# Patient Record
Sex: FEMALE | Race: WHITE | NOT HISPANIC OR LATINO | Employment: OTHER | URBAN - METROPOLITAN AREA
[De-identification: names, ages, dates, MRNs, and addresses within clinical notes are randomized per-mention and may not be internally consistent; named-entity substitution may affect disease eponyms.]

---

## 2017-01-12 ENCOUNTER — HOSPITAL ENCOUNTER (OUTPATIENT)
Dept: MRI IMAGING | Facility: HOSPITAL | Age: 67
Discharge: HOME | End: 2017-01-12
Attending: SURGERY
Payer: MEDICARE

## 2017-01-12 DIAGNOSIS — K86.3 CYST AND PSEUDOCYST OF PANCREAS: ICD-10-CM

## 2017-01-12 DIAGNOSIS — K86.2 CYST AND PSEUDOCYST OF PANCREAS: ICD-10-CM

## 2017-01-12 PROCEDURE — A9585 GADOBUTROL INJECTION: HCPCS | Performed by: SURGERY

## 2017-01-12 PROCEDURE — 74183 MRI ABD W/O CNTR FLWD CNTR: CPT

## 2017-01-12 PROCEDURE — 76377 3D RENDER W/INTRP POSTPROCES: CPT

## 2017-01-12 RX ADMIN — GADOBUTROL 10 ML: 604.72 INJECTION INTRAVENOUS at 11:34

## 2017-01-23 ENCOUNTER — ALLSCRIPTS OFFICE VISIT (OUTPATIENT)
Dept: OTHER | Facility: OTHER | Age: 67
End: 2017-01-23

## 2017-01-30 ENCOUNTER — GENERIC CONVERSION - ENCOUNTER (OUTPATIENT)
Dept: OTHER | Facility: OTHER | Age: 67
End: 2017-01-30

## 2017-02-13 ENCOUNTER — ALLSCRIPTS OFFICE VISIT (OUTPATIENT)
Dept: OTHER | Facility: OTHER | Age: 67
End: 2017-02-13

## 2017-02-13 ENCOUNTER — TRANSCRIBE ORDERS (OUTPATIENT)
Dept: ADMINISTRATIVE | Facility: HOSPITAL | Age: 67
End: 2017-02-13

## 2017-02-13 DIAGNOSIS — Z12.31 OTHER SCREENING MAMMOGRAM: Primary | ICD-10-CM

## 2017-02-17 ENCOUNTER — GENERIC CONVERSION - ENCOUNTER (OUTPATIENT)
Dept: OTHER | Facility: OTHER | Age: 67
End: 2017-02-17

## 2017-02-21 ENCOUNTER — GENERIC CONVERSION - ENCOUNTER (OUTPATIENT)
Dept: OTHER | Facility: OTHER | Age: 67
End: 2017-02-21

## 2017-03-01 ENCOUNTER — GENERIC CONVERSION - ENCOUNTER (OUTPATIENT)
Dept: OTHER | Facility: OTHER | Age: 67
End: 2017-03-01

## 2017-03-06 ENCOUNTER — HOSPITAL ENCOUNTER (OUTPATIENT)
Dept: MAMMOGRAPHY | Facility: HOSPITAL | Age: 67
Discharge: HOME | End: 2017-03-06
Attending: SURGERY
Payer: MEDICARE

## 2017-03-06 DIAGNOSIS — E78.5 HYPERLIPIDEMIA: ICD-10-CM

## 2017-03-06 DIAGNOSIS — Z12.31 OTHER SCREENING MAMMOGRAM: ICD-10-CM

## 2017-03-06 DIAGNOSIS — I10 ESSENTIAL (PRIMARY) HYPERTENSION: ICD-10-CM

## 2017-03-06 DIAGNOSIS — Z78.0 ASYMPTOMATIC MENOPAUSAL STATE: ICD-10-CM

## 2017-03-06 DIAGNOSIS — Z12.31 ENCOUNTER FOR SCREENING MAMMOGRAM FOR MALIGNANT NEOPLASM OF BREAST: ICD-10-CM

## 2017-03-06 PROCEDURE — G0202 SCR MAMMO BI INCL CAD: HCPCS

## 2017-03-08 ENCOUNTER — ALLSCRIPTS OFFICE VISIT (OUTPATIENT)
Dept: OTHER | Facility: OTHER | Age: 67
End: 2017-03-08

## 2017-03-10 ENCOUNTER — GENERIC CONVERSION - ENCOUNTER (OUTPATIENT)
Dept: OTHER | Facility: OTHER | Age: 67
End: 2017-03-10

## 2017-03-13 ENCOUNTER — ALLSCRIPTS OFFICE VISIT (OUTPATIENT)
Dept: OTHER | Facility: OTHER | Age: 67
End: 2017-03-13

## 2017-03-28 ENCOUNTER — GENERIC CONVERSION - ENCOUNTER (OUTPATIENT)
Dept: OTHER | Facility: OTHER | Age: 67
End: 2017-03-28

## 2017-06-19 ENCOUNTER — ALLSCRIPTS OFFICE VISIT (OUTPATIENT)
Dept: OTHER | Facility: OTHER | Age: 67
End: 2017-06-19

## 2017-08-10 ENCOUNTER — ALLSCRIPTS OFFICE VISIT (OUTPATIENT)
Dept: OTHER | Facility: OTHER | Age: 67
End: 2017-08-10

## 2017-08-22 ENCOUNTER — ALLSCRIPTS OFFICE VISIT (OUTPATIENT)
Dept: OTHER | Facility: OTHER | Age: 67
End: 2017-08-22

## 2017-10-09 ENCOUNTER — ALLSCRIPTS OFFICE VISIT (OUTPATIENT)
Dept: OTHER | Facility: OTHER | Age: 67
End: 2017-10-09

## 2017-10-09 DIAGNOSIS — I63.9 CEREBRAL INFARCTION (HCC): ICD-10-CM

## 2017-10-19 ENCOUNTER — HOSPITAL ENCOUNTER (OUTPATIENT)
Dept: NON INVASIVE DIAGNOSTICS | Facility: CLINIC | Age: 67
Discharge: HOME | End: 2017-10-19
Payer: MEDICARE

## 2017-10-19 ENCOUNTER — OFFICE VISIT (OUTPATIENT)
Dept: LAB | Facility: CLINIC | Age: 67
End: 2017-10-19
Payer: MEDICARE

## 2017-10-19 ENCOUNTER — TRANSCRIBE ORDERS (OUTPATIENT)
Dept: LAB | Facility: CLINIC | Age: 67
End: 2017-10-19

## 2017-10-19 DIAGNOSIS — I63.9 IMPENDING CEREBROVASCULAR ACCIDENT (HCC): ICD-10-CM

## 2017-10-19 DIAGNOSIS — I63.9 IMPENDING CEREBROVASCULAR ACCIDENT (HCC): Primary | ICD-10-CM

## 2017-10-19 DIAGNOSIS — I63.9 CEREBRAL INFARCTION (HCC): ICD-10-CM

## 2017-10-19 LAB
ATRIAL RATE: 79 BPM
P AXIS: 39 DEGREES
PR INTERVAL: 156 MS
QRS AXIS: 7 DEGREES
QRSD INTERVAL: 82 MS
QT INTERVAL: 430 MS
QTC INTERVAL: 493 MS
T WAVE AXIS: 30 DEGREES
VENTRICULAR RATE: 79 BPM

## 2017-10-19 PROCEDURE — 93306 TTE W/DOPPLER COMPLETE: CPT

## 2017-10-19 PROCEDURE — 93005 ELECTROCARDIOGRAM TRACING: CPT

## 2017-10-28 NOTE — PROGRESS NOTES
Assessment    1  Cerebral infarct (434 91) (I63 9)  2  Essential tremor (333 1) (G25 0)  3  Epileptic seizure (345 90) (G40 909)  4  Mild cognitive impairment (331 83) (G31 84)    Plan   Cerebral infarct    · ECG 12-LEAD; Status:Active; Requested JUF:[id number];   Perform:[facility]; GGZ:[id number]; Last Updated By:Angeli England; 10/9/2017 9:45:07 AM;Ordered; For:Cerebral infarct; Ordered By:Erika Bethea;   · ECHO COMPLETE WITH CONTRAST IF INDICATED; Status:Active; Requestedfor:09Oct2017;   Perform:[facility]; PII:10CHW1558; Last Updated By:Angeli England; 10/9/2017 9:45:07 AM;Ordered; For:Cerebral infarct; Ordered By:Erika Bethea;  Mild cognitive impairment    · Start: Donepezil HCl - 5 MG Oral Tablet; one in am for two weeks and then two afterthat  Rx By: Beti Hector; Dispense: 0 Days ; #:60 Tablet; Refill: 3;For: Mild cognitive impairment; HOMER = N; Verified Transmission to [facility]; Last Updated By: System, [employer]; 10/9/2017 9:37:33 AM  Follow-up visit in 6 months Evaluation and Treatment  Follow-up  Status: Hold For - Scheduling  Requested for: 74SLJ5661 Ordered; For: Mild cognitive impairment;  Ordered By: Beti Hector  Performed:   Due: 67HSR0439; Last Updated By: India Hagan; 10/9/2017 10:47:32 AM    Annotations             PT PUT ON EPIC WAIT LIST  Discussion/Summary  Discussion Summary:   CVA: will continue taking Plavix and aspirin 81mg  will get ECHO and EKG   disorder: continue Keppra 750mg twice a day  she was placed on Aricept 5mg in am for 2 weeks then two tabs after that  Chief Complaint  Chief Complaint Free Text Note Form: Pt is here for a neuro f/u; tremor, memory, seizures  History of Present Illness  HPI: We had the pleasure of evaluating Juwan Bell in neurological follow up today  As you know she is a 79year old right handed female  She originally lived in [place] and was a  there for 36 years   She is here today for follow up on her seizures and history of CVA in 2013  She currently lives in [place] [address] for the last one year and was in [place] [facility] before  She states she moved as the current place is cheaper  She has past medical history significant for seizure disorder  Apparently, her epilepsy was well controlled for about the last 30 years while she was not taking any anticonvulsants  Over a period of a few weeks in October and November 2013, she had begun experiencing seizures in [place] and began taking Keppra at that time  Over this time, she also noted increased confusion and difficulty navigating her surroundings claiming she was walking into things  She came to the [facility] to live with her brother and her confusion continued to worsen as well as symptoms of dizziness  Her brother and his family also noted behaviors of walking into walls and difficulty navigating the house and she was brought to [address] [place] [address] on November 16, 2013 for evaluation  Upon admission she had CT scan and MRI were done  The MRI showing multiple infarcts in the PCA territory, were affecting both the cerebellar and occipital lobes  She was diagnosed with Dewar Corcoran syndrome with cortical blindness, ataxia, encephalopathy, severe memory loss and chronic dizziness  She was continued on Keppra 500 mg b i d  She was found to have a high grade intracranial vertebral artery stenosis  Vascular did no believe she was a surgical intervention candidate and was put on Plavix and aspirin to take along with her Keppra and atorvastatin  last seizure was in 2013 and has not had one since then  She is on Keppra 750mg bid  No new concerns  Per family pt stableNo recurrent events since last seen  She is on Plavix 75mg and aspirin 81mg once a day  She has problem with depth perception and she is not able to see on the right  She is not able to contrast different colors  She is not able to process information that is not organized  tremor: No-No tremor in head, still present but unchanged  Has good and bad days  problems: Since last seen has remained the same  Started after the CVA  Per family her long term memory is stable but short term is a problem  reviewed      Review of Systems  Neurological ROS:  Constitutional: no fever, no chills, no recent weight gain, no recent weight loss, no complaints of feeling tired, no changes in appetite  HEENT:  no sinus problems, not feeling congested, no blurred vision, no dryness of the eyes, no eye pain, no hearing loss, no tinnitus, no mouth sores, no sore throat, no hoarseness, no dysphagia, no masses, no bleeding  Cardiovascular:  no chest pain or pressure, no palpitations present, the heart rate was not rapid or irregular, no swelling in the arms or legs, no poor circulation  Respiratory:  no unusual or persistant cough, no shortness of breath with or without exertion  Gastrointestinal:  no nausea, no vomiting, no diarrhea, no abdominal pain, no changes in bowel habits, no melena, no loss of bowel control  Genitourinary: increased frequency  Musculoskeletal:  no arthralgias, no myalgias, no immobility or loss of function, no head/neck/back pain, no pain while walking  Integumentary  no masses, no rash, no skin lesions, no livedo reticularis  Psychiatric:  no anxiety, no depression, no mood swings, no psychiatric hospitalizations, no sleep problems  Endocrine  no unusual weight loss or gain, no excessive urination, no excessive thirst, no hair loss or gain, no hot or cold intolerance, no menstrual period change or irregularity, no loss of sexual ability or drive, no erection difficulty, no nipple discharge  Hematologic/Lymphatic: a tendency for easy bruising  Neurological General: snoring  Neurological Mental Status: memory problems--   no confusion, no mood swings, no alteration or loss of consciousness, no difficulty expressing/understanding speech, no memory problems  Neurological Cranial Nerves: loss of vision  Neurological Motor findings include:  no tremor, no twitching, no cramping(pre/post exercise), no atrophy  Neurological Coordination:  no unsteadiness, no vertigo or dizziness, no clumsiness, no problems reaching for objects  Neurological Sensory:  no numbness, no pain, no tingling, does not fall when eyes closed or taking a shower  Neurological Gait:  no difficulty walking, not falling to one side, no sensation of being pushed, has not had falls  ROS Reviewed:   ROS reviewed  Active Problems    1  Abnormal colonoscopy (793 4) (R93 3)  2  Abnormal colonoscopy (793 4) (R93 3)  3  Abrasion, knee (916 0) (S80 219A)  4  Acute right-sided low back pain with left-sided sciatica (724 2,724 3) (M54 42)  5  Adenomatous colon polyp (211 3) (D12 6)  6  Basilar artery stenosis (433 00) (I65 1)  7  Blood pressure elevated (401 9) (I10)  8  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)  9  Cerebral infarct (434 91) (I63 9)  10  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)  11  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)  12  Dyslipidemia (272 4) (E78 5)  13  Encephalopathy (348 30) (G93 40)  14  Encounter for gynecological examination without abnormal finding (V72 31) (Z01 419)  15  Encounter for screening colonoscopy (V76 51) (Z12 11)  16  Encounter for screening mammogram for malignant neoplasm of breast (V76 12)  (Z12 31)  17  Epileptic seizure (345 90) (G40 909)  18  Essential tremor (333 1) (G25 0)  19  High risk for colon cancer (V49 89) (Z91 89)  20  High risk of ovarian cancer (V49 89) (Z91 89)  21  Impaired fasting glucose (790 21) (R73 01)  22  Left hip pain (719 45) (M25 552)  23  Migraine headache (346 90) (G43 909)  24  Need for prophylactic vaccination and inoculation against influenza (V04 81) (Z23)  25  Need for vaccination with 13-polyvalent pneumococcal conjugate vaccine (V03 82) (Z23)  26  Occipital Infarction (433 80)  27   Occlusion and stenosis of vertebral artery with cerebral infarction (433 21) (I63 219)  28  Osteoarthritis, hip, bilateral (715 95) (M16 0)  29  Pain of left sacroiliac joint (724 6) (M53 3)  30  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)  31  Piriformis syndrome, left (355 0) (G57 02)  32  Polyp of colon (211 3) (K63 5)  33  Post-menopausal (V49 81) (Z78 0)  34  Scanned copy of advance directives on file (V49 89) (Z78 9)  35  Short-term memory loss (780 93) (R41 3)    Past Medical History  1  History of Diverticulosis (562 10) (K57 90)  2  History of Hemorrhoids (455 6) (K64 9)    Surgical History    1  History of Complete Colonoscopy  2  History of Laparoscopic Appendectomy  3  Denied: History of Recent Surgery    Family History  Mother   1  Family history of Malignant neoplasm of left ovary  2  Family history of Malignant neoplasm of ovary, unspecified laterality  Father   3  Family history of cerebrovascular accident (V17 1) (Z82 3)  Paternal Grandfather   4  Family history of Lung Cancer (V16 1)  Other   5  Family history of MSH6-related Powers syndrome (HNPCC5)  Family History   6  Family history of Stroke Syndrome (V17 1)    Social History     · Denied: History of Alcohol Use (History)   · [facility]   · Never A Smoker    Current Meds  1  Aspirin 81 MG Oral Tablet Chewable; chew 1 tablet by mouth once daily; Therapy: 46RJK4575 to (Last Rx:79Lbo1705)  Requested for: 42Frb7632 Ordered  2  Atorvastatin Calcium 80 MG Oral Tablet; Take 1 tablet by mouth daily in the evening; Therapy: 18Ybp0672 to (Last Rx:19Rbg2557)  Requested for: 59Dig8962 Ordered  3  Clopidogrel Bisulfate 75 MG Oral Tablet; Take 1 tablet by mouth once daily; Therapy: 04JDI6478 to (Last Rx:04Vrm2859)  Requested for: 60Nge9587 Ordered  4  Cyclobenzaprine HCl - 5 MG Oral Tablet; Take 1 tablet by mouth at bedtime; Therapy: 30SSZ4048 to (Last Rx:53Wgf7687)  Requested for: 10Dyb5536 Ordered  5   LevETIRAcetam 750 MG Oral Tablet; TAKE 1 TABLET ORALLY TWICE DAILY (DO NOT CRUSH) (SEIZURES); Therapy: 86NAP2551 to (Evaluate:02Jun2017)  Requested for: 58JTU0971; Last Rx:04Nov2016; Status: ACTIVE - Renewal Denied Ordered    Allergies    1  No Known Drug Allergies    Vitals  Signs   Recorded: 66DQV3533 08:44AM   Heart Rate: 84  Systolic: 290  Diastolic: 86  Weight: 029 lb   BMI Calculated: 35 24  BSA Calculated: 2 13  Pain Scale: 0    Physical Exam   Constitutional  General appearance: Abnormal  -- Head tremor noted (no no)  Eyes  Ophthalmoscopic examination: Vision is grossly normal  Gross visual field testing by confrontation shows no abnormalities  EOMI in both eyes  Conjunctivae clear  Eyelids normal palpebral fissures equal  Orbits exhibit normal position  No discharge from the eyes  PERRL  Musculoskeletal  Gait and station: Abnormal   Gait evaluation demonstrated a wide-based and ataxic gait  Muscle strength: Normal strength throughout  Muscle tone: No atrophy, abnormal movements, flaccidity, cogwheeling or spasticity  Neurologic  Orientation to person, place, and time: Normal    Recent and remote memory: Abnormal  -- MoCA 7/6/2015 - 24/30 [place] 10/9/2017 - 20/30  Attention span and concentration: Normal thought process and attention span  Language: Names objects, able to repeat phrases and speaks spontaneously  Fund of knowledge: Normal vocabulary with appropriate knowledge of current events and past history  2nd cranial nerve: Abnormal  -- Right homonomous hemianopia    3rd, 4th, and 6th cranial nerves: Normal    5th cranial nerve: Normal    7th cranial nerve: Normal    8th cranial nerve: Normal    9th cranial nerve: Normal    11th cranial nerve: Normal    12th cranial nerve: Normal    Sensation: Normal    Reflexes: Normal    Coordination: Normal    Mood and affect: Normal        Future Appointments    Date/Time Provider Specialty Site   02/12/2018 10:00 AM Alba Hunter MD General Surgery [facility]       Signatures   Electronically signed by : Taylor Wang MD; Oct  9 2017 12:09PM EST                       (Author)    Electronically signed by : Teetee Guerrero MD; Oct  9 2017 12:10PM EST                       (Author)

## 2018-01-10 NOTE — MISCELLANEOUS
Message   Recorded as Task   Date: 01/18/2016 02:36 PM, Created By: Sridevi Barger   Task Name: Go to Result   Assigned To: [facility],Team   Regarding Patient: Rajendra Muñiz, Status: Active   CommentCeline Cruise - 18 Jan 2016 2:36 PM     TASK CREATED  Call with results  Normal followup of the pancreas  Soheila Meade - 19 Jan 2016 9:11 AM     TASK EDITED  Patient has an appointment on 1/21/16  Results will be addressed at that time  Active Problems    1  Abnormal colonoscopy (793 4) (R93 3)   2  Abnormal colonoscopy (793 4) (R93 3)   3  Abrasion, knee (916 0) (S80 219A)   4  Adenomatous colon polyp (211 3) (D12 6)   5  Basilar artery stenosis (433 00) (I65 1)   6  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)   7  CVA (cerebral vascular accident) (434 91) (I63 9)   8  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)   9  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)   10  Dyslipidemia (272 4) (E78 5)   11  Encephalopathy (348 30) (G93 40)   12  Encounter for screening colonoscopy (V76 51) (Z12 11)   13  Encounter for screening mammogram for malignant neoplasm of breast (V76 12)    (Z12 31)   14  Epileptic seizure (345 90) (G40 909)   15  Essential tremor (333 1) (G25 0)   16  Impaired fasting glucose (790 21) (R73 01)   17  Migraine headache (346 90) (G43 909)   18  Need for prophylactic vaccination and inoculation against influenza (V04 81) (Z23)   19  Need for vaccination with 13-polyvalent pneumococcal conjugate vaccine (V03 82) (Z23)   20  Occipital Infarction (433 80)   21  Occipital stroke (434 91) (I63 9)   22  Occlusion and stenosis of vertebral artery with cerebral infarction (433 21) (I63 9)   23  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)   24  Polyp of colon (211 3) (K63 5)   25  Seizure disorder (345 90) (G40 909)   26  Short-term memory loss (780 93) (R41 3)    Current Meds   1  Acetaminophen 325 MG Oral Tablet; 650mg P R N;   Therapy: (Recorded:30Jan2014) to Recorded   2   Aspirin 81 MG Oral Tablet Chewable; CHEW 1 TABLET AND SWALLOW ORALLY DAILY   (STROKE); Therapy: 41UBW8340 to (Prachiolm [place])  Requested for: 58BKP7268; Last   Rx:01Oct2015 Ordered   3  Aspirin 81 MG Oral Tablet; TAKE 1 TABLET DAILY; Therapy: 23Ijb7675 to ()  Requested for: 35VJR3357; Last   Rx:69Tjs6712 Ordered   4  Atorvastatin Calcium 80 MG Oral Tablet; TAKE 1 TABLET ORALLY DAILY WITH   EVENING MEAL (STROKE/DYSLIPIDEMIA); Therapy: 81Qji5944 to (Manjit Dicksonvalerie)  Requested for: 36UCK6562; Last   Rx:06Jan2016 Ordered   5  Clopidogrel Bisulfate 75 MG Oral Tablet; TAKE 1 TABLET DAILY; Therapy: 68OJN3310 to (Ameena Miller)  Requested for: 52Qjk4410; Last   Rx:91Arx9446; Status: ACTIVE - Renewal Denied Ordered   6  LevETIRAcetam 750 MG Oral Tablet (Keppra); TAKE 1 TABLET ORALLY TWICE DAILY   (DO NOT CRUSH) (SEIZURES); Therapy: 66SRN3088 to (Manjit Randolphvalerie)  Requested for: 86SOH0385; Last   Rx:06Jan2016 Ordered   7  Triple Antibiotic 5-400-5000 External Ointment; APPLY TOPICALLY TO ABRASIONS ON   KNEES TWICE DAILY AS NEEDED FOR INFECTION *RE-ORDER*;   Therapy: 52HJD3651 to (Evaluate:10Oct2014)  Requested for: 25UTX4768; Last   Rx:23Ygq7213 Ordered    Allergies    1   No Known Drug Allergies    Signatures   Electronically signed by : Marissa Haq, ; Jan 19 2016  3:32PM EST                       (Author)

## 2018-01-12 VITALS
BODY MASS INDEX: 35.24 KG/M2 | DIASTOLIC BLOOD PRESSURE: 86 MMHG | HEART RATE: 84 BPM | SYSTOLIC BLOOD PRESSURE: 189 MMHG | WEIGHT: 225 LBS

## 2018-01-13 VITALS
HEIGHT: 67 IN | DIASTOLIC BLOOD PRESSURE: 68 MMHG | HEART RATE: 60 BPM | TEMPERATURE: 98.3 F | SYSTOLIC BLOOD PRESSURE: 130 MMHG | BODY MASS INDEX: 35.34 KG/M2 | RESPIRATION RATE: 20 BRPM | WEIGHT: 225.13 LBS

## 2018-01-13 VITALS
HEIGHT: 67 IN | BODY MASS INDEX: 35.94 KG/M2 | HEART RATE: 86 BPM | DIASTOLIC BLOOD PRESSURE: 87 MMHG | WEIGHT: 229 LBS | SYSTOLIC BLOOD PRESSURE: 159 MMHG

## 2018-01-13 VITALS
HEIGHT: 67 IN | SYSTOLIC BLOOD PRESSURE: 140 MMHG | TEMPERATURE: 99.2 F | HEART RATE: 76 BPM | RESPIRATION RATE: 18 BRPM | WEIGHT: 227 LBS | DIASTOLIC BLOOD PRESSURE: 80 MMHG | BODY MASS INDEX: 35.63 KG/M2

## 2018-01-14 VITALS
WEIGHT: 226.5 LBS | BODY MASS INDEX: 35.55 KG/M2 | TEMPERATURE: 98.9 F | HEIGHT: 67 IN | HEART RATE: 80 BPM | SYSTOLIC BLOOD PRESSURE: 140 MMHG | RESPIRATION RATE: 18 BRPM | DIASTOLIC BLOOD PRESSURE: 90 MMHG

## 2018-01-14 NOTE — MISCELLANEOUS
Provider Comments  Provider Comments:   pt was a no show for appt today      Signatures   Electronically signed by : Jose Manuel Horne, ; Jun 19 2017  1:15PM EST                       (Author)

## 2018-01-16 NOTE — MISCELLANEOUS
Message   Date: 01 Sep 2016 11:15 AM EST, Recorded By: Dianah Blizzard For: [facility],Team   Isma De from [employer] and verified that they did speak with patient regarding the genetic testing and that the insurance would not cover the test  Ludivina Sr states "We spoke with her niece, Hank Campbell which is the POA, and explained that her insurance would not cover the testing and told her what the cost would be  At this time they do not want to go through with the genetic testing and agreed to inactivate it "    Genetic testing was canceled per request of the POA due to the cost and insurance not covering it  Active Problems    1  Abnormal colonoscopy (793 4) (R93 3)   2  Abnormal colonoscopy (793 4) (R93 3)   3  Abrasion, knee (916 0) (S80 219A)   4  Adenomatous colon polyp (211 3) (D12 6)   5  Basilar artery stenosis (433 00) (I65 1)   6  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)   7  Cerebral infarct (434 91) (I63 9)   8  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)   9  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)   10  Dyslipidemia (272 4) (E78 5)   11  Encephalopathy (348 30) (G93 40)   12  Encounter for screening colonoscopy (V76 51) (Z12 11)   13  Encounter for screening mammogram for malignant neoplasm of breast (V76 12)    (Z12 31)   14  Epileptic seizure (345 90) (G40 909)   15  Essential tremor (333 1) (G25 0)   16  High risk for colon cancer (V49 89) (Z91 89)   17  High risk of ovarian cancer (V49 89) (Z91 89)   18  Impaired fasting glucose (790 21) (R73 01)   19  Migraine headache (346 90) (G43 909)   20  Need for prophylactic vaccination and inoculation against influenza (V04 81) (Z23)   21  Need for vaccination with 13-polyvalent pneumococcal conjugate vaccine (V03 82) (Z23)   22  Occipital Infarction (433 80)   23  Occipital stroke (434 91) (I63 9)   24  Occlusion and stenosis of vertebral artery with cerebral infarction (433 21) (I63 219)   25  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)   26   Polyp of colon (211 3) (K63 5)   27  Seizure disorder (345 90) (G40 909)   28  Short-term memory loss (780 93) (R41 3)    Current Meds   1  Acetaminophen 325 MG Oral Tablet; TAKE 2 TABLET Every 4 hours PRN; Therapy: (Recorded:66Fiz6090) to Recorded   2  Aspirin 81 MG Oral Tablet Chewable; CHEW 1 TABLET AND SWALLOW ORALLY DAILY   (STROKE); Therapy: 65IIS3228 to 72 470 15 18)  Requested for: 28Mar2016; Last   Rx:28Mar2016 Ordered   3  Atorvastatin Calcium 80 MG Oral Tablet; TAKE 1 TABLET ORALLY DAILY WITH   EVENING MEAL (STROKE/DYSLIPIDEMIA); Therapy: 34Ysi1430 to (Evaluate:25Oct2016)  Requested for: 28Apr2016; Last   Rx:28Apr2016 Ordered   4  Clopidogrel Bisulfate 75 MG Oral Tablet; TAKE 1 TABLET ORALLY DAILY (STROKE); Therapy: 06Vge7148 to (Evaluate:22Jan2017)  Requested for: 69Pgo4559; Last   Rx:88Hjf7090 Ordered   5  LevETIRAcetam 750 MG Oral Tablet (Keppra); TAKE 1 TABLET ORALLY TWICE DAILY   (DO NOT CRUSH) (SEIZURES); Therapy: 33ABD7518 to (72 470 15 18)  Requested for: 28Apr2016; Last   Rx:28Apr2016 Ordered    Allergies    1   No Known Drug Allergies    Signatures   Electronically signed by : Kirsty Hager, ; Sep  1 2016 11:20AM EST                       (Author)

## 2018-01-16 NOTE — PROGRESS NOTES
Assessment    1  Encounter for gynecological examination without abnormal finding (V72 31) (Z01 419)    Discussion/Summary  health maintenance visit Currently, she eats an adequate diet and has an adequate exercise regimen  cervical cancer screening is current Breast cancer screening: mammogram is current  Colorectal cancer screening: colorectal cancer screening is current  Osteoporosis screening: bone mineral density testing has been ordered  Advice and education were given regarding nutrition, aerobic exercise, weight bearing exercise, weight loss, calcium supplements and vitamin D supplements  Pt is UTD with mammo and colonoscopy  Pt gets her colonscopy q3 years  Pt given referral for DEXA scan and may consider calcium/vit D supplementation  Encouraged continued aerobic exercise and healthy diet  Pt given salivary sample for genetic testing today  Will need to return to discuss results  Chief Complaint  Patient presents for yearly exam       History of Present Illness  HPI: Pt here for annual as a new patient  Pt accompanied by niece who was diagnosed with [facility] syndrome  Pt has never been sexualy active and had her first gyn exam last year  Pt had a pap smear and pelvic u/s and was told that results were WNL  Pt is currently in an assisted living facility  GYN HM, Adult [facility]: The patient is being seen for a gynecology evaluation  The last health maintenance visit was 1 year(s) ago  Social History: Household members include lives in a home  She is unmarried  General Health: The patient's health since the last visit is described as good  Lifestyle:  She consumes a diverse and healthy diet  She is on a diet and is generally adherent  Dietary details include 0 servings of dairy foods per day  She has weight concerns  Weight control issues: overweight and recent weight loss  She exercises regularly  She exercises PT through [facility]   She does not use tobacco  She denies alcohol use  She denies drug use  Reproductive health: the patient is postmenopausal   she reports no menstrual problems  she uses no contraception  she is not sexually active  she denies prior pregnancies  Screening: cancer screening reviewed and current  Cervical cancer screening includes a pap smear performed 2016  Breast cancer screening includes a mammogram performed 2017  Colorectal cancer screening includes a colonoscopy performed 2015  metabolic screening reviewed and updated  Metabolic screening includes no previous DEXA  Review of Systems    Constitutional: No fever, no chills, feels well, no tiredness, no recent weight gain or loss  ENT: no ear ache, no loss of hearing, no nosebleeds or nasal discharge, no sore throat or hoarseness  Cardiovascular: no complaints of slow or fast heart rate, no chest pain, no palpitations, no leg claudication or lower extremity edema  Respiratory: no complaints of shortness of breath, no wheezing, no dyspnea on exertion, no orthopnea or PND  Breasts: no complaints of breast pain, breast lump or nipple discharge  Gastrointestinal: no complaints of abdominal pain, no constipation, no nausea or diarrhea, no vomiting, no bloody stools  Genitourinary: no complaints of dysuria, no incontinence, no pelvic pain, no dysmenorrhea, no vaginal discharge or abnormal vaginal bleeding  Musculoskeletal: no complaints of arthralgia, no myalgia, no joint swelling or stiffness, no limb pain or swelling  Integumentary: no complaints of skin rash or lesion, no itching or dry skin, no skin wounds  Neurological: no complaints of headache, no confusion, no numbness or tingling, no dizziness or fainting  ROS reviewed  Active Problems    1  Abnormal colonoscopy (793 4) (R93 3)   2  Abnormal colonoscopy (793 4) (R93 3)   3  Abrasion, knee (916 0) (S80 219A)   4  Acute right-sided low back pain with left-sided sciatica (724 2,724 3) (M54 42)   5   Adenomatous colon polyp (211  3) (D12 6)   6  Basilar artery stenosis (433 00) (I65 1)   7  Blood pressure elevated (401 9) (I10)   8  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)   9  Cerebral infarct (434 91) (I63 9)   10  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)   11  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)   12  Dyslipidemia (272 4) (E78 5)   13  Encephalopathy (348 30) (G93 40)   14  Encounter for screening colonoscopy (V76 51) (Z12 11)   15  Encounter for screening mammogram for malignant neoplasm of breast (V76 12)    (Z12 31)   16  Epileptic seizure (345 90) (G40 909)   17  Essential tremor (333 1) (G25 0)   18  High risk for colon cancer (V49 89) (Z91 89)   19  High risk of ovarian cancer (V49 89) (Z91 89)   20  Impaired fasting glucose (790 21) (R73 01)   21  Left hip pain (719 45) (M25 552)   22  Migraine headache (346 90) (G43 909)   23  Need for prophylactic vaccination and inoculation against influenza (V04 81) (Z23)   24  Need for vaccination with 13-polyvalent pneumococcal conjugate vaccine (V03 82) (Z23)   25  Occipital Infarction (433 80)   26  Occipital stroke (434 91) (I63 9)   27  Occlusion and stenosis of vertebral artery with cerebral infarction (433 21) (I63 219)   28  Osteoarthritis, hip, bilateral (715 95) (M16 0)   29  Pain of left sacroiliac joint (724 6) (M53 3)   30  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)   31  Piriformis syndrome, left (355 0) (G57 02)   32  Polyp of colon (211 3) (K63 5)   33  Seizure disorder (345 90) (G40 909)   34  Short-term memory loss (780 93) (R41 3)    Past Medical History    · History of Diverticulosis (562 10) (K57 90)   · History of Hemorrhoids (455 6) (K64 9)    The active problems and past medical history were reviewed and updated today  Surgical History    · History of Complete Colonoscopy   · History of Laparoscopic Appendectomy   · Denied: History of Recent Surgery    The surgical history was reviewed and updated today         Family History  Mother    · Family history of Malignant neoplasm of left ovary   · Family history of Malignant neoplasm of ovary, unspecified laterality  Father    · Family history of cerebrovascular accident (V17 1) (Z80 1)  Paternal Grandfather    · Family history of Lung Cancer (V16 1)  Other    · Family history of MSH6-related Powers syndrome (HNPCC5)  Family History    · Family history of Stroke Syndrome (V17 1)    The family history was reviewed and updated today  Social History    · Denied: History of Alcohol Use (History)   · [facility]   · Never A Smoker  The social history was reviewed and updated today  The social history was reviewed and is unchanged  Current Meds   1  Acetaminophen 325 MG Oral Tablet; TAKE 2 TABLET Every 4 hours PRN; Therapy: (Recorded:79Jsc4767) to Recorded   2  Aspirin 81 MG Oral Tablet Chewable; CHEW 1 TABLET AND SWALLOW ORALLY DAILY   (STROKE); Therapy: 18MZC6455 to (Evaluate:27Apr2017)  Requested for: 11SDX4134; Last   Rx:28Nov2016 Ordered   3  Atorvastatin Calcium 80 MG Oral Tablet; TAKE 1 TABLET ORALLY DAILY WITH   EVENING MEAL (STROKE/DYSLIPIDEMIA); Therapy: 26Exb6358 to (Evaluate:02Jun2017)  Requested for: 42BRN3563; Last   Rx:04Nov2016 Ordered   4  Clopidogrel Bisulfate 75 MG Oral Tablet; TAKE 1 TABLET ORALLY DAILY (STROKE); Therapy: 94Fkt6421 to (Evaluate:22Jan2017)  Requested for: 48Ubj6480; Last   Rx:03Zvj3391 Ordered   5  Cyclobenzaprine HCl - 5 MG Oral Tablet; TAKE 1 TABLET AT BEDTIME; Therapy: 27NUL3513 to (Evaluate:38Wzi1224)  Requested for: 06Pqz9077; Last   Rx:02Dec2016 Ordered   6  LevETIRAcetam 750 MG Oral Tablet; TAKE 1 TABLET ORALLY TWICE DAILY (DO NOT   CRUSH) (SEIZURES); Therapy: 82FLZ2571 to (Evaluate:02Jun2017)  Requested for: 48ZLH4563; Last   Rx:04Nov2016 Ordered   7  Oxycodone-Acetaminophen 5-325 MG Oral Tablet Recorded   8  PredniSONE 20 MG Oral Tablet; TAKE 1 TABLET DAILY x5 days then 1/2 tab daily x2   days;    Therapy: 82YVH5489 to (Last Rx:04Roj6095)  Requested for: 71UAO1506 Ordered    Allergies    1  No Known Drug Allergies    Vitals   Recorded: 58SXP8219 61:81UF   Systolic 115   Diastolic 74   Height 5 ft 7 in   Weight 226 lb 3 04 oz   BMI Calculated 35 43   BSA Calculated 2 13     Physical Exam    Constitutional   General appearance: No acute distress, well appearing and well nourished  Neck   Neck: Normal, supple, trachea midline, no masses  Thyroid: Normal, no thyromegaly  Pulmonary   Respiratory effort: No increased work of breathing or signs of respiratory distress  Auscultation of lungs: Clear to auscultation  Cardiovascular   Auscultation of heart: Normal rate and rhythm, normal S1 and S2, no murmurs  Peripheral vascular exam: Normal pulses Throughout  Genitourinary   External genitalia: Normal and no lesions appreciated  Vagina: Normal, no lesions or dryness appreciated  Urethra: Normal     Urethral meatus: Normal     Bladder: Normal, soft, non-tender and no prolapse or masses appreciated  Cervix: Normal, no palpable masses  Uterus: Normal, non-tender, not enlarged, and no palpable masses  Adnexa/parametria: Normal, non-tender and no fullness or masses appreciated  Chest   Breasts: Normal and no dimpling or skin changes noted  Abdomen   Abdomen: Normal, non-tender, and no organomegaly noted  Liver and spleen: No hepatomegaly or splenomegaly  Examination for hernias: No hernias appreciated  Lymphatic   Palpation of lymph nodes in neck, axillae, groin and/or other locations: No lymphadenopathy or masses noted  Skin   Skin and subcutaneous tissue: Normal skin turgor and no rashes      Palpation of skin and subcutaneous tissue: Normal     Psychiatric   Orientation to person, place, and time: Normal     Mood and affect: Normal        Future Appointments    Date/Time Provider Specialty Site   03/13/2017 01:00 PM Phil Garcia [address] [place] [address]   07/20/2017 09:30 AM Constanza Mayorga MD Neurology [address] [place] [address]   02/12/2018 10:00 AM Miranda Fernando MD General Surgery [facility]     Signatures   Electronically signed by :  ZAC Stout ; Mar  8 2017  2:34PM EST                       (Author)

## 2018-01-16 NOTE — MISCELLANEOUS
Message   Recorded as Task   Date: 01/18/2016 02:36 PM, Created By: Ruddy Hernandez   Task Name: Go to Result   Assigned To: [facility],Team   Regarding Patient: Onalee Cockayne, Status: Active   Jose Alejandro Tang - 18 Jan 2016 2:36 PM     TASK CREATED  Yearly followup advised  Pre-please issue prescription  Soheila Meade - 19 Jan 2016 3:33 PM     TASK EDITED  Prescription for follow up study processed and will be scheduled and given to patient at her office visit on 1/21/16  Soheila Meade - 22 Jan 2016 3:57 PM     TASK EDITED  Addressed at office visit on 1/21/16  Active Problems    1  Abnormal colonoscopy (793 4) (R93 3)   2  Abnormal colonoscopy (793 4) (R93 3)   3  Abrasion, knee (916 0) (S80 219A)   4  Adenomatous colon polyp (211 3) (D12 6)   5  Basilar artery stenosis (433 00) (I65 1)   6  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)   7  CVA (cerebral vascular accident) (434 91) (I63 9)   8  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)   9  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)   10  Dyslipidemia (272 4) (E78 5)   11  Encephalopathy (348 30) (G93 40)   12  Encounter for screening colonoscopy (V76 51) (Z12 11)   13  Encounter for screening mammogram for malignant neoplasm of breast (V76 12)    (Z12 31)   14  Epileptic seizure (345 90) (G40 909)   15  Essential tremor (333 1) (G25 0)   16  Impaired fasting glucose (790 21) (R73 01)   17  Migraine headache (346 90) (G43 909)   18  Need for prophylactic vaccination and inoculation against influenza (V04 81) (Z23)   19  Need for vaccination with 13-polyvalent pneumococcal conjugate vaccine (V03 82) (Z23)   20  Occipital Infarction (433 80)   21  Occipital stroke (434 91) (I63 9)   22  Occlusion and stenosis of vertebral artery with cerebral infarction (433 21) (I63 9)   23  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)   24  Polyp of colon (211 3) (K63 5)   25  Seizure disorder (345 90) (G40 909)   26   Short-term memory loss (780 93) (R41 3)    Current Meds   1  Acetaminophen 325 MG Oral Tablet; 650mg P R N;   Therapy: (Recorded:30Jan2014) to Recorded   2  Aspirin 81 MG Oral Tablet Chewable; CHEW 1 TABLET AND SWALLOW ORALLY DAILY   (STROKE); Therapy: 21RCK1637 to (25 445173)  Requested for: 85KJA4621; Last   Rx:01Oct2015 Ordered   3  Atorvastatin Calcium 80 MG Oral Tablet; TAKE 1 TABLET ORALLY DAILY WITH   EVENING MEAL (STROKE/DYSLIPIDEMIA); Therapy: 03Eet5130 to (Claudetta Overlie)  Requested for: 94WOI9706; Last   Rx:06Jan2016 Ordered   4  Clopidogrel Bisulfate 75 MG Oral Tablet; TAKE 1 TABLET DAILY; Therapy: 92JQF3135 to (Sheyla Love)  Requested for: 15Xhn2190; Last   Rx:10Sep2015; Status: ACTIVE - Renewal Denied Ordered   5  LevETIRAcetam 750 MG Oral Tablet (Keppra); TAKE 1 TABLET ORALLY TWICE DAILY   (DO NOT CRUSH) (SEIZURES); Therapy: 63CBH3561 to (Claudetta Overlie)  Requested for: 28DGV8857; Last   Rx:06Jan2016 Ordered   6  Triple Antibiotic 5-400-5000 External Ointment; APPLY TOPICALLY TO ABRASIONS ON   KNEES TWICE DAILY AS NEEDED FOR INFECTION *RE-ORDER*;   Therapy: 26YHE6850 to (Evaluate:10Oct2014)  Requested for: 21KDZ9836; Last   Rx:66Wre2969 Ordered    Allergies    1   No Known Drug Allergies    Plan  Pancreatic cyst    · * MRI ABDOMEN WO CONTRAST AND MRCP; Status:Hold For - Exact Date,Scheduling;  Requested HQO:49MNM3303 10:30am [facility];     Signatures   Electronically signed by : Jack Navarro, ; Jan 22 2016  3:58PM EST                       (Author)

## 2018-01-17 NOTE — PROGRESS NOTES
Assessment    1  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)    Discussion/Summary  Discussion Summary:   77-year-old female who presents for followup on recent MRCP  Pancreatic cyst visualized and stable measuring 12 x 10 mm  Completely asymptomatic with no complaints  Stable pancreatic cyst  Followup with MRCP and MRI with pancreas protocol in one year  This will be due in January of 2017  Due for repeat screening colonoscopy in one to 2 years  All of this was explained to the patient and her family  Everyone is given the opportunity to questions  They are given a copy of the MRI radiology reading report  Chief Complaint  Chief Complaint Free Text Note Form: Patient is here today with her family to discuss the results of the MRCP done on 1/18/16 to follow up on an abnormal study done 2/27/15  fever/chills - denies   nausea/vomiting - denies  abd pain - denies   bowels - regular pattern, no blood or melena       History of Present Illness  HPI: 77-year-old female presents for followup regarding pancreatic cyst seen on prior imaging in February of 2015 at which point she had her appendix out  Recent MRI with MRCP on January 18, 2016 revealed a stable 12 x 10 mm pancreatic cyst and recommended yearly followup  Patient lives at an assisted living facility due to her mental capacity  She is accompanied to our office by her sister-in-law and brother  She denies any abdominal pain nausea vomiting diarrhea dark urine or light colored stools  Completely asymptomatic at this time  Review of Systems  Complete-Female:   Constitutional: no fever and no chills  Eyes: no eye pain  ENT: no earache  Cardiovascular: no chest pain and no palpitations  Respiratory: no shortness of breath  Gastrointestinal: no abdominal pain  Genitourinary: no dysuria  Musculoskeletal: no arthralgias  Integumentary: no itching and no skin wound  Neurological: no headache     Psychiatric: not suicidal    Endocrine: no proptosis  Hematologic/Lymphatic: no swollen glands  Active Problems    1  Abnormal colonoscopy (793 4) (R93 3)   2  Abnormal colonoscopy (793 4) (R93 3)   3  Abrasion, knee (916 0) (S80 219A)   4  Adenomatous colon polyp (211 3) (D12 6)   5  Basilar artery stenosis (433 00) (I65 1)   6  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)   7  CVA (cerebral vascular accident) (434 91) (I63 9)   8  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)   9  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)   10  Dyslipidemia (272 4) (E78 5)   11  Encephalopathy (348 30) (G93 40)   12  Encounter for screening colonoscopy (V76 51) (Z12 11)   13  Encounter for screening mammogram for malignant neoplasm of breast (V76 12)    (Z12 31)   14  Epileptic seizure (345 90) (G40 909)   15  Essential tremor (333 1) (G25 0)   16  Impaired fasting glucose (790 21) (R73 01)   17  Migraine headache (346 90) (G43 909)   18  Need for prophylactic vaccination and inoculation against influenza (V04 81) (Z23)   19  Need for vaccination with 13-polyvalent pneumococcal conjugate vaccine (V03 82) (Z23)   20  Occipital Infarction (433 80)   21  Occipital stroke (434 91) (I63 9)   22  Occlusion and stenosis of vertebral artery with cerebral infarction (433 21) (I63 9)   23  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)   24  Polyp of colon (211 3) (K63 5)   25  Seizure disorder (345 90) (G40 909)   26  Short-term memory loss (780 93) (R41 3)    Past Medical History    1  History of Diverticulosis (562 10) (K57 90)   2  History of Hemorrhoids (455 6) (K64 9)  Active Problems And Past Medical History Reviewed: The active problems and past medical history were reviewed and updated today  Surgical History    1  History of Complete Colonoscopy   2  History of Laparoscopic Appendectomy   3  Denied: History of Recent Surgery  Surgical History Reviewed: The surgical history was reviewed and updated today  Family History    1  Family history of cerebrovascular accident (V17 1) (Z82 3)    2  Family history of Lung Cancer (V16 1)    3  Family history of Stroke Syndrome (V17 1)  Family History Reviewed: The family history was reviewed and updated today  Social History    · Denied: History of Alcohol Use (History)   · [address]   · Never A Smoker  Social History Reviewed: The social history was reviewed and updated today  Current Meds   1  Acetaminophen 325 MG Oral Tablet; 650mg P R N;   Therapy: (Recorded:30Jan2014) to Recorded   2  Aspirin 81 MG Oral Tablet Chewable; CHEW 1 TABLET AND SWALLOW ORALLY DAILY   (STROKE); Therapy: 60RAE9589 to (67 488 45 07)  Requested for: 35SQY8102; Last   Rx:01Oct2015 Ordered   3  Atorvastatin Calcium 80 MG Oral Tablet; TAKE 1 TABLET ORALLY DAILY WITH EVENING   MEAL (STROKE/DYSLIPIDEMIA); Therapy: 61Pol9801 to (William Holman)  Requested for: 92BQM2648; Last   Rx:06Jan2016 Ordered   4  Clopidogrel Bisulfate 75 MG Oral Tablet; TAKE 1 TABLET DAILY; Therapy: 52KAC5427 to (Kenneth )  Requested for: 88Igo3098; Last   Rx:28Gui7542; Status: ACTIVE - Renewal Denied Ordered   5  LevETIRAcetam 750 MG Oral Tablet; TAKE 1 TABLET ORALLY TWICE DAILY (DO NOT   CRUSH) (SEIZURES); Therapy: 65AYH3268 to (William Holman)  Requested for: 41FRJ3047; Last   Rx:06Jan2016 Ordered   6  Triple Antibiotic 5-400-5000 External Ointment; APPLY TOPICALLY TO ABRASIONS ON   KNEES TWICE DAILY AS NEEDED FOR INFECTION *RE-ORDER*;   Therapy: 05MST5545 to (Evaluate:10Oct2014)  Requested for: 82KCE2978; Last   Rx:85Qri7469 Ordered  Medication List Reviewed: The medication list was reviewed and updated today  Allergies    1   No Known Drug Allergies    Vitals  Vital Signs [Data Includes: Current Encounter]    Recorded: 21Jan2016 01:50PM   Temperature 98 4 F, Oral   Heart Rate 92, L Radial   Pulse Quality Regular, L Radial   Respiration 18   Systolic 137, LUE, Sitting   Diastolic 90, LUE, Sitting   BP Cuff Size Large   Height 5 ft 7 in   Weight 236 lb 8 0 oz   BMI Calculated 37 04   BSA Calculated 2 17     Physical Exam    Constitutional   General appearance: No acute distress, well appearing and well nourished  Eyes   Conjunctiva and lids: No swelling, erythema or discharge  Pupils and irises: Equal, round and reactive to light  Sclera non-icteric  Ears, Nose, Mouth, and Throat   External inspection of ears and nose: Normal     Neck   Supple, symmetric, trachea midline, no masses   Pulmonary   Respiratory effort: No increased work of breathing or signs of respiratory distress  Auscultation of lungs: Clear to auscultation, equal breath sounds bilaterally, no wheezes, no rales, no rhonci  Cardiovascular   Auscultation of heart: Normal rate and rhythm, normal S1 and S2, without murmurs  Abdomen   Abdomen: Non-tender, no masses  Liver and spleen: No hepatomegaly or splenomegaly  No tenderness rebound guarding  Lymphatic   Palpation of lymph nodes in neck: No lymphadenopathy  Musculoskeletal   Gait and station: Normal     Skin   Skin and subcutaneous tissue: Normal without rashes or lesions  Neurologic   Cranial nerves: Cranial nerves 2-12 intact  Psychiatric   Orientation to person, place, and time: Normal     Mood and affect: Normal           Results/Data  Results   * MRI Abdomen W/O [id number] 09:13AM Rosario Levdelores     Test Name Result Flag Reference   MRI Abdomen W/O      see results that flowed into chart     Provider Comments  Provider Comments: This report has been generated by a voice recognition software system  Therefore, there may be syntax, spelling, and/or grammatical errors  Please call if you've any questions        Future Appointments    Date/Time Provider Specialty Site   [facility]2016 08[facility]   07/07/2016 09:45 AM Colin Arroyo [facility] Neurology [address]     Signatures   Electronically signed by : Ruthann العلي [facility]; Wenceslao Olivo 21 2016  2:19PM EST                       (Author)    Electronically signed by : Marina Ruano MD; Jan 22 2016 12:09PM EST

## 2018-01-17 NOTE — MISCELLANEOUS
Provider Comments  Provider Comments:   SPOKE TO NIECE (POWER OF ) AND HELPED R/S APPOINTMENT      Signatures   Electronically signed by : Lio Ruiz MD; Sep  1 2017  4:14PM EST                       (Co-participant)

## 2018-01-18 NOTE — MISCELLANEOUS
Message   Recorded as Task   Date: 01/13/2017 12:22 PM, Created By: Alba Hunter   Task Name: Go to Result   Assigned To: [facility],Team   Regarding Patient: Lola Rebolledo, Status: In Progress   Kristin Browne - 13 Jan 2017 12:22 PM     TASK CREATED  See patient in office for follow-up,   [facility],April - 13 Jan 2017 4:20 PM     TASK IN PROGRESS   Radha Andrew - 13 Jan 2017 4:22 PM     TASK EDITED  Patient contacted, spoke with Lizett Ulrich" who is now patients POA, paper work requested in order to release information, poa to email, pending reciept will f/u   Lala Moya - 16 Jan 2017 8:58 AM     TASK EDITED  POA paperwork received via e-mail  Okay to speak to Ashley brower  [facility],April - 23 Jan 2017 1:57 PM     TASK EDITED  call back attempted Friday 01/20/2017, no answer  message to contact office left on voicemail  [facility],April - 30 Jan 2017 8:59 AM     TASK EDITED  spoke with Lizett Ulrich on friday 01/27/2017 f/u apt scheduled per request    Follow up appointment with Dr Gisell Freeman is scheduled for 2/9/2017  1        1 Amended By: Rajan Shanks; Jan 30 2017 9:02 AM EST    Active Problems   1  Abnormal colonoscopy (793 4) (R93 3)  2  Abnormal colonoscopy (793 4) (R93 3)  3  Abrasion, knee (916 0) (S80 219A)  4  Acute right-sided low back pain with left-sided sciatica (724 2,724 3) (M54 42)  5  Adenomatous colon polyp (211 3) (D12 6)  6  Basilar artery stenosis (433 00) (I65 1)  7  Blood pressure elevated (401 9) (I10)  8  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)  9  Cerebral infarct (434 91) (I63 9)  10  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)  11  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)  12  Dyslipidemia (272 4) (E78 5)  13  Encephalopathy (348 30) (G93 40)  14  Encounter for screening colonoscopy (V76 51) (Z12 11)  15  Encounter for screening mammogram for malignant neoplasm of breast (V76 12)    (Z12 31)  16  Epileptic seizure (345 90) (G40 909)  17  Essential tremor (333 1) (G25 0)  18   High risk for colon cancer (V49 89) (Z91 89)  19  High risk of ovarian cancer (V49 89) (Z91 89)  20  Impaired fasting glucose (790 21) (R73 01)  21  Left hip pain (719 45) (M25 552)  22  Migraine headache (346 90) (G43 909)  23  Need for prophylactic vaccination and inoculation against influenza (V04 81) (Z23)  24  Need for vaccination with 13-polyvalent pneumococcal conjugate vaccine (V03 82) (Z23)  25  Occipital Infarction (433 80)  26  Occipital stroke (434 91) (I63 9)  27  Occlusion and stenosis of vertebral artery with cerebral infarction (433 21) (I63 219)  28  Osteoarthritis, hip, bilateral (715 95) (M16 0)  29  Pain of left sacroiliac joint (724 6) (M53 3)  30  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)  31  Piriformis syndrome, left (355 0) (G57 02)  32  Polyp of colon (211 3) (K63 5)  33  Seizure disorder (345 90) (G40 909)  34  Short-term memory loss (780 93) (R41 3)    Current Meds  1  Acetaminophen 325 MG Oral Tablet; TAKE 2 TABLET Every 4 hours PRN; Therapy: (Recorded:93Xzg5402) to Recorded  2  Aspirin 81 MG Oral Tablet Chewable; CHEW 1 TABLET AND SWALLOW ORALLY DAILY   (STROKE); Therapy: 94ONW4952 to (Evaluate:27Apr2017)  Requested for: 20MLN3593; Last   Rx:28Nov2016 Ordered  3  Atorvastatin Calcium 80 MG Oral Tablet; TAKE 1 TABLET ORALLY DAILY WITH   EVENING MEAL (STROKE/DYSLIPIDEMIA); Therapy: 20Hap4681 to (Evaluate:02Jun2017)  Requested for: 02PFS2163; Last   Rx:04Nov2016 Ordered  4  Clopidogrel Bisulfate 75 MG Oral Tablet; TAKE 1 TABLET ORALLY DAILY (STROKE); Therapy: 16Jlm5860 to (Evaluate:22Jan2017)  Requested for: 45Ops5423; Last   Rx:46Ycp7501 Ordered  5  Cyclobenzaprine HCl - 5 MG Oral Tablet; TAKE 1 TABLET AT BEDTIME; Therapy: 04UZO9732 to (Evaluate:37Tlb3619)  Requested for: 86Dlb3188; Last   Rx:70Tzc6156 Ordered  6  LevETIRAcetam 750 MG Oral Tablet (Keppra); TAKE 1 TABLET ORALLY TWICE DAILY   (DO NOT CRUSH) (SEIZURES);    Therapy: 83ACH4272 to (Evaluate:02Jun2017)  Requested for: 55XGN2842; Last   Rx:04Nov2016 Ordered  7  Oxycodone-Acetaminophen 5-325 MG Oral Tablet Recorded  8  PredniSONE 20 MG Oral Tablet; TAKE 1 TABLET DAILY x5 days then 1/2 tab daily x2   days; Therapy: 64Pkk1031 to (Last Rx:34Yyb9738)  Requested for: 78Uah9609 Ordered    Allergies   1   No Known Drug Allergies    Signatures   Electronically signed by : Karthik Hernandez LPN; Jan 30 7011  1:51YB EST                       (Author)    Electronically signed by : Abdi Sauer, ; Jan 30 2017  9:02AM EST                       (Author)

## 2018-01-22 VITALS
SYSTOLIC BLOOD PRESSURE: 138 MMHG | WEIGHT: 226.19 LBS | DIASTOLIC BLOOD PRESSURE: 74 MMHG | BODY MASS INDEX: 35.5 KG/M2 | HEIGHT: 67 IN

## 2018-02-08 RX ORDER — CLOPIDOGREL BISULFATE 75 MG/1
1 TABLET ORAL DAILY
Status: ON HOLD | COMMUNITY
Start: 2013-12-23 | End: 2018-03-06

## 2018-02-08 RX ORDER — ASPIRIN 81 MG/1
1 TABLET, CHEWABLE ORAL DAILY
COMMUNITY
Start: 2015-10-01 | End: 2019-09-12 | Stop reason: SDUPTHER

## 2018-02-08 RX ORDER — CYCLOBENZAPRINE HCL 5 MG
1 TABLET ORAL
COMMUNITY
Start: 2016-12-02 | End: 2020-11-16 | Stop reason: SDUPTHER

## 2018-02-08 RX ORDER — LEVETIRACETAM 750 MG/1
TABLET ORAL 2 TIMES DAILY
COMMUNITY
Start: 2014-01-30 | End: 2019-09-12 | Stop reason: SDUPTHER

## 2018-02-08 RX ORDER — ATORVASTATIN CALCIUM 80 MG/1
TABLET, FILM COATED ORAL
COMMUNITY
Start: 2015-09-17 | End: 2019-09-12 | Stop reason: SDUPTHER

## 2018-02-08 RX ORDER — DONEPEZIL HYDROCHLORIDE 5 MG/1
TABLET, FILM COATED ORAL
COMMUNITY
Start: 2017-10-09 | End: 2018-04-17 | Stop reason: ALTCHOICE

## 2018-02-12 ENCOUNTER — OFFICE VISIT (OUTPATIENT)
Dept: SURGERY | Facility: CLINIC | Age: 68
End: 2018-02-12
Payer: MEDICARE

## 2018-02-12 VITALS
WEIGHT: 225 LBS | TEMPERATURE: 98 F | DIASTOLIC BLOOD PRESSURE: 110 MMHG | SYSTOLIC BLOOD PRESSURE: 180 MMHG | HEART RATE: 96 BPM | HEIGHT: 68 IN | BODY MASS INDEX: 34.1 KG/M2 | RESPIRATION RATE: 16 BRPM

## 2018-02-12 DIAGNOSIS — Z12.39 SCREENING BREAST EXAMINATION: Primary | ICD-10-CM

## 2018-02-12 DIAGNOSIS — Z12.31 ENCOUNTER FOR SCREENING MAMMOGRAM FOR BREAST CANCER: ICD-10-CM

## 2018-02-12 DIAGNOSIS — K86.2 PANCREATIC CYST: ICD-10-CM

## 2018-02-12 DIAGNOSIS — Z12.11 ENCOUNTER FOR SCREENING COLONOSCOPY: ICD-10-CM

## 2018-02-12 PROCEDURE — 99215 OFFICE O/P EST HI 40 MIN: CPT | Performed by: SURGERY

## 2018-02-12 RX ORDER — DONEPEZIL HYDROCHLORIDE 10 MG/1
TABLET, FILM COATED ORAL
COMMUNITY
Start: 2018-01-22 | End: 2018-02-12 | Stop reason: SDUPTHER

## 2018-02-12 NOTE — LETTER
February 12, 2018     [facility], [address] [address]    Patient: Ashleigh Muñoz   YOB: 1950   Date of Visit: 2/12/2018       Dear Dr Bryant Viveros:    Thank you for referring Ashleigh Muñoz to me for evaluation  Below are my notes for this consultation  If you have questions, please do not hesitate to call me  I look forward to following your patient along with you  Sincerely,        Erma Oakes MD        CC: No Recipients  Madeline Orr  2/12/2018 10:55 AM  Sign at close encounter  Assessment/Plan:   Ashleigh Muñoz is a 79 y  o female who is here for The primary encounter diagnosis was Screening breast examination  A diagnosis of Encounter for screening mammogram for breast cancer was also pertinent to this visit  Patient presents to office for cancer screening  Strong family history of ovarian and colon cancer  Family history   Niece: early onset ovarian, endometrial cancer, DCIS  Father has colon cancer  Mother with early onset ovarian cancer    Screening for high risk breast cancer, exam benign today  On exam and upon review of recent breast imaging: birad 2    History abnormal appendix on colonoscopy in 2015  with subsequent  Appendectomy showing a large sessile serrated adenoma    Abnormal MRI with pancreatic cyst  No jaundice or abdominal complaints  Plan: Mammogram due now  Screening Colonoscopy to be scheduled  Genetic testing recommended , has been denied in past   MRI 2019 for pancreatic tail cyst          No results found  _______________________________________________________  HPI:  Ashleigh Muñoz is a 79 y  o female who was referred for evaluation of Cyst (Pancreatic cyst f/u)    Patient with strong cancer family history  Estee's niece LVH testing in 2015  Shows genetic predisposition of unknown variation and significance  Genetic counseling with no follow up   Niece younger than 48 with history ovarian cancer, endometrial cancer, positive for Powers Syndrome  She is BRCA negative  Estee's fathe with colon cancer  Marilou's mother with ovarian cancer  Cordelia Mars has history of pancreatic cyst with no obstructive or abdominal symptoms  The cyst is to be followed biannually with MRI  She will be scheduled in 2019  Last mammogram was January 2017  Last colonoscopy was February 2015 with serated adenoma of appendix  Appendectomy was subsequently removed  Currently: without complaints  Symptoms:   negative for breast lumps  Neg for change in bowel habits or rectal bleeding  Most recent mammogram: January 2017    Previous breast biopsy: none reported  Family history: niece and mother with ovarian cancer and DCIS, Father with colon cancer    ROS:  General ROS: negative  negative for - chills, fatigue, fever or night sweats, weight loss  Respiratory ROS: no cough, shortness of breath, or wheezing  Cardiovascular ROS: no chest pain or dyspnea on exertion  Genito-Urinary ROS: no dysuria, trouble voiding, or hematuria  Musculoskeletal ROS: negative for - gait disturbance, joint pain or muscle pain  Neurological ROS: no TIA or stroke symptoms  Breast ROS: see HPI  GI ROS: see HPI  Skin ROS: no new rashes or lesions   Lymphatic ROS: no new adenopathy noted by pt  GYN ROS: see HPI, no new GYN history or bleeding noted  Psy ROS: no new mental or behavioral disturbances       There is no problem list on file for this patient  Allergies: Patient has no known allergies      Meds:   Current Outpatient Prescriptions:     aspirin 81 mg chewable tablet, Chew 1 tablet daily, Disp: , Rfl:     atorvastatin (LIPITOR) 80 mg tablet, Take by mouth, Disp: , Rfl:     clopidogrel (PLAVIX) 75 mg tablet, Take 1 tablet by mouth daily, Disp: , Rfl:     cyclobenzaprine (FLEXERIL) 5 mg tablet, Take 1 tablet by mouth, Disp: , Rfl:     donepezil (ARICEPT) 5 mg tablet, Take by mouth, Disp: , Rfl:     levETIRAcetam (KEPPRA) 750 mg tablet, Take by mouth, Disp: , Rfl:     oxyCODONE-acetaminophen (PERCOCET) 5-325 mg per tablet, Take 1 tablet by mouth every 6 (six) hours as needed for moderate pain Max Daily Amount: 4 tablets, Disp: 12 tablet, Rfl: 0    PMH:   Past Medical History:   Diagnosis Date    Seizures ([address] )     Stroke [facility])        PSHx:   Past Surgical History:   Procedure Laterality Date    APPENDECTOMY         Family History:   Family History   Problem Relation Age of Onset    Stroke Father     Lung cancer Maternal Grandfather        Social History:  reports that she has never smoked  She has never used smokeless tobacco  She reports that she does not drink alcohol or use drugs            Imaging: no new      Lab Results   Component Value Date    WBC 8 26 11/17/2016    HGB 14 8 11/17/2016    HCT 45 4 11/17/2016    MCV 88 11/17/2016     11/17/2016     Lab Results   Component Value Date     11/17/2016    K 4 0 11/17/2016     11/17/2016    CO2 29 11/17/2016    ANIONGAP 8 11/17/2016    BUN 11 11/17/2016    CREATININE 0 81 11/17/2016    GLUCOSE 137 11/17/2016    CALCIUM 8 8 11/17/2016    AST 16 11/17/2016    ALT 28 11/17/2016    ALKPHOS 131 (H) 11/17/2016    PROT 7 5 11/17/2016    BILITOT 0 60 11/17/2016    EGFR >60 0 11/17/2016             PHYSICAL EXAM  General Appearance:    Alert, cooperative, no distress, NAD   Head:    Normocephalic without obvious abnormality   Eyes:    PERRL, conjunctiva/corneas clear, EOM's intact        Neck:   Supple, no adenopathy, no JVD   Back:     Symmetric, no spinal or CVA tenderness   Lungs:     Clear to auscultation bilaterally, no wheezing or rhonchi   Heart:  Breast:    Regular rate and rhythm, S1 and S2 normal, no murmur    normal appearance, no masses or tenderness, No nipple retraction or dimpling, No nipple discharge or bleeding, No axillary or supraclavicular adenopathy, Normal to palpation without dominant masses, breasts appear normal, no suspicious masses, no skin or nipple changes or axillary nodes    Abdomen:     Soft, nontender   Extremities:   Extremities normal  No clubbing, cyanosis or edema   Psych:   Normal Affect, AOx3  Neurologic:  Skin:   CNII-XII intact  Strength symmetric, speech intact    Warm, dry, intact, no visible rashes or lesions                       Signature:     Wilian Dennison MD    Date: 2/12/2018 Time: 10:15 AM                 Some portions of this record may have been generated with voice recognition software  There may be translation, syntax,  or grammatical errors  Occasional wrong word or "sound-a-like" substitutions may have occurred due to the inherent limitations of the voice recognition software  Read the chart carefully and recognize, using context, where substitutions may have occurred  If you have any questions, please contact the dictating provider for clarification or correction, as needed  This encounter has been coded by a non-certified coder

## 2018-02-12 NOTE — PROGRESS NOTES
Assessment/Plan:   Meghan Steven is a 79 y  o female who is here for The primary encounter diagnosis was Screening breast examination  A diagnosis of Encounter for screening mammogram for breast cancer was also pertinent to this visit  Patient presents to office for cancer screening  Strong family history of ovarian and colon cancer  Family history   Niece: early onset ovarian, endometrial cancer, DCIS  Father has colon cancer  Mother with early onset ovarian cancer    Screening for high risk breast cancer, exam benign today  On exam and upon review of recent breast imaging: birad 2    History abnormal appendix on colonoscopy in 2015  with subsequent  Appendectomy showing a large sessile serrated adenoma    Abnormal MRI with pancreatic cyst  No jaundice or abdominal complaints  Plan: Mammogram due now  Screening Colonoscopy to be scheduled  Genetic testing recommended , has been denied in past   MRI 2019 for pancreatic tail cyst          No results found  _______________________________________________________  HPI:  Meghan Steven is a 79 y  o female who was referred for evaluation of Cyst (Pancreatic cyst f/u)    Patient with strong cancer family history  Estee's niece LVH testing in 2015  Shows genetic predisposition of unknown variation and significance  Genetic counseling with no follow up  Niece younger than 48 with history ovarian cancer, endometrial cancer, positive for Powers Syndrome  She is BRCA negative  Estee's fathe with colon cancer  Marilou's mother with ovarian cancer  Adam Craig has history of pancreatic cyst with no obstructive or abdominal symptoms  The cyst is to be followed biannually with MRI  She will be scheduled in 2019  Last mammogram was January 2017  Last colonoscopy was February 2015 with serated adenoma of appendix  Appendectomy was subsequently removed  Currently: without complaints        Symptoms:   negative for breast lumps  Neg for change in bowel habits or rectal bleeding  Most recent mammogram: January 2017    Previous breast biopsy: none reported  Family history: niece and mother with ovarian cancer and DCIS, Father with colon cancer    ROS:  General ROS: negative  negative for - chills, fatigue, fever or night sweats, weight loss  Respiratory ROS: no cough, shortness of breath, or wheezing  Cardiovascular ROS: no chest pain or dyspnea on exertion  Genito-Urinary ROS: no dysuria, trouble voiding, or hematuria  Musculoskeletal ROS: negative for - gait disturbance, joint pain or muscle pain  Neurological ROS: no TIA or stroke symptoms  Breast ROS: see HPI  GI ROS: see HPI  Skin ROS: no new rashes or lesions   Lymphatic ROS: no new adenopathy noted by pt  GYN ROS: see HPI, no new GYN history or bleeding noted  Psy ROS: no new mental or behavioral disturbances       There is no problem list on file for this patient  Allergies: Patient has no known allergies      Meds:   Current Outpatient Prescriptions:     aspirin 81 mg chewable tablet, Chew 1 tablet daily, Disp: , Rfl:     atorvastatin (LIPITOR) 80 mg tablet, Take by mouth, Disp: , Rfl:     clopidogrel (PLAVIX) 75 mg tablet, Take 1 tablet by mouth daily, Disp: , Rfl:     cyclobenzaprine (FLEXERIL) 5 mg tablet, Take 1 tablet by mouth, Disp: , Rfl:     donepezil (ARICEPT) 5 mg tablet, Take by mouth, Disp: , Rfl:     levETIRAcetam (KEPPRA) 750 mg tablet, Take by mouth, Disp: , Rfl:     oxyCODONE-acetaminophen (PERCOCET) 5-325 mg per tablet, Take 1 tablet by mouth every 6 (six) hours as needed for moderate pain Max Daily Amount: 4 tablets, Disp: 12 tablet, Rfl: 0    PMH:   Past Medical History:   Diagnosis Date    [facility] [address]     Stroke [address] [address] )        PSHx:   Past Surgical History:   Procedure Laterality Date    APPENDECTOMY         Family History:   Family History   Problem Relation Age of Onset    Stroke Father     Lung cancer Maternal Grandfather        Social History:  reports that she has never smoked  She has never used smokeless tobacco  She reports that she does not drink alcohol or use drugs  Imaging: no new      Lab Results   Component Value Date    WBC 8 26 11/17/2016    HGB 14 8 11/17/2016    HCT 45 4 11/17/2016    MCV 88 11/17/2016     11/17/2016     Lab Results   Component Value Date     11/17/2016    K 4 0 11/17/2016     11/17/2016    CO2 29 11/17/2016    ANIONGAP 8 11/17/2016    BUN 11 11/17/2016    CREATININE 0 81 11/17/2016    GLUCOSE 137 11/17/2016    CALCIUM 8 8 11/17/2016    AST 16 11/17/2016    ALT 28 11/17/2016    ALKPHOS 131 (H) 11/17/2016    PROT 7 5 11/17/2016    BILITOT 0 60 11/17/2016    EGFR >60 0 11/17/2016             PHYSICAL EXAM  General Appearance:    Alert, cooperative, no distress, NAD   Head:    Normocephalic without obvious abnormality   Eyes:    PERRL, conjunctiva/corneas clear, EOM's intact        Neck:   Supple, no adenopathy, no JVD   Back:     Symmetric, no spinal or CVA tenderness   Lungs:     Clear to auscultation bilaterally, no wheezing or rhonchi   Heart:  Breast:    Regular rate and rhythm, S1 and S2 normal, no murmur    normal appearance, no masses or tenderness, No nipple retraction or dimpling, No nipple discharge or bleeding, No axillary or supraclavicular adenopathy, Normal to palpation without dominant masses, breasts appear normal, no suspicious masses, no skin or nipple changes or axillary nodes  Abdomen:     Soft, nontender   Extremities:   Extremities normal  No clubbing, cyanosis or edema   Psych:   Normal Affect, AOx3  Neurologic:  Skin:   CNII-XII intact  Strength symmetric, speech intact    Warm, dry, intact, no visible rashes or lesions                       Signature:     Guera Mcnally MD    Date: 2/12/2018 Time: 10:15 AM                 Some portions of this record may have been generated with voice recognition software  There may be translation, syntax,  or grammatical errors   Occasional wrong word or "sound-a-like" substitutions may have occurred due to the inherent limitations of the voice recognition software  Read the chart carefully and recognize, using context, where substitutions may have occurred  If you have any questions, please contact the dictating provider for clarification or correction, as needed  This encounter has been coded by a non-certified coder

## 2018-02-14 PROBLEM — Z80.0 FH: COLON CANCER: Status: ACTIVE | Noted: 2018-02-14

## 2018-02-15 ENCOUNTER — OFFICE VISIT (OUTPATIENT)
Dept: FAMILY MEDICINE CLINIC | Facility: CLINIC | Age: 68
End: 2018-02-15
Payer: MEDICARE

## 2018-02-15 VITALS
WEIGHT: 225.2 LBS | SYSTOLIC BLOOD PRESSURE: 152 MMHG | DIASTOLIC BLOOD PRESSURE: 90 MMHG | RESPIRATION RATE: 16 BRPM | HEART RATE: 78 BPM | BODY MASS INDEX: 35.35 KG/M2 | HEIGHT: 67 IN | TEMPERATURE: 98 F

## 2018-02-15 DIAGNOSIS — I10 ESSENTIAL HYPERTENSION: Primary | ICD-10-CM

## 2018-02-15 DIAGNOSIS — E78.5 HYPERLIPIDEMIA, UNSPECIFIED HYPERLIPIDEMIA TYPE: ICD-10-CM

## 2018-02-15 DIAGNOSIS — R56.9 SEIZURE (HCC): ICD-10-CM

## 2018-02-15 DIAGNOSIS — I63.9 CEREBROVASCULAR ACCIDENT (CVA), UNSPECIFIED MECHANISM (HCC): ICD-10-CM

## 2018-02-15 PROCEDURE — 99213 OFFICE O/P EST LOW 20 MIN: CPT | Performed by: FAMILY MEDICINE

## 2018-02-15 RX ORDER — HYDROCHLOROTHIAZIDE 25 MG/1
12.5 TABLET ORAL DAILY
Qty: 30 TABLET | Refills: 2 | Status: SHIPPED | OUTPATIENT
Start: 2018-02-15 | End: 2019-03-25 | Stop reason: ALTCHOICE

## 2018-02-15 NOTE — ASSESSMENT & PLAN NOTE
Hx CVA 2013  Stable, follows with neuro  Continue statin, asa, plavix  Assistance at [facility] for help with ADLs

## 2018-02-15 NOTE — PROGRESS NOTES
Nathaniel Ruiz 1950 female MRN: [id number]    Family Medicine Follow-up Visit    ASSESSMENT/PLAN  Problem List Items Addressed This Visit        Cardiovascular and Mediastinum    CVA (cerebral vascular accident) [facility])     Hx CVA 2013  Stable, follows with neuro  Continue statin, asa, plavix  Assistance at [facility] for help with ADLs         Essential hypertension - Primary     Newly diagnosed after multiple elevated bp readings here in our office and surgery office  Thankfully, no symptoms of HA, SOB, CP, or vision changes  Will order baseline lab work  Start HCTZ 12 5 mg daily  Recheck bp in office for follow up visit in 1-2 weeks          Relevant Medications    hydrochlorothiazide (HYDRODIURIL) 25 mg tablet    Other Relevant Orders    Comprehensive metabolic panel    Lipid panel    TSH, 3rd generation with T4 reflex    Protein / creatinine ratio, urine       Other    Seizure ([address] )     Stable an seizure free  Follows with neurology, Dr Letha Duverney on [facility] (hyperlipidemia)     Stable on atorvastatin  Check lipid panel with lab work                    Future Appointments  Date Time Provider Joslyn Poole   2/22/2018 4:00 PM Guillaume Bourne [facility]   4/11/2018 1:00 PM AN MAMMO 1  W Myrna Dan   4/17/2018 1:30 PM Romi Huynh MD NEURO [facility]          SUBJECTIVE  CC: Hypertension      HPI:  Nathaniel Ruiz is a 79 y o  female who presents for blood pressure evaluation  Was seen at Dr Gloria Juarez office this week and her blood pressure 180/110 at that time  Remote history of elevated blood pressure in the past but not on medication to treat blood pressure in the past      HPI    Review of Systems   Constitutional: Negative for chills and fatigue  Eyes: Negative for pain and visual disturbance  Respiratory: Negative for cough, chest tightness and shortness of breath  Cardiovascular: Negative for chest pain, palpitations and leg swelling  Gastrointestinal: Negative for abdominal distention, constipation, diarrhea and nausea  Musculoskeletal: Negative for arthralgias and back pain  Neurological: Negative for dizziness, seizures, weakness, light-headedness, numbness and headaches  Psychiatric/Behavioral: Negative for confusion and sleep disturbance  Historical Information   The patient history was reviewed as follows:    Past Medical History:   Diagnosis Date    Seizures ([address] )     Stroke [facility])      Past Surgical History:   Procedure Laterality Date    APPENDECTOMY       Family History   Problem Relation Age of Onset    Stroke Father     Lung cancer Maternal Grandfather       Social History   History   Alcohol Use No     History   Drug Use No     History   Smoking Status    Never Smoker   Smokeless Tobacco    Never Used       Medications:     Current Outpatient Prescriptions:     aspirin 81 mg chewable tablet, Chew 1 tablet daily, Disp: , Rfl:     atorvastatin (LIPITOR) 80 mg tablet, Take by mouth, Disp: , Rfl:     clopidogrel (PLAVIX) 75 mg tablet, Take 1 tablet by mouth daily, Disp: , Rfl:     cyclobenzaprine (FLEXERIL) 5 mg tablet, Take 1 tablet by mouth, Disp: , Rfl:     donepezil (ARICEPT) 5 mg tablet, Take by mouth, Disp: , Rfl:     levETIRAcetam (KEPPRA) 750 mg tablet, Take by mouth, Disp: , Rfl:     hydrochlorothiazide (HYDRODIURIL) 25 mg tablet, Take 0 5 tablets (12 5 mg total) by mouth daily, Disp: 30 tablet, Rfl: 2    oxyCODONE-acetaminophen (PERCOCET) 5-325 mg per tablet, Take 1 tablet by mouth every 6 (six) hours as needed for moderate pain Max Daily Amount: 4 tablets, Disp: 12 tablet, Rfl: 0  No Known Allergies    OBJECTIVE    Vitals:   Vitals:    02/15/18 1635   BP: 152/90   Pulse: 78   Resp: 16   Temp: 98 °F (36 7 °C)   Weight: 102 kg (225 lb 3 2 oz)   Height: 5' 6 7" (1 694 m)           Physical Exam   Constitutional: She is oriented to person, place, and time  She appears well-developed and well-nourished  HENT:   Head: Normocephalic and atraumatic  Mouth/Throat: Oropharynx is clear and moist    Eyes: Pupils are equal, round, and reactive to light  Neck: Normal range of motion  Cardiovascular: Normal rate, regular rhythm and normal heart sounds  No murmur heard  Pulmonary/Chest: Effort normal and breath sounds normal  No respiratory distress  She has no wheezes  Abdominal: Soft  Bowel sounds are normal  She exhibits no distension  There is no tenderness  Musculoskeletal: Normal range of motion  She exhibits no edema  Neurological: She is alert and oriented to person, place, and time  Skin: Skin is warm and dry  Psychiatric: She has a normal mood and affect   Her behavior is normal             Carmelita Hinds DO, PGY-3  [facility]   2/15/2018

## 2018-02-22 ENCOUNTER — OFFICE VISIT (OUTPATIENT)
Dept: FAMILY MEDICINE CLINIC | Facility: CLINIC | Age: 68
End: 2018-02-22
Payer: MEDICARE

## 2018-02-22 VITALS
HEART RATE: 74 BPM | SYSTOLIC BLOOD PRESSURE: 140 MMHG | TEMPERATURE: 98.9 F | HEIGHT: 66 IN | DIASTOLIC BLOOD PRESSURE: 80 MMHG | RESPIRATION RATE: 18 BRPM | BODY MASS INDEX: 36 KG/M2 | WEIGHT: 224 LBS

## 2018-02-22 DIAGNOSIS — I10 ESSENTIAL HYPERTENSION: Primary | ICD-10-CM

## 2018-02-22 PROCEDURE — 99213 OFFICE O/P EST LOW 20 MIN: CPT | Performed by: FAMILY MEDICINE

## 2018-02-22 NOTE — ASSESSMENT & PLAN NOTE
BP today 140/80  Well controlled after starting HCTZ 12 5 mg at our last visit  Patient tolerating medication well without any side effects  Lab work reviewed: TSH, lipid panel and CMP were all normal except slight elevation in alk phos, which we will monitor  Patient can proceed with planned colonoscopy

## 2018-02-22 NOTE — PROGRESS NOTES
Anthony Rashid 1950 female MRN: [id number]    Family Medicine Follow-up Visit    ASSESSMENT/PLAN  Problem List Items Addressed This Visit        Cardiovascular and Mediastinum    Essential hypertension - Primary     BP today 140/80  Well controlled after starting HCTZ 12 5 mg at our last visit  Patient tolerating medication well without any side effects  Lab work reviewed: TSH, lipid panel and CMP were all normal except slight elevation in alk phos, which we will monitor  Patient can proceed with planned colonoscopy  Follow up in 3 months or sooner if needed     Future Appointments  Date Time Provider Joslyn Poole   4/11/2018 1:00 PM AN MAMMO 1  W Myrna Dan   4/17/2018 1:30 PM Megan Clark MD NEURO [facility]   5/24/2018 4:00 PM DO TERRY Norton BE  Practice[facility]Com          SUBJECTIVE  CC: Hypertension      HPI:  Anthony Rashid is a 79 y o  female who presents for blood pressure follow up  She was seen here last week for concern of elevated  Blood pressure which was found at her colonoscopy pre op visit  She was started on HCTZ 12 5 mg at that time and advised to get blood work which she completed  She states she feels great and denies any CP, SOB, H/a, or dizziness  Tolerating medication well  No other complaints today     HPI    Review of Systems   Constitutional: Negative for chills, fatigue and fever  Eyes: Negative for photophobia and visual disturbance  Respiratory: Negative for cough and shortness of breath  Cardiovascular: Negative for chest pain and palpitations  Gastrointestinal: Negative for abdominal pain, constipation, diarrhea, nausea and vomiting  Genitourinary: Negative for dysuria, frequency and hematuria  Neurological: Negative for dizziness, syncope, light-headedness, numbness and headaches  Psychiatric/Behavioral: Negative for confusion  The patient is not nervous/anxious          Historical Information   The patient history was reviewed as follows:    Past Medical History:   Diagnosis Date    Diverticulosis     Seizures ([address] )     Stroke [facility])      Past Surgical History:   Procedure Laterality Date    APPENDECTOMY      COLONOSCOPY      complete     Family History   Problem Relation Age of Onset    Stroke Father     Lung cancer Maternal Grandfather     Ovarian cancer Mother      Left ovary     Lung cancer Paternal Grandfather     Other Other      MSH6-related Powers syndrome     Stroke Family       Social History   History   Alcohol Use No     History   Drug Use No     History   Smoking Status    Never Smoker   Smokeless Tobacco    Never Used       Medications:     Current Outpatient Prescriptions:     aspirin 81 mg chewable tablet, Chew 1 tablet daily, Disp: , Rfl:     atorvastatin (LIPITOR) 80 mg tablet, Take by mouth, Disp: , Rfl:     clopidogrel (PLAVIX) 75 mg tablet, Take 1 tablet by mouth daily, Disp: , Rfl:     cyclobenzaprine (FLEXERIL) 5 mg tablet, Take 1 tablet by mouth, Disp: , Rfl:     donepezil (ARICEPT) 5 mg tablet, Take by mouth, Disp: , Rfl:     hydrochlorothiazide (HYDRODIURIL) 25 mg tablet, Take 0 5 tablets (12 5 mg total) by mouth daily, Disp: 30 tablet, Rfl: 2    levETIRAcetam (KEPPRA) 750 mg tablet, Take by mouth, Disp: , Rfl:     oxyCODONE-acetaminophen (PERCOCET) 5-325 mg per tablet, Take 1 tablet by mouth every 6 (six) hours as needed for moderate pain Max Daily Amount: 4 tablets, Disp: 12 tablet, Rfl: 0  No Known Allergies    OBJECTIVE    Vitals:   Vitals:    02/22/18 1618   BP: 140/80   Pulse: 74   Resp: 18   Temp: 98 9 °F (37 2 °C)   Weight: 102 kg (224 lb)   Height: 5' 6" (1 676 m)           Physical Exam   Constitutional: She is oriented to person, place, and time  She appears well-developed and well-nourished  HENT:   Mouth/Throat: Oropharynx is clear and moist    Eyes: Pupils are equal, round, and reactive to light  Neck: Normal range of motion     Cardiovascular: Normal rate, regular rhythm and normal heart sounds  No murmur heard  Pulmonary/Chest: Effort normal and breath sounds normal  No respiratory distress  Abdominal: Soft  Bowel sounds are normal  She exhibits no distension  There is no tenderness  Musculoskeletal: Normal range of motion  She exhibits no edema  Neurological: She is alert and oriented to person, place, and time  Skin: Skin is warm and dry  Psychiatric: She has a normal mood and affect   Her behavior is normal             Darian Hein DO, PGY-3  [facility]   2/22/2018

## 2018-03-05 ENCOUNTER — ANESTHESIA EVENT (OUTPATIENT)
Dept: GASTROENTEROLOGY | Facility: HOSPITAL | Age: 68
End: 2018-03-05
Payer: MEDICARE

## 2018-03-06 ENCOUNTER — HOSPITAL ENCOUNTER (OUTPATIENT)
Facility: HOSPITAL | Age: 68
Setting detail: OUTPATIENT SURGERY
Discharge: NURSING, REHAB OR LONG-TERM CARE | End: 2018-03-06
Attending: SURGERY | Admitting: SURGERY
Payer: MEDICARE

## 2018-03-06 ENCOUNTER — ANESTHESIA (OUTPATIENT)
Dept: GASTROENTEROLOGY | Facility: HOSPITAL | Age: 68
End: 2018-03-06
Payer: MEDICARE

## 2018-03-06 VITALS
RESPIRATION RATE: 12 BRPM | DIASTOLIC BLOOD PRESSURE: 65 MMHG | SYSTOLIC BLOOD PRESSURE: 127 MMHG | OXYGEN SATURATION: 99 % | TEMPERATURE: 97.6 F | HEART RATE: 71 BPM

## 2018-03-06 DIAGNOSIS — Z80.0 FH: COLON CANCER: ICD-10-CM

## 2018-03-06 DIAGNOSIS — I63.9 CEREBROVASCULAR ACCIDENT (CVA), UNSPECIFIED MECHANISM (HCC): Primary | ICD-10-CM

## 2018-03-06 PROCEDURE — 88305 TISSUE EXAM BY PATHOLOGIST: CPT | Performed by: PATHOLOGY

## 2018-03-06 PROCEDURE — 88305 TISSUE EXAM BY PATHOLOGIST: CPT | Performed by: SURGERY

## 2018-03-06 PROCEDURE — 45384 COLONOSCOPY W/LESION REMOVAL: CPT | Performed by: SURGERY

## 2018-03-06 RX ORDER — CLOPIDOGREL BISULFATE 75 MG/1
75 TABLET ORAL DAILY
Qty: 1 TABLET | Refills: 0 | Status: SHIPPED | OUTPATIENT
Start: 2018-03-06 | End: 2019-09-12 | Stop reason: SDUPTHER

## 2018-03-06 RX ORDER — PROPOFOL 10 MG/ML
INJECTION, EMULSION INTRAVENOUS AS NEEDED
Status: DISCONTINUED | OUTPATIENT
Start: 2018-03-06 | End: 2018-03-06 | Stop reason: SURG

## 2018-03-06 RX ORDER — PROPOFOL 10 MG/ML
INJECTION, EMULSION INTRAVENOUS CONTINUOUS PRN
Status: DISCONTINUED | OUTPATIENT
Start: 2018-03-06 | End: 2018-03-06 | Stop reason: SURG

## 2018-03-06 RX ORDER — SODIUM CHLORIDE 9 MG/ML
125 INJECTION, SOLUTION INTRAVENOUS CONTINUOUS
Status: DISCONTINUED | OUTPATIENT
Start: 2018-03-06 | End: 2018-03-06 | Stop reason: HOSPADM

## 2018-03-06 RX ADMIN — SODIUM CHLORIDE 125 ML/HR: 0.9 INJECTION, SOLUTION INTRAVENOUS at 08:23

## 2018-03-06 RX ADMIN — PROPOFOL 100 MG: 10 INJECTION, EMULSION INTRAVENOUS at 09:24

## 2018-03-06 RX ADMIN — SODIUM CHLORIDE: 0.9 INJECTION, SOLUTION INTRAVENOUS at 09:25

## 2018-03-06 RX ADMIN — PROPOFOL 150 MCG/KG/MIN: 10 INJECTION, EMULSION INTRAVENOUS at 09:24

## 2018-03-06 NOTE — PROGRESS NOTES
D/C instructions given and pt verbalizes understanding  OOB to Br gait steady denies dizziness   Reports voided and passed air x 3

## 2018-03-06 NOTE — DISCHARGE INSTRUCTIONS
[facility]  Endoscopy Post-Operative Instructions  Dr Jag Hunt MD, FACS    Procedure: Colonoscopy    Findings:  Diverticulosis, Hemorrhoids and Polyp(s)     Follow-Up: You will need a repeat Endoscopy in (generally)5 years  Will await final pathology report for final determination of number of years until your follow up endoscopy, if you had polyps on this exam   Different types of polyps require different lengths of follow up surveillance  Please call our office or your primary doctor's office if you have any questions, once the report is returned  You should have an endoscopy sooner than recommended if you have any symptoms of bleeding or change in stools or other concerns  You will receive a call from our office with your results, in addition to the the preliminary results you received today  You will usually receive a follow-up letter from our office in 1-2 weeks  Call the office if you do not hear from us  You are welcome to also schedule an office visit if desired to discuss the results further  It is your responsibility to contact our office for results in 1- 2 weeks if you do not hear from us  If a follow up endoscopy is needed, you are responsible for arranging that follow up appointment at the appropriate time  The office may or may not issue a reminder at that future time  Please take responsibility for your own follow up healthcare  Diet: Eat a light snack first, and then resume your previous diet  Discharge Medications:  See after visit summary for reconciled discharge medications provided to patient and family        Current Facility-Administered Medications:     sodium chloride 0 9 % infusion, 125 mL/hr, Intravenous, Continuous, Sultana Garcia DO, Last Rate: 125 mL/hr at 03/06/18 9628    Facility-Administered Medications Ordered in Other Encounters:     propofol (DIPRIVAN) 1000 mg in 100 mL infusion (premix), , Intravenous, Continuous PRN, Con Miu, CRNA, Stopped at 03/06/18 1163    propofol (DIPRIVAN) 200 MG/20ML bolus injection, , Intravenous, PRN, Con Miu, CRNA, 100 mg at 03/06/18 6008  Do not take aspirin or blood thinning medications for 2 days following procedure  Tylenol is okay  You may have been given a new medication  Please take this (usually an anti-ulcer -type medication) and see your primary care doctor for refills and follow up medications if needed       After the test: Notify physician for arm swelling or pain at the intravenous site  You may have some abdominal discomfort following the procedure  Belching or passing flatus will help relieve it  Following upper endoscopy, you may experience a temporary sore throat  Saline gargles or lozenges can be taken to relieve sore throat Following lower endoscopy, minor rectal bleeding is not uncommon      Activity: Do not drive a car, operate machinery, or sign legal documents for 24 hours after your procedure  Normal activity may be resumed on the day following the procedure      Call the office at [phone] for any of the following: Severe abdominal pain, significant rectal bleeding, chills, or fever above 100°, new onset of persistent cough or persistent vomiting      [address], [address]   Phone: [phone]                    Colorectal Polyps   WHAT YOU NEED TO KNOW:   Colorectal polyps are small growths of tissue in the lining of the colon and rectum  Most polyps are hyperplastic polyps and are usually benign (noncancerous)  Certain types of polyps, called adenomatous polyps, may turn into cancer  DISCHARGE INSTRUCTIONS:   Follow up with your healthcare provider or gastroenterologist as directed: You may need to return for more tests, such as another colonoscopy  Write down your questions so you remember to ask them during your visits    Reduce your risk for colorectal polyps: · Eat a variety of healthy foods:  Healthy foods include fruit, vegetables, whole-grain breads, low-fat dairy products, beans, lean meat, and fish  Ask if you need to be on a special diet  · Maintain a healthy weight:  Ask your healthcare provider if you need to lose weight and how much you need to lose  Ask for help with a weight loss program     · Exercise:  Begin to exercise slowly and do more as you get stronger  Talk with your healthcare provider before you start an exercise program      · Limit alcohol:  Your risk for polyps increases the more you drink  · Do not smoke: If you smoke, it is never too late to quit  Ask for information about how to stop  For support and more information:   · Bob Luna ([facility])  [address], [place] [address]  Phone: [phone]  Web Address: www digestive  [facility]  Contact your healthcare provider or gastroenterologist if:   · You have a fever  · You have chills, a cough, or feel weak and achy  · You have abdominal pain that does not go away or gets worse after you take medicine  · Your abdomen is swollen  · You are losing weight without trying  · You have questions or concerns about your condition or care  Seek care immediately or call 911 if:   · You have sudden shortness of breath  · You have a fast heart rate, fast breathing, or are too dizzy to stand up  · You have severe abdominal pain  · You see blood in your bowel movement  © 2017 [address]  Information is for End User's use only and may not be sold, redistributed or otherwise used for commercial purposes  All illustrations and images included in CareNotes® are the copyrighted property of [employer]  or [address]  The above information is an  only  It is not intended as medical advice for individual conditions or treatments   Talk to your doctor, nurse or pharmacist before following any medical regimen to see if it is safe and effective for you  Diverticulosis   WHAT YOU NEED TO KNOW:   Diverticulosis is a condition that causes small pockets called diverticula to form in your intestine  These pockets make it difficult for bowel movements to pass through your digestive system  DISCHARGE INSTRUCTIONS:   Seek care immediately if:   · You have severe pain on the left side of your lower abdomen  · Your bowel movements are bright or dark red  Contact your healthcare provider if:   · You have a fever and chills  · You feel dizzy or lightheaded  · You have nausea, or you are vomiting  · You have a change in your bowel movements  · You have questions or concerns about your condition or care  Medicines:   · Medicines  to soften your bowel movements may be given  You may also need medicines to treat symptoms such as bloating and pain  · Take your medicine as directed  Contact your healthcare provider if you think your medicine is not helping or if you have side effects  Tell him or her if you are allergic to any medicine  Keep a list of the medicines, vitamins, and herbs you take  Include the amounts, and when and why you take them  Bring the list or the pill bottles to follow-up visits  Carry your medicine list with you in case of an emergency  Self-care: The goal of treatment is to manage any symptoms you have and prevent other problems such as diverticulitis  Diverticulitis is swelling or infection of the diverticula  Your healthcare provider may recommend any of the following:  · Eat a variety of high-fiber foods  High-fiber foods help you have regular bowel movements  High-fiber foods include cooked beans, fruits, vegetables, and some cereals  Most adults need 25 to 35 grams of fiber each day  Your healthcare provider may recommend that you have more  Ask your healthcare provider how much fiber you need  Increase fiber slowly   You may have abdominal discomfort, bloating, and gas if you add fiber to your diet too quickly  You may need to take a fiber supplement if you are not getting enough fiber from food  · Drink liquids as directed  You may need to drink 2 to 3 liters (8 to 12 cups) of liquids every day  Ask your healthcare provider how much liquid to drink each day and which liquids are best for you  · Apply heat  on your abdomen for 20 to 30 minutes every 2 hours for as many days as directed  Heat helps decrease pain and muscle spasms  Help prevent diverticulitis or other symptoms: The following may help decrease your risk for diverticulitis or symptoms, such as bleeding  Talk to your provider about these or other things you can do to prevent problems that may occur with diverticulosis  · Exercise regularly  Ask your healthcare provider about the best exercise plan for you  Exercise can help you have regular bowel movements  Get 30 minutes of exercise on most days of the week  · Maintain a healthy weight  Ask your healthcare provider how much you should weigh  Ask him or her to help you create a weight loss plan if you are overweight  · Do not smoke  Nicotine and other chemicals in cigarettes increase your risk for diverticulitis  Ask your healthcare provider for information if you currently smoke and need help to quit  E-cigarettes or smokeless tobacco still contain nicotine  Talk to your healthcare provider before you use these products  · Ask your healthcare provider if it is safe to take NSAIDs  NSAIDs may increase your risk of diverticulitis  Follow up with your healthcare provider as directed:  Write down your questions so you remember to ask them during your visits  © 2017 [address] Information is for End User's use only and may not be sold, redistributed or otherwise used for commercial purposes   All illustrations and images included in CareNotes® are the copyrighted property of [employer]  or [employer]  The above information is an  only  It is not intended as medical advice for individual conditions or treatments  Talk to your doctor, nurse or pharmacist before following any medical regimen to see if it is safe and effective for you  Hemorrhoids   WHAT YOU NEED TO KNOW:   Hemorrhoids are swollen blood vessels inside your rectum (internal hemorrhoids) or on your anus (external hemorrhoids)  Sometimes a hemorrhoid may prolapse  This means it extends out of your anus  DISCHARGE INSTRUCTIONS:   Seek care immediately if:   · You have severe pain in your rectum or around your anus  · You have severe pain in your abdomen and you are vomiting  · You have bleeding from your anus that soaks through your underwear  Contact your healthcare provider if:   · You have frequent and painful bowel movements  · Your hemorrhoid looks or feels more swollen than usual      · You do not have a bowel movement for 2 days or more  · You see or feel tissue coming through your anus  · You have questions or concerns about your condition or care  Medicines: You may  need any of the following:  · Medicine  may be given to decrease pain, swelling, and itching  The medicine may come as a pad, cream, or ointment  · Stool softeners  help treat or prevent constipation  · NSAIDs , such as ibuprofen, help decrease swelling, pain, and fever  NSAIDs can cause stomach bleeding or kidney problems in certain people  If you take blood thinner medicine, always ask your healthcare provider if NSAIDs are safe for you  Always read the medicine label and follow directions  · Take your medicine as directed  Contact your healthcare provider if you think your medicine is not helping or if you have side effects  Tell him or her if you are allergic to any medicine  Keep a list of the medicines, vitamins, and herbs you take   Include the amounts, and when and why you take them  Bring the list or the pill bottles to follow-up visits  Carry your medicine list with you in case of an emergency  Manage your symptoms:   · Apply ice on your anus for 15 to 20 minutes every hour or as directed  Use an ice pack, or put crushed ice in a plastic bag  Cover it with a towel before you apply it to your anus  Ice helps prevent tissue damage and decreases swelling and pain  · Take a sitz bath  Fill a bathtub with 4 to 6 inches of warm water  You may also use a sitz bath pan that fits inside a toilet bowl  Sit in the sitz bath for 15 minutes  Do this 3 times a day, and after each bowel movement  The warm water can help decrease pain and swelling  · Keep your anal area clean  Gently wash the area with warm water daily  Soap may irritate the area  After a bowel movement, wipe with moist towelettes or wet toilet paper  Dry toilet paper can irritate the area  Prevent hemorrhoids:   · Do not strain to have a bowel movement  Do not sit on the toilet too long  These actions can increase pressure on the tissues in your rectum and anus  · Drink plenty of liquids  Liquids can help prevent constipation  Ask how much liquid to drink each day and which liquids are best for you  · Eat a variety of high-fiber foods  Examples include fruits, vegetables, and whole grains  Ask your healthcare provider how much fiber you need each day  You may need to take a fiber supplement  · Exercise as directed  Exercise, such as walking, may make it easier to have a bowel movement  Ask your healthcare provider to help you create an exercise plan  · Do not have anal sex  Anal sex can weaken the skin around your rectum and anus  · Avoid heavy lifting  This can cause straining and increase your risk for another hemorrhoid  Follow up with your healthcare provider as directed:  Write down your questions so you remember to ask them during your visits     © 2017 [address] [facility] Information is for End User's use only and may not be sold, redistributed or otherwise used for commercial purposes  All illustrations and images included in CareNotes® are the copyrighted property of A D A M , Inc  or [employer]  The above information is an  only  It is not intended as medical advice for individual conditions or treatments  Talk to your doctor, nurse or pharmacist before following any medical regimen to see if it is safe and effective for you  Colonoscopy   WHAT YOU NEED TO KNOW:   A colonoscopy is a procedure to examine the inside of your colon (intestine) with a scope  Polyps or tissue growths may have been removed during your colonoscopy  It is normal to feel bloated and to have some abdominal discomfort  You should be passing gas  If you have hemorrhoids or you had polyps removed, you may have a small amount of bleeding  DISCHARGE INSTRUCTIONS:   Seek care immediately if:   · You have a large amount of bright red blood in your bowel movements  · Your abdomen is hard and firm and you have severe pain  · You have sudden trouble breathing  Contact your healthcare provider if:   · You develop a rash or hives  · You have a fever within 24 hours of your procedure       · You have not had a bowel movement for 3 days after your procedure  · You have questions or concerns about your condition or care  Activity:   · Do not lift, strain, or run  for 3 days after your procedure  · Rest after your procedure  You have been given medicine to relax you  Do not  drive or make important decisions until the day after your procedure  Return to your normal activity as directed  · Relieve gas and discomfort from bloating  by lying on your right side with a heating pad on your abdomen  You may need to take short walks to help the gas move out  Eat small meals until bloating is relieved  If you had polyps removed:   For 7 days after your procedure:  · Do not take aspirin  · Do not  go on long car rides  Follow up with your healthcare provider as directed:  Write down your questions so you remember to ask them during your visits  © 2017 [address] is for End User's use only and may not be sold, redistributed or otherwise used for commercial purposes  All illustrations and images included in CareNotes® are the copyrighted property of A D A M , Inc  or [employer]  The above information is an  only  It is not intended as medical advice for individual conditions or treatments  Talk to your doctor, nurse or pharmacist before following any medical regimen to see if it is safe and effective for you

## 2018-03-06 NOTE — ANESTHESIA PREPROCEDURE EVALUATION
Review of Systems/Medical History          Cardiovascular  Hyperlipidemia, Hypertension ,    Pulmonary  Negative pulmonary ROS        GI/Hepatic  Negative GI/hepatic ROS          Negative  ROS        Endo/Other  Negative endo/other ROS      GYN  Negative gynecology ROS          Hematology  Negative hematology ROS      Musculoskeletal  Negative musculoskeletal ROS        Neurology  Seizures well controlled,  CVA , residual symptoms,   Comment: Last seizure and CVA 2013  Residual visual deficit right side  Psychology   Negative psychology ROS              Physical Exam    Airway    Mallampati score: II  TM Distance: >3 FB  Neck ROM: full     Dental   No notable dental hx     Cardiovascular  Rhythm: regular, Rate: normal, Cardiovascular exam normal    Pulmonary  Pulmonary exam normal Breath sounds clear to auscultation,     Other Findings        Anesthesia Plan  ASA Score- 3     Anesthesia Type- IV sedation with anesthesia with ASA Monitors  Additional Monitors:   Airway Plan:         Plan Factors-    Induction-     Postoperative Plan-     Informed Consent- Anesthetic plan and risks discussed with patient

## 2018-03-06 NOTE — DISCHARGE SUMMARY
Discharge Summary - Meghan Steven 79 y o  female MRN: [id number]    Unit/Bed#: [facility]ILDA [facility]TA[facility]O Encounter: [id number]      Pre-Operative Diagnosis: Pre-Op Diagnosis Codes:     * FH: colon cancer [Z80 0]    Post-Operative Diagnosis: Post-Op Diagnosis Codes:     * FH: colon cancer [Z80 0]     * Adenomatous polyps [D36 9]    Procedures Performed:  Procedure(s):  COLONOSCOPY    Surgeon: Ivana Baird MD    See H & P for full details of admission and Operative Note for full details of operations performed  Patient was seen and examined prior to discharge  Provisions for Follow-Up Care:  See After Visit Summary for information related to follow-up care and home orders  Disposition: Home, in stable condition  Planned Readmission: No    Discharge Medications:  See after visit summary for reconciled discharge medications provided to patient and family  Post Operative instructions: Reviewed with patient and/or family  Some portions of this record may have been generated with voice recognition software  There may be translation, syntax,  or grammatical errors  Occasional wrong word or "sound-a-like" substitutions may have occurred due to the inherent limitations of the voice recognition software  Read the chart carefully and recognize, using context, where substitutions may have occurred  If you have any questions, please contact the dictating provider for clarification or correction, as needed  This encounter has been coded by non certified Coder      Signature:   Ivana Baird MD  Date: 3/6/2018 Time: 9:40 AM

## 2018-03-06 NOTE — OP NOTE
COLONOSCOPY  Postoperative Note  PATIENT NAME: Ishmael Dancer  : 1950  MRN: [id number]  [facility] 01    Surgery Date: 3/6/2018      Pre operative diagnosis:   FH: colon cancer [Z80 0]    Operative Indications:  Due for Colonoscopy      Consent:  The risks, benefits, and alternatives to the surgery were discussed with the patient and with the family prior to surgery if available, personally by Dr Jeana William  If the consent was obtained by the physician assistant or other representative, the consent was reviewed once again personally by the operating physician  Common complications particular for this procedure as well as unusual complications were discussed, including but not limited to:  bleeding, wound infection, prolonged wound healing, open wounds, reoperation, leak from the bowel or viscus, leak from the bile duct or injury to adjacent or other organs or blood vessels in the abdomen, and possible surgery or reoperation  A  was used if necessary  The patient expressed understanding of the issues discussed and wished and consented to the procedure to proceed  All questions were answered  Dr Jeana William personally discussed the informed consent with this patient  Post-Operative Diagnosis and Findings:     Diverticulosis and Hemorrhoids     Multiple polyp(s) seen in Right Colon and Sigmoid Colon      Procedure:    Procedure(s):  COLONOSCOPY with Hot forcep polypectomy      Surgeon(s) and Role:     * Jaylin Hernandez MD - Primary    I was present for the entire procedure  Specimens:  ID Type Source Tests Collected by Time Destination   1 : 3mm polyp ascending colon polyp Tissue Colon TISSUE EXAM Jaylin Hernandez MD 3/6/2018 9649    2 : 3mm polyp at 15cm Tissue Colon TISSUE EXAM Jaylin Hernandez MD 3/6/2018 0436        Estimated Blood Loss:   Minimal    Anesthesia Type:   Choice     Procedure: The patient was seen in the Holding Room   The risks, benefits, complications, treatment options, and expected outcomes were discussed with the patient  The possibilities of reaction to medication, pulmonary aspiration, perforation of viscus, bleeding, recurrent infection, the need for additional procedures, failure to diagnose a condition, missed polyps, a complication requiring transfusion or operation were discussed with the patient  The patient concurred with the proposed plan, giving informed consent  The patient was taken to Endoscopy Suite, identified as [facility]  Staff confirmed patient name, , and procedure  A Time Out was held and the above information confirmed  A visual and digital exam was carried out of the anus and anal canal   Findings were normal unless specified below  The colonoscope was passed per anus and traversed to the cecum  The cecum was clearly visualized in the right lower quadrant by light reflex and palpation  The scope was withdrawn  There were mucosal lesion(s) and/or polyp(s) seen in the colon  These polyps were located at: ascending colon and sigmoid colon  The polyp(s) were addressed using polypectomy technique described above  There were diverticula scattered throughout the sigmoid colon and grade 1 and 2 hemorrhoids  A photograph was taken  The scope was retroflexed  There were no anorectal lesions other than the hemorrhoids  The scope was removed  The patient tolerated the procedure well  Withdrawal time was greater than 8 minutes  Prep was good  at a 4/5  Some portions of this record may have been generated with voice recognition software  There may be translation, syntax,  or grammatical errors  Occasional wrong word or "sound-a-like" substitutions may have occurred due to the inherent limitations of the voice recognition software  Read the chart carefully and recognize, using context, where substations may have occurred   If you have any questions, please contact the dictating provider for clarification or correction, as needed         Complications: None    Condition: Stable to PACU    SIGNATURE: Desiree Wiseman MD   DATE: March 6, 2018   TIME: 9:39 AM

## 2018-03-06 NOTE — H&P (VIEW-ONLY)
Assessment/Plan:   Ashleigh Muñoz is a 79 y  o female who is here for The primary encounter diagnosis was Screening breast examination  A diagnosis of Encounter for screening mammogram for breast cancer was also pertinent to this visit  Patient presents to office for cancer screening  Strong family history of ovarian and colon cancer  Family history   Niece: early onset ovarian, endometrial cancer, DCIS  Father has colon cancer  Mother with early onset ovarian cancer    Screening for high risk breast cancer, exam benign today  On exam and upon review of recent breast imaging: birad 2    History abnormal appendix on colonoscopy in 2015  with subsequent  Appendectomy showing a large sessile serrated adenoma    Abnormal MRI with pancreatic cyst  No jaundice or abdominal complaints  Plan: Mammogram due now  Screening Colonoscopy to be scheduled  Genetic testing recommended , has been denied in past   MRI 2019 for pancreatic tail cyst          No results found  _______________________________________________________  HPI:  Ashleigh Muñoz is a 79 y  o female who was referred for evaluation of Cyst (Pancreatic cyst f/u)    Patient with strong cancer family history  Estee's niece LVH testing in 2015  Shows genetic predisposition of unknown variation and significance  Genetic counseling with no follow up  Niece younger than 48 with history ovarian cancer, endometrial cancer, positive for Powers Syndrome  She is BRCA negative  Estee's fathe with colon cancer  Marilou's mother with ovarian cancer  [facility] has history of pancreatic cyst with no obstructive or abdominal symptoms  The cyst is to be followed biannually with MRI  She will be scheduled in 2019  Last mammogram was January 2017  Last colonoscopy was February 2015 with serated adenoma of appendix  Appendectomy was subsequently removed  Currently: without complaints        Symptoms:   negative for breast lumps  Neg for change in bowel habits or rectal bleeding  Most recent mammogram: January 2017    Previous breast biopsy: none reported  Family history: niece and mother with ovarian cancer and DCIS, Father with colon cancer    ROS:  General ROS: negative  negative for - chills, fatigue, fever or night sweats, weight loss  Respiratory ROS: no cough, shortness of breath, or wheezing  Cardiovascular ROS: no chest pain or dyspnea on exertion  Genito-Urinary ROS: no dysuria, trouble voiding, or hematuria  Musculoskeletal ROS: negative for - gait disturbance, joint pain or muscle pain  Neurological ROS: no TIA or stroke symptoms  Breast ROS: see HPI  GI ROS: see HPI  Skin ROS: no new rashes or lesions   Lymphatic ROS: no new adenopathy noted by pt  GYN ROS: see HPI, no new GYN history or bleeding noted  Psy ROS: no new mental or behavioral disturbances       There is no problem list on file for this patient  Allergies: Patient has no known allergies      Meds:   Current Outpatient Prescriptions:     aspirin 81 mg chewable tablet, Chew 1 tablet daily, Disp: , Rfl:     atorvastatin (LIPITOR) 80 mg tablet, Take by mouth, Disp: , Rfl:     clopidogrel (PLAVIX) 75 mg tablet, Take 1 tablet by mouth daily, Disp: , Rfl:     cyclobenzaprine (FLEXERIL) 5 mg tablet, Take 1 tablet by mouth, Disp: , Rfl:     donepezil (ARICEPT) 5 mg tablet, Take by mouth, Disp: , Rfl:     levETIRAcetam (KEPPRA) 750 mg tablet, Take by mouth, Disp: , Rfl:     oxyCODONE-acetaminophen (PERCOCET) 5-325 mg per tablet, Take 1 tablet by mouth every 6 (six) hours as needed for moderate pain Max Daily Amount: 4 tablets, Disp: 12 tablet, Rfl: 0    PMH:   Past Medical History:   Diagnosis Date    Seizures ([facility] [address] )     Stroke ([facility] [address] )        PSHx:   Past Surgical History:   Procedure Laterality Date    APPENDECTOMY         Family History:   Family History   Problem Relation Age of Onset    Stroke Father     Lung cancer Maternal Grandfather        Social History:  reports that she has never smoked  She has never used smokeless tobacco  She reports that she does not drink alcohol or use drugs  Imaging: no new      Lab Results   Component Value Date    WBC 8 26 11/17/2016    HGB 14 8 11/17/2016    HCT 45 4 11/17/2016    MCV 88 11/17/2016     11/17/2016     Lab Results   Component Value Date     11/17/2016    K 4 0 11/17/2016     11/17/2016    CO2 29 11/17/2016    ANIONGAP 8 11/17/2016    BUN 11 11/17/2016    CREATININE 0 81 11/17/2016    GLUCOSE 137 11/17/2016    CALCIUM 8 8 11/17/2016    AST 16 11/17/2016    ALT 28 11/17/2016    ALKPHOS 131 (H) 11/17/2016    PROT 7 5 11/17/2016    BILITOT 0 60 11/17/2016    EGFR >60 0 11/17/2016             PHYSICAL EXAM  General Appearance:    Alert, cooperative, no distress, NAD   Head:    Normocephalic without obvious abnormality   Eyes:    PERRL, conjunctiva/corneas clear, EOM's intact        Neck:   Supple, no adenopathy, no JVD   Back:     Symmetric, no spinal or CVA tenderness   Lungs:     Clear to auscultation bilaterally, no wheezing or rhonchi   Heart:  Breast:    Regular rate and rhythm, S1 and S2 normal, no murmur    normal appearance, no masses or tenderness, No nipple retraction or dimpling, No nipple discharge or bleeding, No axillary or supraclavicular adenopathy, Normal to palpation without dominant masses, breasts appear normal, no suspicious masses, no skin or nipple changes or axillary nodes  Abdomen:     Soft, nontender   Extremities:   Extremities normal  No clubbing, cyanosis or edema   Psych:   Normal Affect, AOx3  Neurologic:  Skin:   CNII-XII intact  Strength symmetric, speech intact    Warm, dry, intact, no visible rashes or lesions                       Signature:     Cammie Leone MD    Date: 2/12/2018 Time: 10:15 AM                 Some portions of this record may have been generated with voice recognition software  There may be translation, syntax,  or grammatical errors   Occasional wrong word or "sound-a-like" substitutions may have occurred due to the inherent limitations of the voice recognition software  Read the chart carefully and recognize, using context, where substitutions may have occurred  If you have any questions, please contact the dictating provider for clarification or correction, as needed  This encounter has been coded by a non-certified coder

## 2018-03-07 ENCOUNTER — TELEPHONE (OUTPATIENT)
Dept: SURGERY | Facility: CLINIC | Age: 68
End: 2018-03-07

## 2018-04-11 ENCOUNTER — HOSPITAL ENCOUNTER (OUTPATIENT)
Dept: MAMMOGRAPHY | Facility: HOSPITAL | Age: 68
Discharge: HOME | End: 2018-04-11
Attending: SURGERY
Payer: MEDICARE

## 2018-04-11 DIAGNOSIS — Z12.31 ENCOUNTER FOR SCREENING MAMMOGRAM FOR BREAST CANCER: ICD-10-CM

## 2018-04-11 PROCEDURE — 77067 SCR MAMMO BI INCL CAD: CPT

## 2018-04-17 ENCOUNTER — OFFICE VISIT (OUTPATIENT)
Dept: NEUROLOGY | Facility: CLINIC | Age: 68
End: 2018-04-17
Payer: MEDICARE

## 2018-04-17 VITALS
SYSTOLIC BLOOD PRESSURE: 175 MMHG | BODY MASS INDEX: 36.64 KG/M2 | WEIGHT: 227 LBS | HEART RATE: 86 BPM | DIASTOLIC BLOOD PRESSURE: 83 MMHG

## 2018-04-17 DIAGNOSIS — G25.0 TREMOR, ESSENTIAL: ICD-10-CM

## 2018-04-17 DIAGNOSIS — R56.9 SEIZURE (HCC): ICD-10-CM

## 2018-04-17 DIAGNOSIS — G31.84 MILD COGNITIVE IMPAIRMENT: ICD-10-CM

## 2018-04-17 DIAGNOSIS — R79.89 LOW VITAMIN D LEVEL: ICD-10-CM

## 2018-04-17 DIAGNOSIS — I63.89 CEREBROVASCULAR ACCIDENT (CVA) DUE TO OTHER MECHANISM (HCC): Primary | ICD-10-CM

## 2018-04-17 PROCEDURE — 99214 OFFICE O/P EST MOD 30 MIN: CPT | Performed by: PSYCHIATRY & NEUROLOGY

## 2018-04-17 RX ORDER — DONEPEZIL HYDROCHLORIDE 10 MG/1
TABLET, FILM COATED ORAL
Qty: 30 TABLET | Refills: 0
Start: 2018-04-17 | End: 2019-09-12 | Stop reason: SDUPTHER

## 2018-04-17 NOTE — PROGRESS NOTES
Patient ID: Kam Lawernce is a 79 y o  female  Assessment/Plan:   Problem List Items Addressed This Visit        Cardiovascular and Mediastinum    CVA (cerebral vascular accident) ([facility] [address] ) - Primary    Relevant Orders    MRI brain NeuroQuant wo and w contrast    BUN    Creatinine, serum       Nervous and Auditory    Mild cognitive impairment    Relevant Medications    donepezil (ARICEPT) 10 mg tablet    Other Relevant Orders    MRI brain NeuroQuant wo and w contrast    Vitamin B12    TSH, 3rd generation    Tremor, essential       Other    Seizure ([facility] [address] )    Relevant Orders    MRI brain NeuroQuant wo and w contrast      Other Visit Diagnoses     Low vitamin D level        Relevant Orders    Vitamin D 25 hydroxy          Mild cognitive impairment  -  She is to continue taking Aricept 10 mg once a day  -   We will check B12, vitamin-D,  TSH  -  Will get MRI of the head with neuro quant     CVA:  -  She is to continue taking Plavix 75 mg with today  -  She had a cerebellar infarct as well as occipital lobe infarct  This patient is having some balance issues  States that she does need help with bathing otherwise she is independent   -   We will get MRI of the head     Essential tremor:  -  She has no-no tremor noted  I will not start her on any medication at this time given she is having balance issues  If needed we could try primidone down the road  Subjective:  HPI   We had the pleasure of evaluating Kam Lawrence in neurological follow up today  As you know she is a 79year old right handed female  She originally lived in [place] and was a  there for 36 years  She is here today for follow up on her seizures and history of CVA in 2013  She currently lives in [place] [address] for the last one year and was in country [facility] before  She states she moved as the current place is cheaper  Seizures:   - She has past medical history significant for seizure disorder   Apparently, her epilepsy was well controlled for about the last 30 years while she was not taking any anticonvulsants  Over a period of a few weeks in October and November 2013, she had begun experiencing seizures in [place] and began taking Keppra at that time  Over this time, she also noted increased confusion and difficulty navigating her surroundings claiming she was walking into things  She came to the [facility] to live with her brother and her confusion continued to worsen as well as symptoms of dizziness  Her brother and his family also noted behaviors of walking into walls and difficulty navigating the house and she was brought to [address] [place] [address] on November 16, 2013 for evaluation  Upon admission she had CT scan and MRI were done  The MRI showing multiple infarcts in the PCA territory, were affecting both the cerebellar and occipital lobes  She was diagnosed with Dorothye Beans syndrome with cortical blindness, ataxia, encephalopathy, severe memory loss and chronic dizziness  She was continued on Keppra 500 mg b i d  She was found to have a high grade intracranial vertebral artery stenosis  Vascular did no believe she was a surgical intervention candidate and was put on Plavix and aspirin to take along with her Keppra and atorvastatin  - last seizure was in 2013 and has not had one since then  She is on Keppra 750mg bid  CVA:   No new concerns  She is here today with her brother who states that overall she is stable no new concerns  - She is on Plavix 75mg and aspirin 81mg once a day  -       Neurological deficit after the stroke: She has problem with depth perception and she is not able to see on the right  She is not able to contrast different colors  She is not able to process information that is not organized  She is also having balance issues  Essentially tremor:   - No-No tremor in head, still present but unchanged  Memory problems:   -   Her memory problems started after she had her stroke  Per patient she has not noticed any decline neither has her brother  She is currently taking Aricept 10 mg once a day  ROS reviewed      The following portions of the patient's history were reviewed and updated as appropriate: allergies, current medications, past family history, past medical history, past social history, past surgical history and problem list          Objective:    Blood pressure (!) 175/83, pulse 86, weight 103 kg (227 lb)  Physical Exam   Constitutional: She appears well-developed  Eyes: EOM are normal  Pupils are equal, round, and reactive to light  Neck: Normal range of motion  Neck supple  Cardiovascular: Normal rate  Pulmonary/Chest: Effort normal    Abdominal: Soft  Musculoskeletal: Normal range of motion  Neurological: She has normal strength and normal reflexes  Skin: Skin is warm  Psychiatric: She has a normal mood and affect  Her speech is normal        Neurological Exam    Mental Status  The patient is alert  Her speech is normal  Her language is fluent with no aphasia  She has normal attention span and concentration  Perkins today was 21/30     Cranial Nerves    CN III, IV, VI: The patient's pupils are equally round and reactive to light and ocular movements are normal   CN V: The patient has normal facial sensation  CN VII:  The patient has symmetric facial movement  CN VIII:  The patient's hearing is normal   CN IX, X: The patient has symmetric palate movement and normal gag reflex  CN XI: The patient's shoulder shrug strength is normal   CN XII: The patient's tongue is midline without atrophy or fasciculations  Right visual field defect     Motor  The patient has normal muscle bulk throughout  Her overall muscle tone is normal throughout  Her strength is 5/5 throughout all four extremities  Sensory  The patient's sensation is normal in all four extremities      Reflexes  Deep tendon reflexes are 2+ and symmetric in all four extremities with downgoing toes bilaterally  Gait and Coordination     Wide-based gait   patient has difficulty doing tandem gait   past-pointing noted bilaterally     ROS:    Review of Systems   Constitutional: Negative for activity change and appetite change  HENT: Negative  Eyes: Negative  Respiratory: Negative  Cardiovascular: Negative  Gastrointestinal: Negative  Endocrine: Negative  Genitourinary: Negative  Musculoskeletal: Negative  Skin: Negative  Allergic/Immunologic: Negative  Neurological: Negative  Head tremor,  Balance problem   Hematological: Negative  Psychiatric/Behavioral: Negative

## 2018-04-17 NOTE — PATIENT INSTRUCTIONS
Mild cognitive impairment  -  She is to continue taking Aricept 10 mg once a day  -   We will check B12, vitamin-D,  TSH  -  Will get MRI of the head with neuro quant     CVA:  -  She is to continue taking Plavix 75 mg with today  -  She had a cerebellar infarct as well as ox occipital lobe infarct  This patient is having some balance issues  States that she does need help with bathing otherwise she is independent   -   We will get MRI of the head     Essential tremor:  -  She has no-no tremor noted  I will not start her on any medication at this time given she is having balance issues  If needed we could try primidone down the road

## 2018-05-03 ENCOUNTER — APPOINTMENT (OUTPATIENT)
Dept: LAB | Facility: CLINIC | Age: 68
End: 2018-05-03
Payer: MEDICARE

## 2018-05-03 DIAGNOSIS — G31.84 MILD COGNITIVE IMPAIRMENT: ICD-10-CM

## 2018-05-03 DIAGNOSIS — R79.89 LOW VITAMIN D LEVEL: Primary | ICD-10-CM

## 2018-05-03 DIAGNOSIS — R79.89 LOW VITAMIN D LEVEL: ICD-10-CM

## 2018-05-03 DIAGNOSIS — I63.89 CEREBROVASCULAR ACCIDENT (CVA) DUE TO OTHER MECHANISM (HCC): ICD-10-CM

## 2018-05-03 LAB
25(OH)D3 SERPL-MCNC: 7.8 NG/ML (ref 30–100)
BUN SERPL-MCNC: 14 MG/DL (ref 5–25)
CREAT SERPL-MCNC: 1.03 MG/DL (ref 0.6–1.3)
GFR SERPL CREATININE-BSD FRML MDRD: 56 ML/MIN/1.73SQ M
TSH SERPL DL<=0.05 MIU/L-ACNC: 1.55 UIU/ML (ref 0.36–3.74)
VIT B12 SERPL-MCNC: 162 PG/ML (ref 100–900)

## 2018-05-03 PROCEDURE — 82565 ASSAY OF CREATININE: CPT

## 2018-05-03 PROCEDURE — 84443 ASSAY THYROID STIM HORMONE: CPT

## 2018-05-03 PROCEDURE — 82306 VITAMIN D 25 HYDROXY: CPT

## 2018-05-03 PROCEDURE — 82607 VITAMIN B-12: CPT

## 2018-05-03 PROCEDURE — 84520 ASSAY OF UREA NITROGEN: CPT

## 2018-05-03 PROCEDURE — 36415 COLL VENOUS BLD VENIPUNCTURE: CPT

## 2018-05-03 RX ORDER — CHOLECALCIFEROL (VITAMIN D3) 1250 MCG
CAPSULE ORAL
Qty: 12 CAPSULE | Refills: 0 | Status: SHIPPED | OUTPATIENT
Start: 2018-05-03 | End: 2018-09-26 | Stop reason: SDUPTHER

## 2018-05-07 ENCOUNTER — TELEPHONE (OUTPATIENT)
Dept: NEUROLOGY | Facility: CLINIC | Age: 68
End: 2018-05-07

## 2018-05-07 ENCOUNTER — TELEPHONE (OUTPATIENT)
Dept: FAMILY MEDICINE CLINIC | Facility: CLINIC | Age: 68
End: 2018-05-07

## 2018-05-07 DIAGNOSIS — E53.8 LOW SERUM VITAMIN B12: Primary | ICD-10-CM

## 2018-05-07 RX ORDER — CYANOCOBALAMIN (VITAMIN B-12) 500 MCG
TABLET ORAL
Qty: 60 TABLET | Refills: 11 | Status: SHIPPED | OUTPATIENT
Start: 2018-05-07 | End: 2019-03-25 | Stop reason: ALTCHOICE

## 2018-05-07 NOTE — TELEPHONE ENCOUNTER
Patient's niece called back to ask if she should cancel the appointment for the injection tomorrow, states the nursing home can do the injections

## 2018-05-07 NOTE — TELEPHONE ENCOUNTER
from [facility] dropped off paper for dr Willis Parada to sign and fax back, will put in the doctor's bin   Quality care management plan form

## 2018-05-07 NOTE — PROGRESS NOTES
Spoke to the nurse at the nursing home in regards to the b12 results  They will take care of getting the pt OTC b12 1000 mcg per day  I need to speak with family to set up weekly b12 injections  lmom both numbers to call me back to get those set up

## 2018-05-08 NOTE — TELEPHONE ENCOUNTER
Completed and personally faxed over this morning  Placed in bin to scan into medical record  Thank you!

## 2018-05-10 ENCOUNTER — HOSPITAL ENCOUNTER (OUTPATIENT)
Dept: MRI IMAGING | Facility: HOSPITAL | Age: 68
Discharge: HOME | End: 2018-05-10
Attending: PSYCHIATRY & NEUROLOGY
Payer: MEDICARE

## 2018-05-10 DIAGNOSIS — R56.9 SEIZURE (HCC): ICD-10-CM

## 2018-05-10 DIAGNOSIS — I63.89 CEREBROVASCULAR ACCIDENT (CVA) DUE TO OTHER MECHANISM (HCC): ICD-10-CM

## 2018-05-10 DIAGNOSIS — G31.84 MILD COGNITIVE IMPAIRMENT: ICD-10-CM

## 2018-05-10 DIAGNOSIS — E53.8 LOW VITAMIN B12 LEVEL: Primary | ICD-10-CM

## 2018-05-10 PROCEDURE — A9585 GADOBUTROL INJECTION: HCPCS | Performed by: PSYCHIATRY & NEUROLOGY

## 2018-05-10 PROCEDURE — 70553 MRI BRAIN STEM W/O & W/DYE: CPT

## 2018-05-10 RX ADMIN — GADOBUTROL 10 ML: 604.72 INJECTION INTRAVENOUS at 12:03

## 2018-05-24 ENCOUNTER — OFFICE VISIT (OUTPATIENT)
Dept: FAMILY MEDICINE CLINIC | Facility: CLINIC | Age: 68
End: 2018-05-24
Payer: MEDICARE

## 2018-05-24 VITALS
RESPIRATION RATE: 18 BRPM | SYSTOLIC BLOOD PRESSURE: 126 MMHG | HEART RATE: 78 BPM | TEMPERATURE: 99.4 F | DIASTOLIC BLOOD PRESSURE: 80 MMHG | WEIGHT: 224 LBS | HEIGHT: 66 IN | BODY MASS INDEX: 36 KG/M2

## 2018-05-24 DIAGNOSIS — I10 ESSENTIAL HYPERTENSION: ICD-10-CM

## 2018-05-24 DIAGNOSIS — I63.89 CEREBROVASCULAR ACCIDENT (CVA) DUE TO OTHER MECHANISM (HCC): ICD-10-CM

## 2018-05-24 DIAGNOSIS — G31.84 MILD COGNITIVE IMPAIRMENT: Primary | ICD-10-CM

## 2018-05-24 DIAGNOSIS — E78.5 HYPERLIPIDEMIA, UNSPECIFIED HYPERLIPIDEMIA TYPE: ICD-10-CM

## 2018-05-24 DIAGNOSIS — R56.9 SEIZURE (HCC): ICD-10-CM

## 2018-05-24 PROCEDURE — 99213 OFFICE O/P EST LOW 20 MIN: CPT | Performed by: FAMILY MEDICINE

## 2018-05-24 NOTE — PROGRESS NOTES
Laura Goldberg 1950 female MRN: [id number]    Family Medicine Follow-up Visit    ASSESSMENT/PLAN  Problem List Items Addressed This Visit        Cardiovascular and Mediastinum    CVA (cerebral vascular accident) [facility])     Follows with neuro, on ASA and plavix         Essential hypertension     Blood pressure controlled  Continue HCTZ 25 mg daily            Nervous and Auditory    Mild cognitive impairment - Primary     Cared for at [address] completed today  Stable on donepazil            Other    Seizure [facility])     Follows with neurology, stable on Keppra         HLD (hyperlipidemia)     Stable tolerating atorvastatin               HM: Colonoscopy 3/8/18- tubular adenoma- Follows with Dr Josue Barrera- rec 5 year follow up     Future Appointments  Date Time Provider Joslyn Poole   10/23/2018 2:00 PM Tiana Farmer MD NEURO [facility]          SUBJECTIVE  CC: Hypertension      HPI:  Laura Goldberg is a 79 y o  female who presents for follow up  Patient states she is doing well, offers no complaints today  Had follow up with neurology, had MRI done, receiving b12 injections  Doing well  HPI    Review of Systems   Constitutional: Negative for chills and fever  Eyes: Negative for photophobia and visual disturbance  Respiratory: Negative for cough, shortness of breath and wheezing  Cardiovascular: Negative for chest pain, palpitations and leg swelling  Gastrointestinal: Negative for abdominal pain, constipation, diarrhea, nausea and vomiting  Genitourinary: Negative for difficulty urinating and dysuria  Musculoskeletal: Negative for arthralgias and back pain  Neurological: Negative for dizziness, syncope, weakness and light-headedness         Historical Information   The patient history was reviewed as follows:    Past Medical History:   Diagnosis Date    Diverticulosis     Hypertension     [facility] [address] [facility]     Pancreatic cyst     Seizures ([facility] [address] )     last seizure 2013    Stroke [facility])     Wears glasses      Past Surgical History:   Procedure Laterality Date    APPENDECTOMY      COLONOSCOPY      complete    OR COLONOSCOPY FLX DX W/COLLJ SPEC WHEN PFRMD N/A 3/6/2018    Procedure: COLONOSCOPY with polypectomy;  Surgeon: Cesar Cardenas MD;  Location: [facility]; Service: General     Family History   Problem Relation Age of Onset    Stroke Father     Lung cancer Maternal Grandfather     Ovarian cancer Mother      Left ovary     Lung cancer Paternal Grandfather     Other Other      MSH6-related Powers syndrome     Stroke Family       Social History   History   Alcohol Use No     History   Drug Use No     History   Smoking Status    Never Smoker   Smokeless Tobacco    Never Used       Medications:     Current Outpatient Prescriptions:     aspirin 81 mg chewable tablet, Chew 1 tablet daily, Disp: , Rfl:     atorvastatin (LIPITOR) 80 mg tablet, Take by mouth, Disp: , Rfl:     Cholecalciferol (VITAMIN D3) 26855 units CAPS, 1 tab once a day, Disp: 12 capsule, Rfl: 0    clopidogrel (PLAVIX) 75 mg tablet, Take 1 tablet (75 mg total) by mouth daily Wait 48 hours before starting next dose , Disp: 1 tablet, Rfl: 0    Cyanocobalamin 1000 MCG/ML KIT, INJECT 1ML ONCE A WEEK FOR 8 WEEKS   IM INTO DELTOID , Disp: 1 mL, Rfl: 0    cyclobenzaprine (FLEXERIL) 5 mg tablet, Take 1 tablet by mouth, Disp: , Rfl:     donepezil (ARICEPT) 10 mg tablet, 1 tab daily, Disp: 30 tablet, Rfl: 0    hydrochlorothiazide (MICROZIDE) 12 5 mg capsule, , Disp: , Rfl:     levETIRAcetam (KEPPRA) 750 mg tablet, Take by mouth, Disp: , Rfl:     oxyCODONE-acetaminophen (PERCOCET) 5-325 mg per tablet, Take 1 tablet by mouth every 6 (six) hours as needed for moderate pain Max Daily Amount: 4 tablets, Disp: 12 tablet, Rfl: 0    vitamin B-12 (CYANOCOBALAMIN) 500 MCG TABS, Two tabs daily, Disp: 60 tablet, Rfl: 11    hydrochlorothiazide (HYDRODIURIL) 25 mg tablet, Take 0 5 tablets (12 5 mg total) by mouth daily, Disp: 30 tablet, Rfl: 2  No Known Allergies    OBJECTIVE    Vitals:   Vitals:    05/24/18 1621   BP: 126/80   Pulse: 78   Resp: 18   Temp: 99 4 °F (37 4 °C)   Weight: 102 kg (224 lb)   Height: 5' 6" (1 676 m)           Physical Exam   Constitutional: She is oriented to person, place, and time  She appears well-developed and well-nourished  HENT:   Head: Normocephalic and atraumatic  Mouth/Throat: Oropharynx is clear and moist    Eyes: Pupils are equal, round, and reactive to light  Neck: Normal range of motion  Neck supple  Cardiovascular: Normal rate, regular rhythm and normal heart sounds  Pulmonary/Chest: Effort normal and breath sounds normal  No respiratory distress  She has no wheezes  Abdominal: Soft  Bowel sounds are normal  She exhibits no distension  There is no tenderness  Musculoskeletal: Normal range of motion  She exhibits no edema or tenderness  Neurological: She is alert and oriented to person, place, and time  Resting tremor    Skin: Skin is warm and dry  Psychiatric: She has a normal mood and affect   Her behavior is normal           Labs:        Charity Banks DO, PGY-3  [facility]   5/24/2018

## 2018-06-05 ENCOUNTER — TELEPHONE (OUTPATIENT)
Dept: FAMILY MEDICINE CLINIC | Facility: CLINIC | Age: 68
End: 2018-06-05

## 2018-06-05 NOTE — TELEPHONE ENCOUNTER
Can you please clarify where this request is? I just checked my bin in triage and I dont see any paperwork to complete or review   Thank you

## 2018-06-05 NOTE — TELEPHONE ENCOUNTER
Request for signature Incident Report for [facility]  Thank you

## 2018-09-24 ENCOUNTER — OFFICE VISIT (OUTPATIENT)
Dept: FAMILY MEDICINE CLINIC | Facility: CLINIC | Age: 68
End: 2018-09-24
Payer: MEDICARE

## 2018-09-24 VITALS
BODY MASS INDEX: 35.36 KG/M2 | SYSTOLIC BLOOD PRESSURE: 140 MMHG | WEIGHT: 220 LBS | HEIGHT: 66 IN | RESPIRATION RATE: 18 BRPM | HEART RATE: 84 BPM | TEMPERATURE: 98.6 F | DIASTOLIC BLOOD PRESSURE: 76 MMHG

## 2018-09-24 DIAGNOSIS — R56.9 SEIZURE (HCC): ICD-10-CM

## 2018-09-24 DIAGNOSIS — E78.49 OTHER HYPERLIPIDEMIA: Primary | ICD-10-CM

## 2018-09-24 DIAGNOSIS — I10 ESSENTIAL HYPERTENSION: ICD-10-CM

## 2018-09-24 DIAGNOSIS — G31.84 MILD COGNITIVE IMPAIRMENT: ICD-10-CM

## 2018-09-24 DIAGNOSIS — I63.019 CEREBROVASCULAR ACCIDENT (CVA) DUE TO THROMBOSIS OF VERTEBRAL ARTERY, UNSPECIFIED BLOOD VESSEL LATERALITY (HCC): ICD-10-CM

## 2018-09-24 DIAGNOSIS — E55.9 VITAMIN D DEFICIENCY: ICD-10-CM

## 2018-09-24 PROCEDURE — 99213 OFFICE O/P EST LOW 20 MIN: CPT | Performed by: FAMILY MEDICINE

## 2018-09-24 NOTE — PATIENT INSTRUCTIONS
How to eat healthy:  My Plate is a model for planning healthy meals  It shows the types and amounts of foods that should go on your plate  Fruits and vegetables make up about half of your plate, and grains and protein make up the other half  A serving of dairy is included on the side of your plate  The amount of calories and serving sizes you need depends on your age, gender, weight, and height  Examples of healthy foods are listed below:  · Eat a variety of vegetables  such as dark green, red, and orange vegetables  You can also include canned vegetables low in sodium (salt) and frozen vegetables without added butter or sauces  · Eat a variety of fresh fruits , canned fruit in 100% juice, frozen fruit, and dried fruit  · Include whole grains  At least half of the grains you eat should be whole grains  Examples include whole-wheat bread, wheat pasta, brown rice, and whole-grain cereals such as oatmeal     · Eat a variety of protein foods such as seafood (fish and shellfish), lean meat, and poultry without skin (turkey and chicken)  Examples of lean meats include pork leg, shoulder, or tenderloin, and beef round, sirloin, tenderloin, and extra lean ground beef  Other protein foods include eggs and egg substitutes, beans, peas, soy products, nuts, and seeds  · Choose low-fat dairy products such as skim or 1% milk or low-fat yogurt, cheese, and cottage cheese  · Limit unhealthy fats  such as butter, hard margarine, and shortening  Exercise:  Exercise at least 30 minutes per day on most days of the week  Some examples of exercise include walking, biking, dancing, and swimming  You can also fit in more physical activity by taking the stairs instead of the elevator or parking farther away from stores  Include muscle strengthening activities 2 days each week  Regular exercise provides many health benefits   It helps you manage your weight, and decreases your risk for type 2 diabetes, heart disease, stroke, and high blood pressure  Exercise can also help improve your mood  Ask your healthcare provider about the best exercise plan for you

## 2018-09-24 NOTE — ASSESSMENT & PLAN NOTE
Prescribed Vit D 50k units weekly for 12 weeks, but pt was unsure if she has had it or not  Currently, no Vit D supplementation    Recheck Vit D today

## 2018-09-24 NOTE — ASSESSMENT & PLAN NOTE
Stable, no further seizures since 11/2013, f/u w/ Dr Mcghee Monday, last seen 4/2018  Continue Keppra 750 BID

## 2018-09-24 NOTE — PROGRESS NOTES
Laura Goldberg 1950 female MRN: [id number]    Family Medicine Acute Visit    ASSESSMENT/PLAN   Problem List Items Addressed This Visit        Cardiovascular and Mediastinum    CVA (cerebral vascular accident) ([address] )     Stable, taking ASA 81 mg daily and Plavix 75 daily, f/u w/ Dr Vital Prudent hypertension     Today 140/72, stable, at goal, continue HCTZ 12 5 daily  Nervous and Auditory    Mild cognitive impairment     Stable, continue Donepezil 10 daily  F/u w/ Neurologist             Other    Seizure [facility])     Stable, no further seizures since 11/2013, f/u w/   [facility], last seen 4/2018  Continue Keppra 750 BID  HLD (hyperlipidemia) - Primary     Stable, continue Lipitor 80 mg daily         Vitamin D deficiency     Prescribed Vit D 50k units weekly for 12 weeks, but pt was unsure if she has had it or not  Currently, no Vit D supplementation  Recheck Vit D today         Relevant Orders    Vitamin D 25 hydroxy      Filled out the progress note at [facility] for the patient today  Future Appointments  Date Time Provider Joslyn Poole   10/23/2018 2:00 PM Tiana Farmer MD NEURO [facility]   3/25/2019 1:00 PM Aliya Fonseca,  [facility]          SUBJECTIVE  CC: Hypertension      HPI:  Laura Goldberg is a 76 y o  female who presents for chronic condition f/u  Living in [facility] assistance living  Doing well  F/u w/ Neurology 10/23 for h/o CVA, sezirues 2/2 CVA, Mild cognitive impairment  Eating, drinking good, but lose 4 lbs within 4 months  Denies CP, palpitation, SOB, wheezes, cough, abd pain, urinary symptoms, n/v/constipation,  Sometimes loose stool if she eats ice cream   No seizures, headache  Sleeping well  Birthday yesterday, brought home to celebrate with her family  Happy and pleasant        Review of Systems  As above    Historical Information   The patient history was reviewed as follows:  Past Medical History: Diagnosis Date    Diverticulosis     Hypertension     Morbid [facility] )     Pancreatic cyst     Seizures ([facility] )     last seizure 2013    Stroke [facility])     Wears glasses          Past Surgical History:   Procedure Laterality Date    APPENDECTOMY      COLONOSCOPY      complete    [place] COLONOSCOPY FLX DX W/COLLJ SPEC WHEN PFRMD N/A 3/6/2018    Procedure: COLONOSCOPY with polypectomy;  Surgeon: Rexann Klinefelter, MD;  Location: [facility]; Service: General     Family History   Problem Relation Age of Onset    Stroke Father     Lung cancer Maternal Grandfather     Ovarian cancer Mother         Left ovary     Lung cancer Paternal Grandfather     Other Other         MSH6-related Powers syndrome     Stroke Family       Social History   History   Alcohol Use No     History   Drug Use No     History   Smoking Status    Never Smoker   Smokeless Tobacco    Never Used       Medications:     Current Outpatient Prescriptions:     aspirin 81 mg chewable tablet, Chew 1 tablet daily, Disp: , Rfl:     atorvastatin (LIPITOR) 80 mg tablet, Take by mouth, Disp: , Rfl:     Cholecalciferol (VITAMIN D3) 84704 units CAPS, 1 tab once a day, Disp: 12 capsule, Rfl: 0    clopidogrel (PLAVIX) 75 mg tablet, Take 1 tablet (75 mg total) by mouth daily Wait 48 hours before starting next dose , Disp: 1 tablet, Rfl: 0    Cyanocobalamin 1000 MCG/ML KIT, INJECT 1ML ONCE A WEEK FOR 8 WEEKS   IM INTO DELTOID , Disp: 1 mL, Rfl: 0    cyclobenzaprine (FLEXERIL) 5 mg tablet, Take 1 tablet by mouth, Disp: , Rfl:     donepezil (ARICEPT) 10 mg tablet, 1 tab daily, Disp: 30 tablet, Rfl: 0    hydrochlorothiazide (MICROZIDE) 12 5 mg capsule, , Disp: , Rfl:     levETIRAcetam (KEPPRA) 750 mg tablet, Take by mouth, Disp: , Rfl:     vitamin B-12 (CYANOCOBALAMIN) 500 MCG TABS, Two tabs daily, Disp: 60 tablet, Rfl: 11    hydrochlorothiazide (HYDRODIURIL) 25 mg tablet, Take 0 5 tablets (12 5 mg total) by mouth daily (Patient not taking: Reported on 9/24/2018 ), Disp: 30 tablet, Rfl: 2    oxyCODONE-acetaminophen (PERCOCET) 5-325 mg per tablet, Take 1 tablet by mouth every 6 (six) hours as needed for moderate pain Max Daily Amount: 4 tablets (Patient not taking: Reported on 9/24/2018 ), Disp: 12 tablet, Rfl: 0    No Known Allergies    OBJECTIVE  Vitals:   Vitals:    09/24/18 1430   BP: 140/76   Pulse: 84   Resp: 18   Temp: 98 6 °F (37 °C)   Weight: 99 8 kg (220 lb)   Height: 5' 6" (1 676 m)         Physical Exam   Constitutional: She is oriented to person, place, and time  She appears well-developed and well-nourished  No distress  HENT:   Head: Normocephalic  Mouth/Throat: Oropharynx is clear and moist    Eyes: Pupils are equal, round, and reactive to light  Right eye exhibits no discharge  Left eye exhibits no discharge  No scleral icterus  Neck: Normal range of motion  Neck supple  Cardiovascular: Normal rate, regular rhythm and normal heart sounds  Exam reveals no friction rub  No murmur heard  Pulmonary/Chest: Effort normal and breath sounds normal  No respiratory distress  She has no wheezes  She has no rales  Abdominal: Soft  Bowel sounds are normal  She exhibits no distension  There is no tenderness  There is no rebound and no guarding  Musculoskeletal: Normal range of motion  She exhibits no edema, tenderness or deformity  Lymphadenopathy:     She has no cervical adenopathy  Neurological: She is alert and oriented to person, place, and time  Tremor (+)    Skin: Skin is warm  No rash noted  She is not diaphoretic  No erythema  Psychiatric: She has a normal mood and affect  Nursing note and vitals reviewed         Janett Wilkinson MD, PGY-2  [facility]   9/24/2018

## 2018-09-26 DIAGNOSIS — E55.9 VITAMIN D DEFICIENCY: Primary | ICD-10-CM

## 2018-09-26 RX ORDER — ERGOCALCIFEROL 1.25 MG/1
50000 CAPSULE ORAL WEEKLY
Qty: 8 CAPSULE | Refills: 0 | Status: SHIPPED | OUTPATIENT
Start: 2018-09-26 | End: 2019-03-25 | Stop reason: ALTCHOICE

## 2018-09-27 ENCOUNTER — TELEPHONE (OUTPATIENT)
Dept: FAMILY MEDICINE CLINIC | Facility: CLINIC | Age: 68
End: 2018-09-27

## 2018-09-27 NOTE — PROGRESS NOTES
Patient was recently seen by Dr Divina Helton on 9/24/18 for follow-up of chronic conditions and Vitamin D level was ordered due to history of Vitamin D deficiency, patient unsure if she took the 50,000 unit weekly therapy previously  Vitamin D level resulted 8 on 9/25/18  Plan- Prescribed Vitamin D2 50,000 units to take weekly for 8 weeks  Will recheck Vitamin D level after completing therapy (after Nov 20th )     Clinical team- Could you please contact patient and let her know of plan (I am on night float ) If she has any questions I can try and reach out, or Dr Divina Helton could contact her as well  Thank you!      Charanjit Tang, DO PGY-2  [place] [address]

## 2018-10-04 ENCOUNTER — IMMUNIZATION (OUTPATIENT)
Dept: GERIATRICS | Age: 68
End: 2018-10-04
Payer: MEDICARE

## 2018-10-04 DIAGNOSIS — Z23 ENCOUNTER FOR IMMUNIZATION: ICD-10-CM

## 2018-10-04 PROCEDURE — 90662 IIV NO PRSV INCREASED AG IM: CPT | Performed by: NURSE PRACTITIONER

## 2018-10-04 PROCEDURE — G0008 ADMIN INFLUENZA VIRUS VAC: HCPCS | Performed by: NURSE PRACTITIONER

## 2018-10-22 NOTE — PROGRESS NOTES
Patient ID: Soni Thomason is a 76 y o  female  Assessment/Plan:   Problem List Items Addressed This Visit        Cardiovascular and Mediastinum    CVA (cerebral vascular accident) ([address] ) - Primary    Relevant Orders    CTA head and neck w wo contrast    ANSELMO    Ambulatory referral to Neurology    BUN    Creatinine, serum       Nervous and Auditory    Mild cognitive impairment    Tremor, essential       Other    Seizure (HCC)         Mild cognitive impairment  -  She is to continue taking Aricept 10 mg once a day       CVA:  -  She is to continue taking Plavix 75 mg  And aspirin 81 milligrams  Will refer her to our stroke specialist for further evaluation as patient is found to have 2 new lesions on her MRI head  -   Discussed with our stroke specialist will get CTA of the head and neck and ANSELMO   - She will see patient on November 1st and discuss further medication management         Essential tremor:  -  She has no-no tremor noted  She is not too concerned about her tremor, thus  will hold off on trying any medication at this time  Subjective:  HPI  We had the pleasure of evaluating Soni Thomason in neurological follow up today  As you know she is a 79year old right handed female  She originally lived in [place] and was a  there for 36 years  She is here today for follow up on her seizures and history of CVA in 2013  She currently lives in [facility]  Early 2017  and [place]  She states she moved as the current place is cheaper       Seizures:   -       She has past medical history significant for seizure disorder  Apparently, her epilepsy was well controlled for about the last 30 years while she was not taking any anticonvulsants  Over a period of a few weeks in October and November 2013, she had begun experiencing seizures in [place] and began taking Keppra at that time   Over this time, she also noted increased confusion and difficulty navigating her surroundings claiming she was walking into things  She came to the [facility] to live with her brother and her confusion continued to worsen as well as symptoms of dizziness  Her brother and his family also noted behaviors of walking into walls and difficulty navigating the house and she was brought to [address] [place] [address] on November 16, 2013 for evaluation  Upon admission she had CT scan and MRI were done  The MRI showing multiple infarcts in the PCA territory, were affecting both the cerebellar and occipital lobes  She was 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, ataxia, encephalopathy, severe memory loss and chronic dizziness  She was continued on Keppra 500 mg b i d  She was found to have a high grade intracranial vertebral artery stenosis  Vascular did no believe she was a surgical intervention candidate and was put on Plavix and aspirin to take along with her Keppra and atorvastatin  -       last seizure was in 2013 and has not had one since then  She is on Keppra 750mg bid          CVA:   No new concerns  She is here today with her brother who states that overall she is stable no new concerns  -       She is on Plavix 75mg and aspirin 81mg once a day  -       Neurological deficit after the stroke: She has problem with depth perception and she is not able to see on the right  She is not able to contrast different colors  She is not able to process information that is not organized  She is also having balance issues         Essentially tremor:   -       No-No tremor in head, still present but unchanged          Memory problems:   -         Her memory problems started after she had her stroke  Per patient she has not noticed any decline neither has her brother  She is currently taking Aricept 10 mg once a day      MRI head with Neuro-quant:   IMPRESSION:   1   There may be 2 tiny foci of recent ischemia, one each in the right frontal and left parieto-occipital region    Diffuse generalized volume loss, advanced chronic large and small vessel ischemia  2   NeuroQuant analysis was performed: There does appear to be selective volume loss of the hippocampus and prominence of the temporal horn 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  This occurs in the face of diffuse chronic   large and small vessel ischemia  The following portions of the patient's history were reviewed and updated as appropriate: allergies, current medications, past family history, past medical history, past social history, past surgical history and problem list          Objective:    Blood pressure 140/86, pulse 68, height 5' 7" (1 702 m), weight 99 3 kg (219 lb)  Physical Exam   Constitutional: She appears well-developed  Eyes: Pupils are equal, round, and reactive to light  Neck: Normal range of motion  Neck supple  Cardiovascular: Normal rate  Pulmonary/Chest: Effort normal    Abdominal: Soft  Musculoskeletal: Normal range of motion  Skin: Skin is warm  Psychiatric: She has a normal mood and affect  Neurological Exam    Cranial Nerves  CN III, IV, VI: Pupils equal round and reactive to light bilaterally        Mental Status  The patient is alert  Her speech is normal  Her language is fluent with no aphasia  She has normal attention span and concentration  [place] 4/17/2018 - 21/30   [place] 10/23/2018 - 26/30     Cranial Nerves     CN III, IV, VI: The patient's pupils are equally round and reactive to light and ocular movements are normal   CN V: The patient has normal facial sensation  CN VII:  The patient has symmetric facial movement  CN VIII:  The patient's hearing is normal   CN IX, X: The patient has symmetric palate movement and normal gag reflex  CN XI: The patient's shoulder shrug strength is normal   CN XII: The patient's tongue is midline without atrophy or fasciculations     Right visual field defect      Motor  The patient has normal muscle bulk throughout  Her overall muscle tone is normal throughout  Her strength is 5/5 throughout all four extremities      Sensory  The patient's sensation is normal in all four extremities      Reflexes  Deep tendon reflexes are 2+ and symmetric in all four extremities with downgoing toes bilaterally      Gait and Coordination:    Wide-based gait   patient has difficulty doing tandem gait   past-pointing noted bilaterally     ROS:  Review of Systems  Review of Systems   Constitutional: Positive for fatigue  Gastrointestinal: Positive for diarrhea  Neurological: Positive for tremors (head)  Psychiatric/Behavioral: Positive for sleep disturbance

## 2018-10-23 ENCOUNTER — OFFICE VISIT (OUTPATIENT)
Dept: NEUROLOGY | Facility: CLINIC | Age: 68
End: 2018-10-23
Payer: MEDICARE

## 2018-10-23 VITALS
SYSTOLIC BLOOD PRESSURE: 140 MMHG | DIASTOLIC BLOOD PRESSURE: 86 MMHG | HEART RATE: 68 BPM | WEIGHT: 219 LBS | BODY MASS INDEX: 34.37 KG/M2 | HEIGHT: 67 IN

## 2018-10-23 DIAGNOSIS — I63.019 CEREBROVASCULAR ACCIDENT (CVA) DUE TO THROMBOSIS OF VERTEBRAL ARTERY, UNSPECIFIED BLOOD VESSEL LATERALITY (HCC): Primary | ICD-10-CM

## 2018-10-23 DIAGNOSIS — R56.9 SEIZURE (HCC): ICD-10-CM

## 2018-10-23 DIAGNOSIS — G31.84 MILD COGNITIVE IMPAIRMENT: ICD-10-CM

## 2018-10-23 DIAGNOSIS — G25.0 TREMOR, ESSENTIAL: ICD-10-CM

## 2018-10-23 PROCEDURE — 99214 OFFICE O/P EST MOD 30 MIN: CPT | Performed by: PSYCHIATRY & NEUROLOGY

## 2018-10-23 NOTE — PROGRESS NOTES
Patient ID: Johan Garcia is a 76 y o  female  Assessment/Plan:    No problem-specific Assessment & Plan notes found for this encounter  {Assess/PlanSmartLinks:30814}       Subjective:    HPI    {[facility] HPI texts:88371}    {Common ambulatory SmartLinks:30203}         Objective:    Blood pressure 140/86, pulse 68, height 5' 7" (1 702 m), weight 99 3 kg (219 lb)  Physical Exam    Neurological Exam      ROS:    Review of Systems   Constitutional: Positive for fatigue  Gastrointestinal: Positive for diarrhea  Neurological: Positive for tremors (head)  Psychiatric/Behavioral: Positive for sleep disturbance

## 2018-10-23 NOTE — PATIENT INSTRUCTIONS
Mild cognitive impairment  -  She is to continue taking Aricept 10 mg once a day       CVA:  -  She is to continue taking Plavix 75 mg  And aspirin 81 milligrams  Will refer her to our stroke specialist for further evaluation as patient is found to have 2 new lesions on her MRI head  -   Discussed with our stroke specialist will get CTA of the head and neck and ANSELMO   - She will see patient on November 1st and discuss further medication management         Essential tremor:  -  She has no-no tremor noted  She is not too concerned about her tremor, thus  will hold off on trying any medication at this time

## 2018-11-01 ENCOUNTER — ANESTHESIA (OUTPATIENT)
Dept: NON INVASIVE DIAGNOSTICS | Facility: HOSPITAL | Age: 68
End: 2018-11-01

## 2018-11-01 ENCOUNTER — HOSPITAL ENCOUNTER (OUTPATIENT)
Dept: NON INVASIVE DIAGNOSTICS | Facility: HOSPITAL | Age: 68
Discharge: HOME | End: 2018-11-01
Payer: MEDICARE

## 2018-11-01 ENCOUNTER — ANESTHESIA EVENT (OUTPATIENT)
Dept: NON INVASIVE DIAGNOSTICS | Facility: HOSPITAL | Age: 68
End: 2018-11-01

## 2018-11-01 VITALS
OXYGEN SATURATION: 96 % | SYSTOLIC BLOOD PRESSURE: 136 MMHG | HEART RATE: 96 BPM | RESPIRATION RATE: 18 BRPM | DIASTOLIC BLOOD PRESSURE: 59 MMHG

## 2018-11-01 DIAGNOSIS — I63.019 CEREBROVASCULAR ACCIDENT (CVA) DUE TO THROMBOSIS OF VERTEBRAL ARTERY, UNSPECIFIED BLOOD VESSEL LATERALITY (HCC): ICD-10-CM

## 2018-11-01 PROCEDURE — 93312 ECHO TRANSESOPHAGEAL: CPT

## 2018-11-01 PROCEDURE — 93325 DOPPLER ECHO COLOR FLOW MAPG: CPT | Performed by: INTERNAL MEDICINE

## 2018-11-01 PROCEDURE — 93320 DOPPLER ECHO COMPLETE: CPT | Performed by: INTERNAL MEDICINE

## 2018-11-01 PROCEDURE — 93312 ECHO TRANSESOPHAGEAL: CPT | Performed by: INTERNAL MEDICINE

## 2018-11-01 RX ORDER — SODIUM CHLORIDE 9 MG/ML
INJECTION, SOLUTION INTRAVENOUS CONTINUOUS PRN
Status: DISCONTINUED | OUTPATIENT
Start: 2018-11-01 | End: 2018-11-01 | Stop reason: SURG

## 2018-11-01 RX ORDER — SODIUM CHLORIDE 9 MG/ML
50 INJECTION, SOLUTION INTRAVENOUS CONTINUOUS
Status: CANCELLED | OUTPATIENT
Start: 2018-11-01

## 2018-11-01 RX ORDER — PROPOFOL 10 MG/ML
INJECTION, EMULSION INTRAVENOUS CONTINUOUS PRN
Status: DISCONTINUED | OUTPATIENT
Start: 2018-11-01 | End: 2018-11-01 | Stop reason: SURG

## 2018-11-01 RX ORDER — LIDOCAINE HYDROCHLORIDE 10 MG/ML
INJECTION, SOLUTION INFILTRATION; PERINEURAL AS NEEDED
Status: DISCONTINUED | OUTPATIENT
Start: 2018-11-01 | End: 2018-11-01 | Stop reason: SURG

## 2018-11-01 RX ORDER — PROPOFOL 10 MG/ML
INJECTION, EMULSION INTRAVENOUS AS NEEDED
Status: DISCONTINUED | OUTPATIENT
Start: 2018-11-01 | End: 2018-11-01 | Stop reason: SURG

## 2018-11-01 RX ADMIN — SODIUM CHLORIDE: 0.9 INJECTION, SOLUTION INTRAVENOUS at 13:10

## 2018-11-01 RX ADMIN — LIDOCAINE HYDROCHLORIDE 100 MG: 10 INJECTION, SOLUTION INFILTRATION; PERINEURAL at 13:26

## 2018-11-01 RX ADMIN — PROPOFOL 150 MG: 10 INJECTION, EMULSION INTRAVENOUS at 13:26

## 2018-11-01 RX ADMIN — PROPOFOL 100 MCG/KG/MIN: 10 INJECTION, EMULSION INTRAVENOUS at 13:26

## 2018-11-01 NOTE — ANESTHESIA PREPROCEDURE EVALUATION
Review of Systems/Medical History  Patient summary reviewed  Chart reviewed  No history of anesthetic complications     Cardiovascular  EKG reviewed, Exercise tolerance (METS): >4,  Hyperlipidemia, Hypertension controlled,    Pulmonary  Negative pulmonary ROS        GI/Hepatic      Comment: Pancreatic cyst     Negative  ROS        Endo/Other    Obesity  morbid obesity   GYN  Negative gynecology ROS          Hematology  Negative hematology ROS      Musculoskeletal  Negative musculoskeletal ROS        Neurology  Seizures well controlled,  CVA , residual symptoms,   Comment: Last seizure 2013    CVA 2006    Per Neuro visit with Dr Andres Campbell 10/2018  "CVA:   No new concerns  Lydia Paul is here today with her brother who states that overall she is stable no new concerns  -       She is on Plavix 75mg and aspirin 81mg once a day  -       Neurological deficit after the stroke: She has problem with depth perception and she is not able to see on the right  She is not able to contrast different colors  She is not able to process information that is not organized  Lydia Paul is also having balance issues " Psychology   Negative psychology ROS              Physical Exam    Airway    Mallampati score: III  TM Distance: >3 FB  Neck ROM: full     Dental   No notable dental hx     Cardiovascular  Rhythm: regular, Rate: normal, Cardiovascular exam normal    Pulmonary  Pulmonary exam normal Breath sounds clear to auscultation,     Other Findings        Anesthesia Plan  ASA Score- 3     Anesthesia Type- IV sedation with anesthesia with ASA Monitors  Additional Monitors:   Airway Plan:         Plan Factors-Patient not instructed to abstain from smoking on day of procedure  Patient did not smoke on day of surgery  Induction-     Postoperative Plan-     Informed Consent- Anesthetic plan and risks discussed with patient  I personally reviewed this patient with the CRNA  Discussed and agreed on the Anesthesia Plan with the CRNA  Carlo Pisano

## 2018-11-01 NOTE — ANESTHESIA POSTPROCEDURE EVALUATION
Post-Op Assessment Note      CV Status:  Stable    Mental Status:  Alert and awake    Hydration Status:  Euvolemic    PONV Controlled:  Controlled    Airway Patency:  Patent    Post Op Vitals Reviewed: Yes          Staff: CRNA           BP   123/58   Temp      Pulse  73   Resp   15   SpO2   95%

## 2018-11-01 NOTE — DISCHARGE INSTRUCTIONS
Transesophageal Echocardiogram   WHAT YOU NEED TO KNOW:   A transesophageal echocardiogram (ANSELMO) is a procedure used to check for problems with your heart  It will also show any problems in the blood vessels near your heart  Sound waves are sent to the heart through a tube inserted into your throat  The sound waves show the structure and function of your heart through pictures on a monitor  DISCHARGE INSTRUCTIONS:   Rest:  Rest until you are fully awake  You may be sleepy for a while after your procedure  Do not eat or drink until you are able to swallow normally:  Start with soft foods, such as oatmeal, yogurt, or applesauce  Once you can eat soft foods easily, you may begin to eat solid foods  Follow up with your healthcare provider as directed:  Write down your questions so you remember to ask them during your visits  Contact your healthcare provider if:   · You have a fever or chills  · You taste blood in your mouth  · You have a severe sore throat or difficulty swallowing  · You have questions or concerns about your condition or care  Seek care immediately or call 911 if:   · You have any of the following signs of a heart attack:      ¨ Squeezing, pressure, or pain in your chest that lasts longer than 5 minutes or returns    ¨ Discomfort or pain in your back, neck, jaw, stomach, or arm     ¨ Trouble breathing    ¨ Nausea or vomiting    ¨ Lightheadedness or a sudden cold sweat, especially with chest pain or trouble breathing    © 2017 [address] [place] [address] Street is for End User's use only and may not be sold, redistributed or otherwise used for commercial purposes  All illustrations and images included in CareNotes® are the copyrighted property of A D A M [employer] Inc  or Steve Waters  The above information is an  only  It is not intended as medical advice for individual conditions or treatments   Talk to your doctor, nurse or pharmacist before following any medical regimen to see if it is safe and effective for you  Anxiolysis in Adults   WHAT YOU NEED TO KNOW:   Anxiolysis is also called minimal sedation, conscious sedation, or twilight sedation  Anxiolysis is anxiety relief that occurs after you have been given medicine  This medicine helps you stay calm and comfortable during certain tests or procedures  It may be used before tests, such as an MRI, or a procedure, such as setting a broken arm  You may get the medicine as a pill or through your IV  If you are having surgery, the medicine will be given along with local or regional anesthesia  You may be drowsy, but you will be able to respond  DISCHARGE INSTRUCTIONS:   Return to the emergency department if:   · You are wheezing or having trouble breathing  · You have a rash that spreads over your body  Contact your healthcare provider if:   · You have pain or swelling where you got the injection  · You have nausea or are vomiting  · You have muscle spasms  · You are confused or sleepy for longer than 24 hours  · You have questions or concerns about your condition or care  After anxiolysis:   · Ask someone to stay with you for 24 hours  You may have trouble keeping your balance or doing daily activities  The person should also watch for any side effects from the medicine and call for help if needed  Examples include trouble breathing, hives, or an itchy rash over your body  · Rest and go slowly for at least 12 hours  You may have slow reflexes or be clumsy  Do not shower or take a bath until you feel fully awake  This can help prevent you from slipping in the bathtub  · Do not drive or make decisions for 24 hours  You may have trouble with coordination, reflexes, or thinking clearly  Ask someone to drive you if you need to go somewhere  Do not make important decisions until you are thinking clearly  · Eat and drink as directed  Limit the amount of fluid you drink   You may vomit if you drink too much fluid  You may need to start with clear fluids, such as broth or apple juice  If you do not vomit within 30 minutes, you may start to eat solid foods  Follow up with your healthcare provider as directed:  Write down your questions so you remember to ask them during your visits  © 2017 [address] Information is for End User's use only and may not be sold, redistributed or otherwise used for commercial purposes  All illustrations and images included in CareNotes® are the copyrighted property of [employer]  or Steve Waters  The above information is an  only  It is not intended as medical advice for individual conditions or treatments  Talk to your doctor, nurse or pharmacist before following any medical regimen to see if it is safe and effective for you

## 2018-11-02 ENCOUNTER — TELEPHONE (OUTPATIENT)
Dept: NON INVASIVE DIAGNOSTICS | Facility: HOSPITAL | Age: 68
End: 2018-11-02

## 2018-11-07 ENCOUNTER — HOSPITAL ENCOUNTER (OUTPATIENT)
Dept: CT IMAGING | Facility: HOSPITAL | Age: 68
Discharge: HOME | End: 2018-11-07
Attending: PSYCHIATRY & NEUROLOGY
Payer: MEDICARE

## 2018-11-07 DIAGNOSIS — I63.019 CEREBROVASCULAR ACCIDENT (CVA) DUE TO THROMBOSIS OF VERTEBRAL ARTERY, UNSPECIFIED BLOOD VESSEL LATERALITY (HCC): ICD-10-CM

## 2018-11-07 PROCEDURE — 70496 CT ANGIOGRAPHY HEAD: CPT

## 2018-11-07 PROCEDURE — 70498 CT ANGIOGRAPHY NECK: CPT

## 2018-11-07 RX ADMIN — IOHEXOL 85 ML: 350 INJECTION, SOLUTION INTRAVENOUS at 15:55

## 2018-11-14 ENCOUNTER — TRANSCRIBE ORDERS (OUTPATIENT)
Dept: LAB | Facility: CLINIC | Age: 68
End: 2018-11-14

## 2018-11-14 ENCOUNTER — APPOINTMENT (OUTPATIENT)
Dept: LAB | Facility: CLINIC | Age: 68
End: 2018-11-14
Payer: MEDICARE

## 2018-11-14 DIAGNOSIS — E53.8 LOW SERUM VITAMIN B12: ICD-10-CM

## 2018-11-14 DIAGNOSIS — I63.019 CEREBROVASCULAR ACCIDENT (CVA) DUE TO THROMBOSIS OF VERTEBRAL ARTERY, UNSPECIFIED BLOOD VESSEL LATERALITY (HCC): ICD-10-CM

## 2018-11-14 DIAGNOSIS — E55.9 VITAMIN D DEFICIENCY: ICD-10-CM

## 2018-11-14 DIAGNOSIS — I63.019 CEREBROVASCULAR ACCIDENT (CVA) DUE TO THROMBOSIS OF VERTEBRAL ARTERY, UNSPECIFIED BLOOD VESSEL LATERALITY (HCC): Primary | ICD-10-CM

## 2018-11-14 LAB
BUN SERPL-MCNC: 16 MG/DL (ref 5–25)
CREAT SERPL-MCNC: 0.98 MG/DL (ref 0.6–1.3)
GFR SERPL CREATININE-BSD FRML MDRD: 59 ML/MIN/1.73SQ M

## 2018-11-14 PROCEDURE — 82565 ASSAY OF CREATININE: CPT

## 2018-11-14 PROCEDURE — 36415 COLL VENOUS BLD VENIPUNCTURE: CPT

## 2018-11-14 PROCEDURE — 84520 ASSAY OF UREA NITROGEN: CPT

## 2018-12-04 ENCOUNTER — OFFICE VISIT (OUTPATIENT)
Dept: NEUROLOGY | Facility: CLINIC | Age: 68
End: 2018-12-04
Payer: MEDICARE

## 2018-12-04 ENCOUNTER — LAB (OUTPATIENT)
Dept: LAB | Facility: HOSPITAL | Age: 68
End: 2018-12-04
Payer: MEDICARE

## 2018-12-04 VITALS
SYSTOLIC BLOOD PRESSURE: 120 MMHG | WEIGHT: 220.2 LBS | DIASTOLIC BLOOD PRESSURE: 82 MMHG | HEIGHT: 67 IN | HEART RATE: 84 BPM | BODY MASS INDEX: 34.56 KG/M2

## 2018-12-04 DIAGNOSIS — I63.10 CEREBROVASCULAR ACCIDENT (CVA) DUE TO EMBOLISM OF PRECEREBRAL ARTERY (HCC): Primary | ICD-10-CM

## 2018-12-04 DIAGNOSIS — E53.8 LOW SERUM VITAMIN B12: ICD-10-CM

## 2018-12-04 DIAGNOSIS — R73.9 HYPERGLYCEMIA: ICD-10-CM

## 2018-12-04 DIAGNOSIS — E78.49 OTHER HYPERLIPIDEMIA: ICD-10-CM

## 2018-12-04 DIAGNOSIS — I63.019 CEREBROVASCULAR ACCIDENT (CVA) DUE TO THROMBOSIS OF VERTEBRAL ARTERY, UNSPECIFIED BLOOD VESSEL LATERALITY (HCC): ICD-10-CM

## 2018-12-04 DIAGNOSIS — I10 ESSENTIAL HYPERTENSION: ICD-10-CM

## 2018-12-04 DIAGNOSIS — I63.10 CEREBROVASCULAR ACCIDENT (CVA) DUE TO EMBOLISM OF PRECEREBRAL ARTERY (HCC): ICD-10-CM

## 2018-12-04 DIAGNOSIS — R56.9 SEIZURE (HCC): ICD-10-CM

## 2018-12-04 DIAGNOSIS — E55.9 VITAMIN D DEFICIENCY: ICD-10-CM

## 2018-12-04 DIAGNOSIS — G25.0 TREMOR, ESSENTIAL: ICD-10-CM

## 2018-12-04 LAB
25(OH)D3 SERPL-MCNC: 41.9 NG/ML (ref 30–100)
ALBUMIN SERPL BCP-MCNC: 3.7 G/DL (ref 3.5–5)
ALP SERPL-CCNC: 143 U/L (ref 46–116)
ALT SERPL W P-5'-P-CCNC: 32 U/L (ref 12–78)
ANION GAP SERPL CALCULATED.3IONS-SCNC: 7 MMOL/L (ref 4–13)
AST SERPL W P-5'-P-CCNC: 21 U/L (ref 5–45)
BILIRUB SERPL-MCNC: 0.41 MG/DL (ref 0.2–1)
BUN SERPL-MCNC: 15 MG/DL (ref 5–25)
CALCIUM SERPL-MCNC: 9.3 MG/DL (ref 8.3–10.1)
CHLORIDE SERPL-SCNC: 103 MMOL/L (ref 100–108)
CHOLEST SERPL-MCNC: 110 MG/DL (ref 50–200)
CO2 SERPL-SCNC: 28 MMOL/L (ref 21–32)
CREAT SERPL-MCNC: 1 MG/DL (ref 0.6–1.3)
CREAT UR-MCNC: 311 MG/DL
DEPRECATED AT III PPP: 100 % OF NORMAL (ref 92–136)
EST. AVERAGE GLUCOSE BLD GHB EST-MCNC: 151 MG/DL
GFR SERPL CREATININE-BSD FRML MDRD: 58 ML/MIN/1.73SQ M
GLUCOSE SERPL-MCNC: 119 MG/DL (ref 65–140)
HBA1C MFR BLD: 6.9 % (ref 4.2–6.3)
HDLC SERPL-MCNC: 40 MG/DL (ref 40–60)
LDLC SERPL CALC-MCNC: 40 MG/DL (ref 0–100)
PA ADP BLD-ACNC: 388 ARU
PA ADP BLD-ACNC: 92 PRU (ref 194–418)
POTASSIUM SERPL-SCNC: 3.5 MMOL/L (ref 3.5–5.3)
PROT SERPL-MCNC: 7.4 G/DL (ref 6.4–8.2)
PROT UR-MCNC: 16 MG/DL
PROT/CREAT UR: 0.05 MG/G{CREAT} (ref 0–0.1)
SODIUM SERPL-SCNC: 138 MMOL/L (ref 136–145)
TRIGL SERPL-MCNC: 150 MG/DL
TSH SERPL DL<=0.05 MIU/L-ACNC: 0.97 UIU/ML (ref 0.36–3.74)
VIT B12 SERPL-MCNC: 260 PG/ML (ref 100–900)

## 2018-12-04 PROCEDURE — 85670 THROMBIN TIME PLASMA: CPT

## 2018-12-04 PROCEDURE — 82306 VITAMIN D 25 HYDROXY: CPT

## 2018-12-04 PROCEDURE — 85705 THROMBOPLASTIN INHIBITION: CPT

## 2018-12-04 PROCEDURE — 86147 CARDIOLIPIN ANTIBODY EA IG: CPT

## 2018-12-04 PROCEDURE — 36415 COLL VENOUS BLD VENIPUNCTURE: CPT

## 2018-12-04 PROCEDURE — 81241 F5 GENE: CPT

## 2018-12-04 PROCEDURE — 85576 BLOOD PLATELET AGGREGATION: CPT

## 2018-12-04 PROCEDURE — 81240 F2 GENE: CPT

## 2018-12-04 PROCEDURE — 80053 COMPREHEN METABOLIC PANEL: CPT

## 2018-12-04 PROCEDURE — 85732 THROMBOPLASTIN TIME PARTIAL: CPT

## 2018-12-04 PROCEDURE — 86146 BETA-2 GLYCOPROTEIN ANTIBODY: CPT

## 2018-12-04 PROCEDURE — 85300 ANTITHROMBIN III ACTIVITY: CPT

## 2018-12-04 PROCEDURE — 84156 ASSAY OF PROTEIN URINE: CPT | Performed by: FAMILY MEDICINE

## 2018-12-04 PROCEDURE — 80061 LIPID PANEL: CPT

## 2018-12-04 PROCEDURE — 84443 ASSAY THYROID STIM HORMONE: CPT

## 2018-12-04 PROCEDURE — 85613 RUSSELL VIPER VENOM DILUTED: CPT

## 2018-12-04 PROCEDURE — 85306 CLOT INHIBIT PROT S FREE: CPT

## 2018-12-04 PROCEDURE — 82607 VITAMIN B-12: CPT

## 2018-12-04 PROCEDURE — 83036 HEMOGLOBIN GLYCOSYLATED A1C: CPT

## 2018-12-04 PROCEDURE — 85305 CLOT INHIBIT PROT S TOTAL: CPT

## 2018-12-04 PROCEDURE — 99214 OFFICE O/P EST MOD 30 MIN: CPT | Performed by: PSYCHIATRY & NEUROLOGY

## 2018-12-04 PROCEDURE — 82570 ASSAY OF URINE CREATININE: CPT | Performed by: FAMILY MEDICINE

## 2018-12-04 PROCEDURE — 85303 CLOT INHIBIT PROT C ACTIVITY: CPT

## 2018-12-04 RX ORDER — ACETAMINOPHEN 325 MG/1
325 TABLET ORAL EVERY 4 HOURS PRN
COMMUNITY
End: 2019-03-25 | Stop reason: ALTCHOICE

## 2018-12-04 NOTE — PATIENT INSTRUCTIONS
Stroke:  Estefania Singh presents with her family for follow-up in regard to her prior strokes  In particular she had significant posterior circulation strokes in 2013, and then had a recent MRI brain in May of 2018 which incidentally revealed 3 new areas of stroke in the bilateral cerebral hemispheres concerning for a possible embolic stroke  In the interval she has had a CT angiogram which did not reveal any clear intracranial vascular cause for her strokes, she also had a transesophageal echocardiogram which did not reveal any clear evidence of structural heart disease  She has not had any new stroke-like symptoms  She continues to reside in an assisted living setting as result does receive all of her medications   -for secondary stroke prevention for the time being she should continue her current combination of aspirin, Plavix, statin, and appropriate blood pressure control  -she has not recently had her hemoglobin A1c or cholesterol panel checked  I will request those to be done   -considering her relatively young age I would also suggest that a thrombosis panel is quite reasonable  I will request that to be performed along with a P2Y12 test an aspirin platelet inhibitor test to ensure that she is appropriately responding to the aspirin and Plavix that she has been taking   -I do think that she would benefit from heart rhythm monitoring to rule out atrial fibrillation  I have placed a referral for her to have an implantable loop monitor placed and monitored by the Cardiology electrophysiology team   -she should continue to exercise in her current assisted living setting  Seizure disorder:  She reports no breakthrough seizure events of any kind  We will plan to continue Keppra at her current dose  Essential tremor:  She continues to have a relatively significant head tremor in the office today    She reports that this is not overly bothersome to her and considering her current medication regimen I would not suggest adding an additional medicine for symptom control at this point in time however if the tremor becomes more bothersome to her it would be quite reasonable to consider treating in the future  Mild cognitive impairment:  She reports that she has had no significant changes in her degree of overall functional independence  She also has not had any changes in her cognition  I would strongly encourage her to remain socially active as much as possible  She should continue to do word searches in other mentally stimulating activities  She should also be wary in terms of her vision changes as result of her prior stroke, as this would make some more physically demanding activities more challenging without supervision  Particularly I would like her to select 1 or 2 activities that she finds most bothersome and work with her family to focus on practicing those activities over the course of a few months time in order to provide some symptomatic improvement  These goals should also be shared with the exercise staff  I will plan for her to return to the office in 4 months time but we would be happy to see her sooner if the need should arise  If she has any symptoms concerning for recurrent TIA or stroke such as sudden painless loss of vision or double vision that is new, difficulty speaking or swallowing, vertigo/room spinning that does not quickly resolve, or weakness/numbness affecting 1 side of the body she should proceed to the nearest emergency room immediately

## 2018-12-04 NOTE — PROGRESS NOTES
Patient ID: Adeline Herrera is a 76 y o  female  Assessment/Plan:    No problem-specific Assessment & Plan notes found for this encounter  Diagnoses and all orders for this visit:    Cerebrovascular accident (CVA) due to embolism of precerebral artery ([facility] )  -     Cardiac loop recorder implant; Future  -     Hemoglobin A1C; Future  -     Thrombosis Panel; Future  -     Lipid Panel with Direct LDL reflex; Future  -     P2Y12 Platelet inhibitor; Future  -     Aspirin Platelet Inhibitor; Future    Cerebrovascular accident (CVA) due to thrombosis of vertebral artery, unspecified blood vessel laterality ([facility] [address] )  -     Ambulatory referral to Neurology    Essential hypertension    Tremor, essential    Other hyperlipidemia  -     Lipid Panel with Direct LDL reflex; Future    Seizure ([facility] [address] )    Hyperglycemia   -     Hemoglobin A1C; Future    Other orders  -     acetaminophen (TYLENOL) 325 mg tablet; Take 325 mg by mouth every 4 (four) hours as needed for mild pain TAKE 2 TABLETS EVERY 4 HOURS PRN       Patient Instructions   Stroke:  [facility] presents with her family for follow-up in regard to her prior strokes  In particular she had significant posterior circulation strokes in 2013, and then had a recent MRI brain in May of 2018 which incidentally revealed 3 new areas of stroke in the bilateral cerebral hemispheres concerning for a possible embolic stroke  In the interval she has had a CT angiogram which did not reveal any clear intracranial vascular cause for her strokes, she also had a transesophageal echocardiogram which did not reveal any clear evidence of structural heart disease  She has not had any new stroke-like symptoms    She continues to reside in an assisted living setting as result does receive all of her medications   -for secondary stroke prevention for the time being she should continue her current combination of aspirin, Plavix, statin, and appropriate blood pressure control  -she has not recently had her hemoglobin A1c or cholesterol panel checked  I will request those to be done   -considering her relatively young age I would also suggest that a thrombosis panel is quite reasonable  I will request that to be performed along with a P2Y12 test an aspirin platelet inhibitor test to ensure that she is appropriately responding to the aspirin and Plavix that she has been taking   -I do think that she would benefit from heart rhythm monitoring to rule out atrial fibrillation  I have placed a referral for her to have an implantable loop monitor placed and monitored by the Cardiology electrophysiology team   -she should continue to exercise in her current assisted living setting  Seizure disorder:  She reports no breakthrough seizure events of any kind  We will plan to continue Keppra at her current dose  Essential tremor:  She continues to have a relatively significant head tremor in the office today  She reports that this is not overly bothersome to her and considering her current medication regimen I would not suggest adding an additional medicine for symptom control at this point in time however if the tremor becomes more bothersome to her it would be quite reasonable to consider treating in the future  Mild cognitive impairment:  She reports that she has had no significant changes in her degree of overall functional independence  She also has not had any changes in her cognition  I would strongly encourage her to remain socially active as much as possible  She should continue to do word searches in other mentally stimulating activities  She should also be wary in terms of her vision changes as result of her prior stroke, as this would make some more physically demanding activities more challenging without supervision    Particularly I would like her to select 1 or 2 activities that she finds most bothersome and work with her family to focus on practicing those activities over the course of a few months time in order to provide some symptomatic improvement  These goals should also be shared with the exercise staff  I will plan for her to return to the office in 4 months time but we would be happy to see her sooner if the need should arise  If she has any symptoms concerning for recurrent TIA or stroke such as sudden painless loss of vision or double vision that is new, difficulty speaking or swallowing, vertigo/room spinning that does not quickly resolve, or weakness/numbness affecting 1 side of the body she should proceed to the nearest emergency room immediately  Subjective:    GAURAV Mera presents for follow-up evaluation for multiple neurologic issues    Seizure disorder:  She reports no new events concerning for recurrent TIA or seizure  She takes all her medication as prescribed as this is given to her through her cyst living facility  She denies any passing out spells  Essential tremor:  She continues to have a significant tremor in the office today however she reports that this is not overly bothersome to her  Mild cognitive impairment:  She reports ongoing frequent exercise in that there been no changes in her activities of daily living or independence  She continues to reside in an assisted living setting  She does word searches and is quite good them however she has a lot of difficulty with reading when use and reportedly issues with depth perception  This is likely related to her strokes  Stroke: In May of 2018 she had an MRI brain with neuroquandt imaging which incidentally revealed 3 deep white matter ischemic strokes on the diffusion-weighted imaging  This was likely incidental, but would potentially represents an embolic stroke of undetermined source  I did personally review her diffusion-weighted imaging    She additionally does have a history of significant posterior circulation strokes which were likely felt to be related to the for stenosis and some vertebral artery thrombus  She has had a repeat CT angiogram which reveals basically resolution of the thrombus although 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, but no significant extracranial or intracranial stenosis that would explain her current stroke events  She had a transesophageal echocardiogram which did not reveal any significant structural heart disease  It has been recommended in the recent past that she have a hemoglobin A1c (she does have a history of hyperglycemia), cholesterol panel, and I would also suggest P2Y12, aspirin platelet inhibitor, and thrombosis panel testing  I have also suggested to her family that they should have these labs drawn at [facility] prior to their return to the assisted living facility which they are going to do  Objective:    Blood pressure 120/82, pulse 84, height 5' 7" (1 702 m), weight 99 9 kg (220 lb 3 2 oz)  Physical Exam    Neurological Exam    At the time of my evaluation Sabas Knight was awake, alert, and brightly responses  She was able to climb up onto the exam table without difficulty  She had a significant no-no head tremor which did fluctuate in intensity throughout the course of our discussion together  Cranial nerves 2-12 were otherwise symmetrically intact with the exception of her vision which was significantly diminished in the by temporal and inferior nasal fields bilaterally  Motor testing dot was 5/5 in the bilateral upper and lower extremities  Sensation was intact to light touch in the bilateral upper and lower extremities  ROS:    Review of Systems   Constitutional: Negative  Negative for appetite change and fever  HENT: Negative  Negative for hearing loss, tinnitus, trouble swallowing and voice change  Eyes: Positive for visual disturbance (bumps into things)  Negative for photophobia and pain  Respiratory: Negative  Negative for shortness of breath  Cardiovascular: Negative  Negative for palpitations  Gastrointestinal: Negative  Negative for nausea and vomiting  Endocrine: Negative  Negative for cold intolerance and heat intolerance  Genitourinary: Negative  Negative for dysuria, frequency and urgency  Musculoskeletal: Positive for arthralgias (knees and hips) and back pain  Negative for myalgias and neck pain  Skin: Negative  Negative for rash  Neurological: Positive for tremors  Negative for dizziness, seizures, syncope, facial asymmetry, speech difficulty, weakness, light-headedness, numbness and headaches  Hematological: Negative  Does not bruise/bleed easily  Psychiatric/Behavioral: Negative  Negative for confusion, hallucinations and sleep disturbance

## 2018-12-06 LAB
APTT SCREEN TO CONFIRM RATIO: 0.95 RATIO (ref 0–1.4)
B2 GLYCOPROT1 IGA SER-ACNC: <9 GPI IGA UNITS (ref 0–25)
B2 GLYCOPROT1 IGG SER-ACNC: <9 GPI IGG UNITS (ref 0–20)
B2 GLYCOPROT1 IGM SER-ACNC: <9 GPI IGM UNITS (ref 0–32)
CARDIOLIPIN IGA SER IA-ACNC: <9 APL U/ML (ref 0–11)
CARDIOLIPIN IGG SER IA-ACNC: <9 GPL U/ML (ref 0–14)
CARDIOLIPIN IGM SER IA-ACNC: <9 MPL U/ML (ref 0–12)
CONFIRM APTT/NORMAL: 42.8 SEC (ref 0–55)
LA PPP-IMP: NORMAL
PROT C AG ACT/NOR PPP IA: 109 % OF NORMAL (ref 60–150)
PROT S ACT/NOR PPP: 77 % (ref 57–157)
PROT S ACT/NOR PPP: 81 % (ref 63–140)
PROT S PPP-ACNC: 97 % (ref 60–150)
SCREEN APTT: 30.3 SEC (ref 0–51.9)
SCREEN DRVVT: 35.6 SEC (ref 0–47)
THROMBIN TIME: 15.7 SEC (ref 0–23)

## 2018-12-10 ENCOUNTER — OFFICE VISIT (OUTPATIENT)
Dept: FAMILY MEDICINE CLINIC | Facility: CLINIC | Age: 68
End: 2018-12-10
Payer: MEDICARE

## 2018-12-10 VITALS
TEMPERATURE: 98.3 F | RESPIRATION RATE: 16 BRPM | BODY MASS INDEX: 34.56 KG/M2 | HEART RATE: 84 BPM | HEIGHT: 67 IN | WEIGHT: 220.2 LBS | DIASTOLIC BLOOD PRESSURE: 80 MMHG | SYSTOLIC BLOOD PRESSURE: 136 MMHG

## 2018-12-10 DIAGNOSIS — G31.84 MILD COGNITIVE IMPAIRMENT: ICD-10-CM

## 2018-12-10 DIAGNOSIS — I63.019 CEREBROVASCULAR ACCIDENT (CVA) DUE TO THROMBOSIS OF VERTEBRAL ARTERY, UNSPECIFIED BLOOD VESSEL LATERALITY (HCC): ICD-10-CM

## 2018-12-10 DIAGNOSIS — E78.49 OTHER HYPERLIPIDEMIA: ICD-10-CM

## 2018-12-10 DIAGNOSIS — I10 ESSENTIAL HYPERTENSION: ICD-10-CM

## 2018-12-10 DIAGNOSIS — R56.9 SEIZURE (HCC): ICD-10-CM

## 2018-12-10 DIAGNOSIS — E11.9 TYPE 2 DIABETES MELLITUS WITHOUT COMPLICATION, WITHOUT LONG-TERM CURRENT USE OF INSULIN (HCC): Primary | ICD-10-CM

## 2018-12-10 DIAGNOSIS — E55.9 VITAMIN D DEFICIENCY: ICD-10-CM

## 2018-12-10 PROCEDURE — 99213 OFFICE O/P EST LOW 20 MIN: CPT | Performed by: FAMILY MEDICINE

## 2018-12-10 RX ORDER — MELATONIN
1000 DAILY
Qty: 30 TABLET | Refills: 5 | Status: SHIPPED | OUTPATIENT
Start: 2018-12-10 | End: 2019-09-12 | Stop reason: SDUPTHER

## 2018-12-10 NOTE — ASSESSMENT & PLAN NOTE
Lab Results   Component Value Date    HGBA1C 6 9 (H) 12/04/2018     - New diagnosis with A1c 6 9  - Diet and exercise discussed   - Will start Metformin 500 mg daily   - Repeat A1c in 3 months

## 2018-12-10 NOTE — ASSESSMENT & PLAN NOTE
- Followed closely with neurology and was recently seen on 12/4 by Dr Nirmal Swain  - Per neurology note "posterior circulation strokes in 2013, and then had a recent MRI brain in May of 2018 which incidentally revealed 3 new areas of stroke in the bilateral cerebral hemispheres concerning for a possible embolic stroke "  - Plan for continue aspirin 81 mg, Plavix 75 mg and implant of cardiac loop recorder to investigate for Afib as possible cause of embolic stroke

## 2018-12-10 NOTE — ASSESSMENT & PLAN NOTE
- Completed Vitamin D 50,000 units x8 weeks   - Repeat Vitamin D 41 9   - Continue maintenance 1,000 units daily

## 2018-12-10 NOTE — PROGRESS NOTES
Soni Thomason 1950 female MRN: [id number]    Family Medicine Follow-up Visit    ASSESSMENT/PLAN  Problem List Items Addressed This Visit        Endocrine    Type 2 diabetes mellitus without complication, without long-term current use of insulin ([facility] )     Lab Results   Component Value Date    HGBA1C 6 9 (H) 12/04/2018     - New diagnosis with A1c 6 9  - Diet and exercise discussed   - Will start Metformin 500 mg daily   - Repeat A1c in 3 months          Relevant Medications    metFORMIN (GLUCOPHAGE) 500 mg tablet    Other Relevant Orders    Hemoglobin A1C       Cardiovascular and Mediastinum    CVA (cerebral vascular accident) ([facility] )     - Followed closely with neurology and was recently seen on 12/4 by Dr Brayden Matos  - Per neurology note "posterior circulation strokes in 2013, and then had a recent MRI brain in May of 2018 which incidentally revealed 3 new areas of stroke in the bilateral cerebral hemispheres concerning for a possible embolic stroke "  - Plan for continue aspirin 81 mg, Plavix 75 mg and implant of cardiac loop recorder to investigate for Afib as possible cause of embolic stroke           Essential hypertension - Primary     - Well controlled, /80 today   - Continue hydrochlorothiazide 12 5 mg daily             Nervous and Auditory    Mild cognitive impairment     - No concerns, unchanged from prior visit   - Continue Donepezil 10 mg daily             Other    Seizure ([facility] [address] )     - Followed closely by neurology, no recent seizure activity   - Continue Keppra 750 mg BID          HLD (hyperlipidemia)     - Lipid panel UTD  - Continue Lipitor 80 mg daily          Vitamin D deficiency     - Completed Vitamin D 50,000 units x8 weeks   - Repeat Vitamin D 41 9   - Continue maintenance 1,000 units daily          Relevant Medications    cholecalciferol (VITAMIN D3) 1,000 units tablet          Follow-up in 3 months, sooner as needed  Annual medicare wellness visit scheduled       Future Appointments  Date Time Provider Joslyn Poole   3/25/2019 1:00 PM Fany Delgado, DO [facility]   10/24/2019 3:15 PM Juliann Agustin PA-C NE[facility]          SUBJECTIVE  CC: Follow-up      HPI:  Monica Calloway is a 76 y o  female with past medical history significant for CVA and seizures followed by neurology, hypertension, hyperlipidemia and MCI who presents with her family member for review of chronic conditions  The patient has no acute complaints or concerns today, she states that she has been doing very well overall  Her family member also has no concerns, states that they recently saw the neurologist and he was very thorough in explaining the ongoing workup to see why she continues to have strokes  The family member says that the patient was not aware during these new strokes and was completely asymptomatic  Her chronic deficits include visual disturbance with difficulty with depth perception  She currently lives in an assisted living facility and is doing well, although comments that she does not like the food  Review of Systems   Constitutional: Negative for activity change, appetite change, chills and fever  Eyes: Positive for visual disturbance  Respiratory: Negative for cough, chest tightness, shortness of breath and wheezing  Cardiovascular: Negative for chest pain, palpitations and leg swelling  Gastrointestinal: Negative for abdominal pain, constipation, diarrhea, nausea and vomiting  Genitourinary: Negative for difficulty urinating  Skin: Negative for pallor and rash  Neurological: Positive for tremors (No-no tremor chronic)  Negative for dizziness, weakness, light-headedness, numbness and headaches  Psychiatric/Behavioral: Negative for behavioral problems         Historical Information   The patient history was reviewed as follows:    Past Medical History:   Diagnosis Date    Diverticulosis     Hypertension     Morbid obesity ([address] )     Pancreatic cyst     Seizures ([facility] [address] )     last seizure 2013    Stroke [facility])     Type 2 diabetes mellitus without complication, without long-term current use of insulin ([facility] [address] ) 12/10/2018    Wears glasses      Past Surgical History:   Procedure Laterality Date    APPENDECTOMY      COLONOSCOPY      complete    ID COLONOSCOPY FLX DX W/COLLJ SPEC WHEN PFRMD N/A 3/6/2018    Procedure: COLONOSCOPY with polypectomy;  Surgeon: Lashay Pike MD;  Location: [facility]; Service: General     Family History   Problem Relation Age of Onset    Stroke Father     Lung cancer Maternal Grandfather     Ovarian cancer Mother         Left ovary     Lung cancer Paternal Grandfather     Other Other         MSH6-related Powers syndrome     Stroke Family       Social History   History   Alcohol Use No     History   Drug Use No     History   Smoking Status    Never Smoker   Smokeless Tobacco    Never Used       Medications:     Current Outpatient Prescriptions:     acetaminophen (TYLENOL) 325 mg tablet, Take 325 mg by mouth every 4 (four) hours as needed for mild pain TAKE 2 TABLETS EVERY 4 HOURS PRN, Disp: , Rfl:     aspirin 81 mg chewable tablet, Chew 1 tablet daily, Disp: , Rfl:     atorvastatin (LIPITOR) 80 mg tablet, Take by mouth, Disp: , Rfl:     clopidogrel (PLAVIX) 75 mg tablet, Take 1 tablet (75 mg total) by mouth daily Wait 48 hours before starting next dose , Disp: 1 tablet, Rfl: 0    cyclobenzaprine (FLEXERIL) 5 mg tablet, Take 1 tablet by mouth, Disp: , Rfl:     donepezil (ARICEPT) 10 mg tablet, 1 tab daily, Disp: 30 tablet, Rfl: 0    hydrochlorothiazide (MICROZIDE) 12 5 mg capsule, , Disp: , Rfl:     levETIRAcetam (KEPPRA) 750 mg tablet, Take by mouth 2 (two) times a day  , Disp: , Rfl:     cholecalciferol (VITAMIN D3) 1,000 units tablet, Take 1 tablet (1,000 Units total) by mouth daily, Disp: 30 tablet, Rfl: 5    Cyanocobalamin 1000 MCG/ML KIT, INJECT 1ML ONCE A WEEK FOR 8 WEEKS  IM INTO DELTOID   (Patient not taking: Reported on 10/23/2018 ), Disp: 1 mL, Rfl: 0    ergocalciferol (VITAMIN D2) 50,000 units, Take 1 capsule (50,000 Units total) by mouth once a week for 8 doses (Patient not taking: Reported on 12/4/2018 ), Disp: 8 capsule, Rfl: 0    hydrochlorothiazide (HYDRODIURIL) 25 mg tablet, Take 0 5 tablets (12 5 mg total) by mouth daily (Patient not taking: Reported on 12/4/2018 ), Disp: 30 tablet, Rfl: 2    metFORMIN (GLUCOPHAGE) 500 mg tablet, Take 1 tablet (500 mg total) by mouth daily with breakfast, Disp: 90 tablet, Rfl: 0    oxyCODONE-acetaminophen (PERCOCET) 5-325 mg per tablet, Take 1 tablet by mouth every 6 (six) hours as needed for moderate pain Max Daily Amount: 4 tablets (Patient not taking: Reported on 9/24/2018 ), Disp: 12 tablet, Rfl: 0    vitamin B-12 (CYANOCOBALAMIN) 500 MCG TABS, Two tabs daily (Patient not taking: Reported on 10/23/2018 ), Disp: 60 tablet, Rfl: 11  No Known Allergies    OBJECTIVE    Vitals:   Vitals:    12/10/18 1353   BP: 136/80   Pulse: 84   Resp: 16   Temp: 98 3 °F (36 8 °C)   Weight: 99 9 kg (220 lb 3 2 oz)   Height: 5' 6 5" (1 689 m)           Physical Exam   Constitutional: She appears well-developed and well-nourished  No distress  Neck: Normal range of motion  Neck supple  Cardiovascular: Normal rate, regular rhythm, normal heart sounds and intact distal pulses  No murmur heard  Pulmonary/Chest: Effort normal and breath sounds normal  No respiratory distress  She has no wheezes  She has no rales  Abdominal: Soft  Bowel sounds are normal  She exhibits no distension  There is no tenderness  There is no rebound and no guarding  Musculoskeletal: She exhibits no edema  Neurological: She is alert  She exhibits normal muscle tone  Skin: Skin is warm  No rash noted  She is not diaphoretic  Psychiatric: She has a normal mood and affect  Her behavior is normal  Thought content normal    Vitals reviewed           Hira Delgado DO, PGY-2  [facility]   12/10/2018

## 2018-12-10 NOTE — PATIENT INSTRUCTIONS
Nutrition: How to Make Justino Sanchez6  A healthy diet has a lot of benefits  It can prevent certain health conditions like heart disease and cancer, and it can lower your cholesterol  It can give you more energy, help you focus, and improve your mood  It can also help you lose weight or stay at a healthy weight  Path to Improved Health  The choices you make about what you eat and drink matter  They should add up to a balanced, nutritious diet  We all have different calorie needs based on our age, sex, and activity level  Health conditions can have a role, too  Fruits and Vegetables  Fruits and vegetables are rich in fiber, vitamins, and minerals  They should be the basis of your diet  Try to get many different colors of fruits and vegetables each day to add flavor and variety  Fruits and vegetables should cover half of your plate at each meal  Try not to add saturated fats and sugar to vegetables and fruits  This means avoiding margarine, butter, mayonnaise, and sour cream  You can use yogurt, healthy oils (such as canola or olive oil), or herbs instead  Potatoes and corn are not considered vegetables  Your body processes them more like grains  FRUITS & VEGETABLES   INSTEAD OF THIS: TRY THIS:   Regular or fried vegetables served with cream, cheese, or butter Raw, steamed, boiled, sautéed, or baked vegetables tossed with olive oil, salt, and pepper, or with onions or spices added (like garlic and cumin)   Fruits served with cream cheese or sugary sauces Fresh fruit with peanut, almond, or cashew butter or plain yogurt   Fried potatoes, including french fries, hash browns, and potato chips Baked sweet potatoes or other vegetables     Grains  Choose products that list whole grains as the first ingredient  Whole grains are high in fiber, protein, and vitamins  They are digested slowly, which helps you feel full longer and keeps you from overeating  Avoid products that say enriched    Hot cereals like oatmeal are usually low in saturated fat  However, instant cereals with cream may contain processed oils and can be high in sugar  Granola cereals usually contain a lot of sugar  Cold cereals are generally made with refined grains and are high in sugars  Look for whole-grain, low-sugar options instead  Try not to eat rich sweets, such as doughnuts, rolls, and muffins  Consider fruit or a piece of dark chocolate instead to satisfy your sweet tooth  GRAINS   INSTEAD OF THIS: TRY THIS:   Croissants, rolls, biscuits, and white breads Whole-grain breads, including wheat, rye, and pumpernickel   Doughnuts, pastries, and scones Whole-grain English muffins and small whole-grain bagels   Fried tortillas Soft tortillas (corn or whole wheat) without trans fats   Sugary cereals and regular granola Whole-grain cereal, oatmeal, and reduced-sugar granola   Snack crackers Whole-grain crackers   Potato or corn chips and buttered popcorn Unbuttered popcorn   White pasta Whole-wheat pasta   White rice Brown or wild rice   Fried rice or pasta mixes Brown rice or whole-grain pasta with low-sodium vegetable sauce   All-purpose white flour Whole-wheat flour     Protein  Protein can come from animal and vegetable sources  People who get more of their protein from animal sources tend to have more health problems that can lead to illness and early death  It is healthier to eat meat less often and get most of your protein from plant sources  When you eat meat, choose leaner cuts  Vegetable Protein Sources  There are many ways to get protein in your diet even if you do not eat meat  Most vegetables have some protein  When you eat these vegetables with whole grains, seeds, nuts, and especially beans, you can get a good amount of protein  You can swap beans for meat in recipes like lasagna or chili  Soy foods such as tofu, tempeh, and edamame are also good sources of protein      Beef, Pork, Veal, and [employer] and veal cuts have the words loin or round in their names  Lean pork cuts have the words loin or leg in their names  Trim off the outside fat before cooking the meat  Trim any inside fat before eating it  Use herbs, spices, and low-sodium marinades to season meat  Baking, broiling, grilling, and roasting are the healthiest ways to cook meats  Lean cuts can be panbroiled or stir-fried  Use a nonstick pan, canola oil, or olive oil instead of butter or margarine  Don't serve meat with high-fat sauces and gravies  Poultry  Chicken breasts are a good choice because they are low in fat and high in protein  Only eat duck and goose once in a while, because they are higher in saturated fat  Remove skin and visible fat before cooking  Baking, broiling, grilling, and roasting are the healthiest ways to [employer]  Skinless poultry can be pan broiled or stir fried  Use a nonstick pan, canola oil, or olive oil instead of butter or margarine  Seafood  Most seafood is high in healthy polyunsaturated fats  Healthy omega-3 fatty acids also are found in some fish, such as salmon and cold-water trout  If good-quality fresh fish isn't available, buy frozen fish  To prepare fish, you should poach, steam, bake, broil, or grill it      PROTEIN   INSTEAD OF THIS: TRY THIS:   Prime and marbled cuts of meat Select-grade lean beef, such as round, sirloin, and loin cuts   Pork spare ribs and granados Lean pork, such as tenderloin and loin chop, turkey granados, tofu granados   Regular ground beef Lean or extra-lean ground beef, ground chicken or turkey, tempeh, or beans   Lunch meats, such as pepperoni, salami, bologna, and liverwurst Lean lunch meats, such as turkey, chicken, and ham   Regular hot dogs and sausage Fat-free hot dogs, turkey dogs, tofu hot dogs   Breaded fish sticks and cakes, fish canned in oil, or seafood prepared with butter or served with high-fat sauce Fish (fresh, frozen, or canned in water), grilled fish sticks and cakes, or shellfish     Dairy  Choose low-fat, skim, or nondairy milk, such as soy, rice, or almond milk  Try low-fat or part-skim cheeses and other dairy products, or choose smaller portions of foods high in saturated fat  Yogurt can replace sour cream in many recipes  It is important to pick yogurt without added sugar  Try mixing yogurt with fruit for dessert  Sorbet and frozen yogurt are lower in fat than ice cream     DAIRY   INSTEAD OF THIS: TRY THIS:   Whole milk Skim (nonfat), 1% or 2% (low fat), or nondairy milk, such as soy, rice, almond, or cashew milk   Cream or evaporated milk Evaporated skim milk   Regular buttermilk Low-fat buttermilk   Yogurt made with whole milk Low-fat or nonfat yogurt   Regular cheese, including American, blue, Brie, cheddar, Yuriy, and Parmesan Low-fat cheese with less than 3 g fat per serving, or nondairy soy cheese   Regular cottage cheese Low-fat cottage cheese (less than 2% fat)   Regular cream cheese Low-fat cream cheese with less than 3 g fat per 1 oz, or skim ricotta   Ice cream Sorbet, sherbet, or frozen yogurt with less than 3 g fat per ½-cup serving     Fats and Oils  Although high-fat foods are higher in calories, they can help you feel satisfied with eating less  Don't be afraid to have fats in your diet, but try to limit saturated and trans fats  You need saturated and unsaturated fats in your diet, but most Americans get too much saturated fat  Heart disease, diabetes, some cancers, and arthritis have been linked to diets high in saturated fat, particularly saturated fats from animal products  FATS & OILS   INSTEAD OF THIS: TRY THIS:   Cookies Fruit or whole-grain cookies   Shortening, butter, and margarine Olive, canola, and soybean oils   Regular mayonnaise Yogurt   Regular salad dressing Vinaigrette with olive oil and vinegar   Butter or fat to grease pans Nonstick cooking spray, olive oil, or canola oil     Beverages  It is important that you stay hydrated   However, drinks that contain sugar are not healthy  This includes fruit juices, soda, sports and energy drinks, sweetened or flavored milk, and sweet tea  Artificial sweeteners may also be bad for your health  Drink mostly water or other unsweetened drinks  Don't drink too much alcohol  Women should have no more than one drink per day  Men should have no more than two drinks per day  More information  American [employer] Patient Information Resource 10% - bad control"> 10% - bad control,Hemoglobin A1c (HbA1c) greater than 10% indicating poor diabetic control,Haemoglobin A1c greater than 10% indicating poor diabetic control">   Diabetes Mellitus Type 2 in Adults, Ambulatory Care   GENERAL INFORMATION:   Diabetes mellitus type 2  is a disease that affects how your body uses glucose (sugar)  Insulin helps move sugar out of the blood so it can be used for energy  Normally, when the blood sugar level increases, the pancreas makes more insulin  Type 2 diabetes develops because either the body cannot make enough insulin, or it cannot use the insulin correctly  After many years, your pancreas may stop making insulin  Common symptoms include the following:   · More hunger or thirst than usual     · Frequent urination     · Weight loss without trying     · Blurred vision  Seek immediate care for the following symptoms:   · Severe abdominal pain, or pain that spreads to your back  You may also be vomiting  · Trouble staying awake or focusing    · Shaking or sweating    · Blurred or double vision    · Breath has a fruity, sweet smell    · Breathing is deep and labored, or rapid and shallow    · Heartbeat is fast and weak  Treatment for diabetes mellitus type 2  includes keeping your blood sugar at a normal level  You must eat the right foods, and exercise regularly  You may also need medicine if you cannot control your blood sugar level with nutrition and exercise    Manage diabetes mellitus type 2:   · Check your blood sugar level  You will be taught how to check a small drop of blood in a glucose monitor  Ask your healthcare provider when and how often to check during the day  Ask your healthcare provider what your blood sugar levels should be when you check them  · Keep track of carbohydrates (sugar and starchy foods)  Your blood sugar level can get too high if you eat too many carbohydrates  Your dietitian will help you plan meals and snacks that have the right amount of carbohydrates  · Eat low-fat foods  Some examples are skinless chicken and low-fat milk  · Eat less sodium (salt)  Some examples of high-sodium foods to limit are soy sauce, potato chips, and soup  Do not add salt to food you cook  Limit your use of table salt  · Eat high-fiber foods  Foods that are a good source of fiber include vegetables, whole grain bread, and beans  · Limit alcohol  Alcohol affects your blood sugar level and can make it harder to manage your diabetes  Women should limit alcohol to 1 drink a day  Men should limit alcohol to 2 drinks a day  A drink of alcohol is 12 ounces of beer, 5 ounces of wine, or 1½ ounces of liquor  · Get regular exercise  Exercise can help keep your blood sugar level steady, decrease your risk of heart disease, and help you lose weight  Exercise for at least 30 minutes, 5 days a week  Include muscle strengthening activities 2 days each week  Work with your healthcare provider to create an exercise plan  · Check your feet each day  for injuries or open sores  Ask your healthcare provider for activities you can do if you have an open sore  · Quit smoking  If you smoke, it is never too late to quit  Smoking can worsen the problems that may occur with diabetes  Ask your healthcare provider for information about how to stop smoking if you are having trouble quitting  · Ask about your weight:  Ask healthcare providers if you need to lose weight, and how much to lose   Ask them to help you with a weight loss program  Even a 10 to 15 pound weight loss can help you manage your blood sugar level  · Carry medical alert identification  Wear medical alert jewelry or carry a card that says you have diabetes  Ask your healthcare provider where to get these items  · Ask about vaccines  Diabetes puts you at risk of serious illness if you get the flu, pneumonia, or hepatitis  Ask your healthcare provider if you should get a flu, pneumonia, or hepatitis B vaccine, and when to get the vaccine  Follow up with your healthcare provider as directed:  Write down your questions so you remember to ask them during your visits  CARE AGREEMENT:   You have the right to help plan your care  Learn about your health condition and how it may be treated  Discuss treatment options with your caregivers to decide what care you want to receive  You always have the right to refuse treatment  The above information is an  only  It is not intended as medical advice for individual conditions or treatments  Talk to your doctor, nurse or pharmacist before following any medical regimen to see if it is safe and effective for you  © 2014 [address] is for End User's use only and may not be sold, redistributed or otherwise used for commercial purposes  All illustrations and images included in CareNotes® are the copyrighted property of A D A M , Inc  or Steve Waters  https://familydoctor  org/dietary-fats-whats-good-and-whats-bad/ and https://familydoctor  org/nutrition-tips-for-improving-your-health/  [employer] Eating Plate   [employer] se  [place]   [employer], Choose My Plate   [employer] br

## 2018-12-11 LAB
F2 GENE MUT ANL BLD/T: NORMAL
F5 GENE MUT ANL BLD/T: NORMAL

## 2018-12-14 ENCOUNTER — TELEPHONE (OUTPATIENT)
Dept: FAMILY MEDICINE CLINIC | Facility: CLINIC | Age: 68
End: 2018-12-14

## 2018-12-14 NOTE — TELEPHONE ENCOUNTER
Patient resides in assisted living  Spoke with sister who is POA, she had questions about recent change in blood sugar  Reviewed recent results, physician note and recommendations  Told her AIC ordered can be completed here in office at visit

## 2018-12-18 ENCOUNTER — TELEPHONE (OUTPATIENT)
Dept: FAMILY MEDICINE CLINIC | Facility: CLINIC | Age: 68
End: 2018-12-18

## 2018-12-18 NOTE — TELEPHONE ENCOUNTER
[facility],  From [facility],  Calling on behalf of patient and a newly diagnose of Diabetes   Patient in need of a kit pertaining to following supplies:        Glucometer    Test Strips    Lancets    Alcohol swabs   Electronically to [address] [place] [address] (o)

## 2018-12-18 NOTE — TELEPHONE ENCOUNTER
Patient does not need to check her blood sugar at home, she is on low dose metformin only  Plan for diabetes at our last visit was metformin 500 mg daily, work on diet and exercise and repeat A1c in 3 months  Let me know if she has any questions/concerns- thanks!

## 2019-01-04 ENCOUNTER — TELEPHONE (OUTPATIENT)
Dept: FAMILY MEDICINE CLINIC | Facility: CLINIC | Age: 69
End: 2019-01-04

## 2019-01-04 NOTE — TELEPHONE ENCOUNTER
Documentation of Medical Evaluation form completed and faxed to [facility]  Thanks!

## 2019-01-15 ENCOUNTER — TELEPHONE (OUTPATIENT)
Dept: INPATIENT UNIT | Facility: HOSPITAL | Age: 69
End: 2019-01-15

## 2019-01-16 ENCOUNTER — HOSPITAL ENCOUNTER (OUTPATIENT)
Dept: NON INVASIVE DIAGNOSTICS | Facility: HOSPITAL | Age: 69
Discharge: HOME | End: 2019-01-16
Attending: INTERNAL MEDICINE | Admitting: INTERNAL MEDICINE
Payer: MEDICARE

## 2019-01-16 VITALS
OXYGEN SATURATION: 95 % | DIASTOLIC BLOOD PRESSURE: 67 MMHG | HEART RATE: 73 BPM | TEMPERATURE: 98 F | RESPIRATION RATE: 18 BRPM | SYSTOLIC BLOOD PRESSURE: 149 MMHG

## 2019-01-16 DIAGNOSIS — I63.10 CEREBROVASCULAR ACCIDENT (CVA) DUE TO EMBOLISM OF PRECEREBRAL ARTERY (HCC): ICD-10-CM

## 2019-01-16 PROCEDURE — C1764 EVENT RECORDER, CARDIAC: HCPCS

## 2019-01-16 PROCEDURE — 33285 INSJ SUBQ CAR RHYTHM MNTR: CPT

## 2019-01-16 RX ORDER — LIDOCAINE HYDROCHLORIDE AND EPINEPHRINE 10; 10 MG/ML; UG/ML
INJECTION, SOLUTION INFILTRATION; PERINEURAL CODE/TRAUMA/SEDATION MEDICATION
Status: COMPLETED | OUTPATIENT
Start: 2019-01-16 | End: 2019-01-16

## 2019-01-16 RX ADMIN — LIDOCAINE HYDROCHLORIDE AND EPINEPHRINE 15 ML: 10; 10 INJECTION, SOLUTION INFILTRATION; PERINEURAL at 07:42

## 2019-01-16 NOTE — PROCEDURES
Placement of cardiac event recorder    History and physical were reviewed  Patient was examined and history was reviewed  No change in patient's condition Since history and physical has been completed        The pre- operative diagnosis:   1  Cryptogenic CVA        Postoperative diagnosis:  1  Cryptogenic CVA s/p ILR         Procedure: placement of cardiac event recorder          Surgeon: Vince Bonds PA-C    Assistants -none    Specimens - none    Estimated blood loss- none    Findings-none    Complications none    Anesthesia-  local lidocaine by myself      Details of the device [employer] Reveal Linq        Description of procedure: The patient was seen before the procedure  The details of the procedure was explained and patient agreed to the same  Appropriate consent was signed  The patient was brought to the short stay surgical center [place] time out was done  Sterile dressing and draping was done  Local lidocaine was infiltrated, in the left third intercostal space, about 2 cm lateral to the sternal edge  Using the stab knife an incision was made  Thereafter the  was used, moved around to form a pocket, along the long axis of the heart, in the fourth intercostal space region  Then plunger was used to deploy the device  The plunger was disengaged and removed  The  was pulled back  Pressure was held and hemostasis was obtained  Pocket closed with interrupted 4-0 ethilon    Dressing was done Telfa and Tegaderm      Summary of the procedure: The patient came in to the laboratory for cardiac event recorder placement   The device has been placed and patient tolerated the procedure well

## 2019-01-16 NOTE — H&P
H&P Exam - Cardiology   Tammy Corona 76 y o  female MRN: [id number]  Unit/Bed#: [facility] 04-01 Encounter: [id number]    Assessment/Plan     Assessment:  1  Cerebrovascular accident due to embolism of precerebral artery    - posterior circulation stroke in 2013   - MRI of the brain 2018 showed 3 new area of stroke that are concerning for possible embolic stroke   - currently maintained on aspirin, Plavix, and aspirin  2  Essential hypertension  3  Seizure disorder  4  Essential tremor  5  Mild cognitive impairment    Plan:  She presents today for implantable loop recorder implantation  History of Present Illness   HPI:  Tammy Corona is a 76y o  year old female with a history of CVA, essential hypertension, seizure disorder, essential tremor, and mild cognitive impairment  She follows with Dr Britton Vicente, neurology, as an outpatient  Recently, she was seen in follow up - as she has had a stroke in the past  It was noted on repeat MRI of the brain that there were new areas of stroke in bilateral cerebral hemispheres  She has had CT angiogram to check for vascular etiology of the stroke and ANSELMO for structural heart abnormalities  It was recommended that she undergo loop recorder implantation to rule out atrial fibrillation as cause of her stroke  She and her family were both present at this visit with Dr Stevan Harper and are agreeable to the plan  Review of Systems  ROS as noted above, otherwise 12 point review of systems was performed and is negative         Historical Information   Past Medical History:   Diagnosis Date    Diverticulosis     Hypertension     Morbid obesity ([address] )     Pancreatic cyst     Seizures ([facility] [address] )     last seizure 2013    Stroke [facility])     Type 2 diabetes mellitus without complication, without long-term current use of insulin ([address] ) 12/10/2018    Wears glasses      Past Surgical History:   Procedure Laterality Date    APPENDECTOMY      COLONOSCOPY      complete    SD COLONOSCOPY FLX DX W/VICKIJ SPEC WHEN PFRMD N/A 3/6/2018    Procedure: COLONOSCOPY with polypectomy;  Surgeon: Roxie Rogers MD;  Location: [facility]; Service: General     Family History:   Family History   Problem Relation Age of Onset    Stroke Father     Lung cancer Maternal Grandfather     Ovarian cancer Mother         Left ovary     Lung cancer Paternal Grandfather     Other Other         MSH6-related Powers syndrome     Stroke Family        Social History   History   Alcohol Use No     History   Drug Use No     History   Smoking Status    Never Smoker   Smokeless Tobacco    Never Used         Meds/Allergies   all medications and allergies reviewed  Home Medications:   Prescriptions Prior to Admission   Medication    aspirin 81 mg chewable tablet    atorvastatin (LIPITOR) 80 mg tablet    clopidogrel (PLAVIX) 75 mg tablet    cyclobenzaprine (FLEXERIL) 5 mg tablet    donepezil (ARICEPT) 10 mg tablet    hydrochlorothiazide (MICROZIDE) 12 5 mg capsule    levETIRAcetam (KEPPRA) 750 mg tablet    metFORMIN (GLUCOPHAGE) 500 mg tablet    acetaminophen (TYLENOL) 325 mg tablet    cholecalciferol (VITAMIN D3) 1,000 units tablet    Cyanocobalamin 1000 MCG/ML KIT    ergocalciferol (VITAMIN D2) 50,000 units    hydrochlorothiazide (HYDRODIURIL) 25 mg tablet    oxyCODONE-acetaminophen (PERCOCET) 5-325 mg per tablet    vitamin B-12 (CYANOCOBALAMIN) 500 MCG TABS       No Known Allergies    Objective   Vitals: There were no vitals taken for this visit        No intake or output data in the 24 hours ending 01/16/19 0657    Invasive Devices          No matching active lines, drains, or airways          Physical Exam  Physical Exam:   GEN: NAD, alert and oriented, well appearing  SKIN: dry without significant lesions or rashes  HEENT: NCAT, PERRL, EOMs intact  NECK: No JVD appreciated  CARDIOVASCULAR: RRR  LUNGS: Clear to auscultation bilaterally without wheezes, rhonchi, or rales  ABDOMEN: Soft, nontender, nondistended    PSYCH: Normal mood and affect  NEURO: CN ll-Xll grossly intact      Lab Results: I have personally reviewed pertinent lab results  Invalid input(s): LABGLOM              Imaging: I have personally reviewed pertinent reports  Results for orders placed during the hospital encounter of 10/19/17   Echo complete with contrast if indicated    Narrative [address] [facility]  [phone]    Transthoracic Echocardiogram  2D, M-mode, Doppler, and Color Doppler    Study date:  19-Oct-2017    Patient: Jhoana Kennedy  MR number: [id number]  Account number: [de-identified]  : 1950  Age: 79 years  Gender: Female  Status: Outpatient  Location: [address] [place] [address]  Height: 67 in  Weight: 229 5 lb  BP: 140/ 90 mmHg    Indications: Murmur  Diagnoses: R01 1 - Cardiac murmur, unspecified    Sonographer:  NINA Fabian  Primary Physician:  Bhumi Coffey MD  Referring Physician:  Quirino Hager MD  Group:  [facility] [id number]  Interpreting Physician:  Marry Keating MD    SUMMARY    LEFT VENTRICLE:  Systolic function was normal by visual assessment  Ejection fraction was estimated to be 60 %  There were no regional wall motion abnormalities  Wall thickness was mildly increased  Doppler parameters were consistent with abnormal left ventricular relaxation (grade 1 diastolic dysfunction)  MITRAL VALVE:  There was mild regurgitation  TRICUSPID VALVE:  There was mild regurgitation  PULMONIC VALVE:  There was trace regurgitation  COMPARISONS:  There has been no significant interval change  Comparison was made with the previous study of 2013  HISTORY: PRIOR HISTORY: Remote CVA, Murmur, Hypertension, Dyslipidemia    PROCEDURE: The study was performed in the [address] [place] [address]  This was a routine study  The transthoracic approach was used   The study included complete 2D imaging, M-mode, complete spectral Doppler, and color Doppler  The  heart rate was 72 bpm, at the start of the study  Images were obtained from the parasternal, apical, subcostal, and suprasternal notch acoustic windows  Image quality was adequate  LEFT VENTRICLE: Size was normal  Systolic function was normal by visual assessment  Ejection fraction was estimated to be 60 %  There were no regional wall motion abnormalities  Wall thickness was mildly increased  DOPPLER: There was an  increased relative contribution of atrial contraction to ventricular filling  Doppler parameters were consistent with abnormal left ventricular relaxation (grade 1 diastolic dysfunction)  RIGHT VENTRICLE: The size was normal  Systolic function was normal  DOPPLER: Systolic pressure was within the normal range  Estimated peak pressure was 30 mmHg  LEFT ATRIUM: Size was normal     RIGHT ATRIUM: Size was normal     MITRAL VALVE: Valve structure was normal  There was normal leaflet separation  DOPPLER: The transmitral velocity was within the normal range  There was no evidence for stenosis  There was mild regurgitation  AORTIC VALVE: The valve was trileaflet  Leaflets exhibited normal thickness and normal cuspal separation  DOPPLER: Transaortic velocity was within the normal range  There was no evidence for stenosis  There was no regurgitation  TRICUSPID VALVE: The valve structure was normal  There was normal leaflet separation  DOPPLER: The transtricuspid velocity was within the normal range  There was no evidence for stenosis  There was mild regurgitation  PULMONIC VALVE: Leaflets exhibited normal thickness, no calcification, and normal cuspal separation  DOPPLER: The transpulmonic velocity was within the normal range  There was trace regurgitation  PERICARDIUM: There was no pericardial effusion  AORTA: The root exhibited normal size  SYSTEMIC VEINS: IVC: The inferior vena cava was normal in size and course   Respirophasic changes were normal     SYSTEM MEASUREMENT TABLES    2D  %FS: 34 09 %  AV Diam: 3 3 cm  EDV(Teich): 91 63 ml  EF(Cube): 71 37 %  EF(Teich): 63 2 %  ESV(Cube): 25 8 ml  ESV(Teich): 33 72 ml  IVSd: 1 22 cm  LA Area: 17 63 cm2  LA Diam: 3 73 cm  LVEDV MOD A4C: 73 65 ml  LVEF MOD A4C: 60 08 %  LVESV MOD A4C: 29 4 ml  LVIDd: 4 48 cm  LVIDs: 2 95 cm  LVLd A4C: 8 44 cm  LVLs A4C: 6 85 cm  LVPWd: 1 14 cm  RA Area: 13 15 cm2  RV Diam: 3 15 cm  SI(Cube): 29 91 ml/m2  SI(Teich): 26 94 ml/m2  SV MOD A4C: 44 25 ml  SV(Cube): 64 31 ml  SV(Teich): 57 91 ml    CW  TR MaxP 05 mmHg  TR Vmax: 2 6 m/s    MM  TAPSE: 2 43 cm    PW  AVC: 385 65 ms  E': 0 08 m/s  E/E': 5 69  MV A Cm: 0 91 m/s  MV Dec [place]: 3 44 m/s2  MV DecT: 140 01 ms  MV E Cm: 0 48 m/s  MV E/A Ratio: 0 53    [employer] Accredited Echocardiography Laboratory    Prepared and electronically signed by    Dot Vides MD  Signed 19-Oct-2017 09:02:19             Code Status: No Order

## 2019-01-16 NOTE — DISCHARGE INSTRUCTIONS
Keep loop recorder incision dry for one week  Do not use lotions/powders/creams on incision  Remove outer bandage 48 hours after procedure - if present, leave underlying steri-strips in place, they will either fall off on their own or will be removed at 2 week follow up appointment  Please call the office [phone] if you notice redness, swelling, bleeding, or drainage from incision or if you develop fevers  Cardiac Loop Recorder Insertion      WHAT YOU SHOULD KNOW:    A cardiac loop recorder is a device used to diagnose heart rhythm problems, such as a fast or irregular heartbeat  It is implanted in your left chest, just under the skin  The device records a pattern of your heart's rhythm, called an EKG  Your device records automatic EKGs, depending on how your caregiver programs it  You may also receive a handheld controller  You press a button on the controller when you have symptoms, such as dizziness, lightheadedness, or palpitations  The device will record an EKG at that moment  The recording can help your caregiver see if your symptoms may be caused by heart rhythm problems  Your caregiver will remove the device after it has collected enough data  You may need the device for up to 3 years  The procedure to remove the device is similar to the procedure used to implant it       AFTER YOU LEAVE:    Follow up with your cardiologist as directed: You will need to return in 1 to 2 weeks  Your cardiologist will check your incision  He may also program your device settings again  He will retrieve data from the device every 1 to 3 months with a monitor held over your skin  You may be able to transmit data from your device from home as well  You will do this by calling a number provided by your cardiologist, or as they have instructed you  Ask for information about this process  Write down your questions so you remember to ask them during your visits       Wound care: Keep loop recorder incision dry for one week  Do not use lotions/powders/creams on incision  Remove outer bandage 48 hours after procedure - leave underlying steri-strips in place, they will either fall off on their own or will be removed at 2 week follow up appointment  Please call the office if you notice redness, swelling, bleeding, or drainage from incision or if you develop fevers  After that first week, carefully wash your incision with soap and water  Keep the area clean and dry until it heals       Return to activity: If you received anesthesia, you will not be able to drive for 24 hours  Otherwise, most people can return to normal activities soon after the procedure  Your cardiologist may want to know if your work involves electrical current or high-voltage equipment  Ask about other electrical items that could interfere with your cardiac loop recorder       Contact your cardiologist if:   · You have a fever or chills  · Your wound is red, swollen, or draining pus  · You have questions or concerns about your condition or care  Seek care immediately or call 911 if:   · You feel weak, dizzy, or faint  · You lose consciousness  © 2014 [employer] is for End User's use only and may not be sold, redistributed or otherwise used for commercial purposes  All illustrations and images included in CareNotes® are the copyrighted property of [employer]  or [employer]  The above information is an  only  It is not intended as medical advice for individual conditions or treatments  Talk to your doctor, nurse or pharmacist before following any medical regimen to see if it is safe and effective for you

## 2019-01-30 ENCOUNTER — IN-CLINIC DEVICE VISIT (OUTPATIENT)
Dept: CARDIOLOGY CLINIC | Facility: CLINIC | Age: 69
End: 2019-01-30

## 2019-01-30 DIAGNOSIS — Z45.09 ENCOUNTER FOR ADJUSTMENT OR MANAGEMENT OF CARDIAC DEVICE: ICD-10-CM

## 2019-01-30 DIAGNOSIS — Z95.818 PRESENCE OF OTHER CARDIAC IMPLANTS AND GRAFTS: ICD-10-CM

## 2019-01-30 DIAGNOSIS — I63.9 CRYPTOGENIC STROKE (HCC): Primary | ICD-10-CM

## 2019-01-30 PROCEDURE — 99024 POSTOP FOLLOW-UP VISIT: CPT | Performed by: INTERNAL MEDICINE

## 2019-01-30 NOTE — PROGRESS NOTES
Results for orders placed or performed in visit on 01/30/19   Cardiac EP device report    Narrative    MDT LOOP  DEVICE INTERROGATED IN THE [facility] OFFICE  WOUND CHECK: INCISION CLEAN AND DRY WITH EDGES APPROXIMATED; SUTURES REMOVED; WOUND CARE AND RESTRICTIONS REVIEWED WITH PATIENT  BATTERY STATUS "GOOD"  NO DEVICE DETECTED & NO PT ACTIVATED EPISODES  PRESENTING RHYTHM NSR  NORMAL DEVICE FUNCTION   GV

## 2019-03-07 LAB — HBA1C MFR BLD HPLC: 6.4 %

## 2019-03-25 ENCOUNTER — OFFICE VISIT (OUTPATIENT)
Dept: FAMILY MEDICINE CLINIC | Facility: CLINIC | Age: 69
End: 2019-03-25

## 2019-03-25 ENCOUNTER — TELEPHONE (OUTPATIENT)
Dept: FAMILY MEDICINE CLINIC | Facility: CLINIC | Age: 69
End: 2019-03-25

## 2019-03-25 VITALS
BODY MASS INDEX: 32.62 KG/M2 | HEART RATE: 84 BPM | SYSTOLIC BLOOD PRESSURE: 122 MMHG | DIASTOLIC BLOOD PRESSURE: 78 MMHG | RESPIRATION RATE: 16 BRPM | HEIGHT: 67 IN | WEIGHT: 207.8 LBS | TEMPERATURE: 98.8 F

## 2019-03-25 DIAGNOSIS — Z11.4 SCREENING FOR HIV (HUMAN IMMUNODEFICIENCY VIRUS): ICD-10-CM

## 2019-03-25 DIAGNOSIS — R56.9 SEIZURE (HCC): ICD-10-CM

## 2019-03-25 DIAGNOSIS — Z13.820 SCREENING FOR OSTEOPOROSIS: ICD-10-CM

## 2019-03-25 DIAGNOSIS — Z00.00 MEDICARE ANNUAL WELLNESS VISIT, INITIAL: Primary | ICD-10-CM

## 2019-03-25 DIAGNOSIS — Z11.59 ENCOUNTER FOR HEPATITIS C SCREENING TEST FOR LOW RISK PATIENT: ICD-10-CM

## 2019-03-25 DIAGNOSIS — Z23 ENCOUNTER FOR IMMUNIZATION: ICD-10-CM

## 2019-03-25 DIAGNOSIS — E78.49 OTHER HYPERLIPIDEMIA: ICD-10-CM

## 2019-03-25 DIAGNOSIS — I10 ESSENTIAL HYPERTENSION: ICD-10-CM

## 2019-03-25 DIAGNOSIS — E11.9 TYPE 2 DIABETES MELLITUS WITHOUT COMPLICATION, WITHOUT LONG-TERM CURRENT USE OF INSULIN (HCC): ICD-10-CM

## 2019-03-25 DIAGNOSIS — G31.84 MILD COGNITIVE IMPAIRMENT: ICD-10-CM

## 2019-03-25 DIAGNOSIS — I63.10 CEREBROVASCULAR ACCIDENT (CVA) DUE TO EMBOLISM OF PRECEREBRAL ARTERY (HCC): ICD-10-CM

## 2019-03-25 PROCEDURE — 99213 OFFICE O/P EST LOW 20 MIN: CPT | Performed by: FAMILY MEDICINE

## 2019-03-25 PROCEDURE — G0009 ADMIN PNEUMOCOCCAL VACCINE: HCPCS | Performed by: FAMILY MEDICINE

## 2019-03-25 PROCEDURE — 90732 PPSV23 VACC 2 YRS+ SUBQ/IM: CPT | Performed by: FAMILY MEDICINE

## 2019-03-25 PROCEDURE — G0438 PPPS, INITIAL VISIT: HCPCS | Performed by: FAMILY MEDICINE

## 2019-03-25 NOTE — ASSESSMENT & PLAN NOTE
- History of posterior circulation strokes in 2013, MRI brain 5/2018 showed findings concerning for possible embolic stroke   - Stable, no new symptoms or focal neurologic deficits   - Follows closely with neurology   - Cardiac loop recorder was placed looking for arrhythmogenic source of embolic stroke, so far no episodes with normal sinus rhythm   - Continue aspirin 81 mg and Plavix 75 mg daily   - Statin and blood pressure control

## 2019-03-25 NOTE — ASSESSMENT & PLAN NOTE
- Well-controlled, /78 today   - Continue hydrochlorothiazide 12 5 mg daily   - Will discuss possible switch to ACE inhibitor next visit given diabetes

## 2019-03-25 NOTE — ASSESSMENT & PLAN NOTE
Lab Results   Component Value Date    HGBA1C 6 4 03/07/2019     - A1c improved from 6 9, encouraged continued diet modification and exercise   - Continue Metformin 500 mg daily   - A1c in 3 months, urine microalbumin

## 2019-03-25 NOTE — PROGRESS NOTES
Assessment and Plan:    Problem List Items Addressed This Visit        Endocrine    Type 2 diabetes mellitus without complication, without long-term current use of insulin (HCC)    Relevant Orders    Hemoglobin A1C    Microalbumin / creatinine urine ratio       Cardiovascular and Mediastinum    Essential hypertension    Cerebrovascular accident (CVA) due to embolism of precerebral artery ([address] )       Nervous and Auditory    Mild cognitive impairment       Other    Seizure ([facility] )    HLD (hyperlipidemia)    Medicare annual wellness visit, initial - Primary      Other Visit Diagnoses     Encounter for immunization        Relevant Orders    PNEUMOCOCCAL POLYSACCHARIDE VACCINE 23-VALENT =>1YO SQ IM (Completed)    Encounter for hepatitis C screening test for low risk patient        Relevant Orders    Hepatitis C antibody    Screening for HIV (human immunodeficiency virus)        Relevant Orders    HIV 1/2 AG-AB combo    Screening for osteoporosis        Relevant Orders    DXA bone density spine hip and pelvis        Health Maintenance Due   Topic Date Due    Hepatitis C Screening  1950    Medicare Annual Wellness Visit (AWV)  1950    Diabetic Foot Exam  09/23/1960    DM Eye Exam  09/23/1960    URINE MICROALBUMIN  09/23/1960    BMI: Followup Plan  09/23/1968    HEPATITIS B VACCINES (1 of 3 - Risk 3-dose series) 09/23/1969    DTaP,Tdap,and Td Vaccines (1 - Tdap) 09/23/1971    Pneumococcal PPSV23/PCV13 65+ Years / Low and Medium Risk (2 of 2 - PPSV23) 12/11/2016    MAMMOGRAM  04/11/2019         HPI:  Leola Arciniega is a 76 y o  female here for her Initial Wellness Visit      Patient Active Problem List   Diagnosis    FH: colon cancer    [facility]A [facility]cere[facility]) ([facility] )    Seizure ([facility] )    HLD (hyperlipidemia)    Essential hypertension    Mild cognitive impairment    Tremor, essential    Vitamin D deficiency    Low serum vitamin B12    Cerebrovascular accident (CVA) due to embolism of precerebral artery (HCC)    Type 2 diabetes mellitus without complication, without long-term current use of insulin ([facility] )    Medicare annual wellness visit, initial     Past Medical History:   Diagnosis Date    Diverticulosis     Hypertension     Morbid obesity ([facility] )     Pancreatic cyst     Seizures ([facility] )     last seizure 2013    Stroke [facility])     Type 2 diabetes mellitus without complication, without long-term current use of insulin ([facility] ) 12/10/2018    Wears glasses      Past Surgical History:   Procedure Laterality Date    APPENDECTOMY      COLONOSCOPY      complete    [place] COLONOSCOPY FLX DX W/COLLJ SPEC WHEN PFRMD N/A 3/6/2018    Procedure: COLONOSCOPY with polypectomy;  Surgeon: Monico Argueta MD;  Location: [facility]; Service: General     Family History   Problem Relation Age of Onset    Stroke Father     Lung cancer Maternal Grandfather     Ovarian cancer Mother         Left ovary     Lung cancer Paternal Grandfather     Other Other         MSH6-related Powers syndrome     Stroke Family      Social History     Tobacco Use   Smoking Status Never Smoker   Smokeless Tobacco Never Used     Social History     Substance and Sexual Activity   Alcohol Use No      Social History     Substance and Sexual Activity   Drug Use No       Current Outpatient Medications   Medication Sig Dispense Refill    acetaminophen (TYLENOL) 325 mg tablet Take 325 mg by mouth every 4 (four) hours as needed for mild pain TAKE 2 TABLETS EVERY 4 HOURS PRN      aspirin 81 mg chewable tablet Chew 1 tablet daily      atorvastatin (LIPITOR) 80 mg tablet Take by mouth      cholecalciferol (VITAMIN D3) 1,000 units tablet Take 1 tablet (1,000 Units total) by mouth daily 30 tablet 5    clopidogrel (PLAVIX) 75 mg tablet Take 1 tablet (75 mg total) by mouth daily Wait 48 hours before starting next dose  1 tablet 0    Cyanocobalamin 1000 MCG/ML KIT INJECT 1ML ONCE A WEEK FOR 8 WEEKS  IM INTO DELTOID   (Patient not taking: Reported on 10/23/2018 ) 1 mL 0    cyclobenzaprine (FLEXERIL) 5 mg tablet Take 1 tablet by mouth      donepezil (ARICEPT) 10 mg tablet 1 tab daily 30 tablet 0    ergocalciferol (VITAMIN D2) 50,000 units Take 1 capsule (50,000 Units total) by mouth once a week for 8 doses (Patient not taking: Reported on 12/4/2018 ) 8 capsule 0    hydrochlorothiazide (HYDRODIURIL) 25 mg tablet Take 0 5 tablets (12 5 mg total) by mouth daily (Patient not taking: Reported on 12/4/2018 ) 30 tablet 2    hydrochlorothiazide (MICROZIDE) 12 5 mg capsule       levETIRAcetam (KEPPRA) 750 mg tablet Take by mouth 2 (two) times a day        metFORMIN (GLUCOPHAGE) 500 mg tablet Take 1 tablet (500 mg total) by mouth daily with breakfast 90 tablet 0    oxyCODONE-acetaminophen (PERCOCET) 5-325 mg per tablet Take 1 tablet by mouth every 6 (six) hours as needed for moderate pain Max Daily Amount: 4 tablets (Patient not taking: Reported on 9/24/2018 ) 12 tablet 0    vitamin B-12 (CYANOCOBALAMIN) 500 MCG TABS Two tabs daily (Patient not taking: Reported on 10/23/2018 ) 60 tablet 11     No current facility-administered medications for this visit  No Known Allergies  Immunization History   Administered Date(s) Administered    INFLUENZA 09/28/2017    Influenza Quadrivalent Preservative Free 3 years and older IM 11/09/2015    Influenza TIV (IM) 12/02/2014    Influenza, high dose seasonal 0 5 mL 10/04/2018    Pneumococcal Conjugate 13-Valent 12/11/2015    Pneumococcal Polysaccharide PPV23 03/25/2019       Patient Care Team:  David De La Torre DO as PCP - General (Family Medicine)    Medicare Screening Tests and Risk Assessments:  Melida Wright is here for her Initial Wellness visit  Health Risk Assessment:  Patient rates overall health as good  Patient feels that their physical health rating is Same  Eyesight was rated as Same  Hearing was rated as Same  Patient feels that their emotional and mental health rating is Same   Pain experienced by patient in the last 7 days has been Some  Patient's pain rating has been 5/10  Emotional/Mental Health:  Patient has been feeling nervous/anxious  PHQ-9 Depression Screening:    Frequency of the following problems over the past two weeks:      1  Little interest or pleasure in doing things: 0 - not at all      2  Feeling down, depressed, or hopeless: 0 - not at all  PHQ-2 Score: 0          Broken Bones/Falls: Fall Risk Assessment:    In the past year, patient has experienced: No history of falling in past year          Bladder/Bowel:  Patient has not leaked urine accidently in the last six months  Patient reports no loss of bowel control  Immunizations:  Patient has had a flu vaccination within the last year  Patient has not received a shingles shot  Home Safety:  Patient has trouble with stairs inside or outside of their home  Patient currently reports that there are safety hazards present in home , working smoke alarms, working carbon monoxide detectors  Preventative Screenings:   Breast cancer screening performed, no colon cancer screen completed, no glaucoma eye exam completed    Nutrition:  Current diet: Diabetic and Low Carb with servings of the following:    Medications:  Patient is not currently taking any over-the-counter supplements  Patient is not able to manage medications  Lifestyle Choices:  Patient reports no tobacco use  Patient has not smoked or used tobacco in the past   Patient reports no alcohol use  Patient does not drive a vehicle  Patient wears seat belt  Current level of exercise of physical activity described by patient as: exercise class morning          Activities of Daily Living:  Can get out of bed by his or her self, able to dress self, unable to make own meals, unable to do own shopping, able to bathe self, unable to do laundry/housekeeping, unable to manage own money and other related tasks    Previous Hospitalizations:  Hospitalization or ED visit in past 12 months  Number of hospitalizations within the last year: 1-2        Advanced Directives:  Patient has decided on a power of   Patient has spoken to designated power of   Patient has not completed advanced directive  Preventative Screening/Counseling:      Cardiovascular:      General: Risks and Benefits Discussed and Screening Current      Counseling: Healthy Diet, Healthy Weight and Improve Exercise Tolerance      Comments: Diet and exercise discussed, lipid panel UTD  Diabetes:      General: Risks and Benefits Discussed and Screening Not Indicated      Counseling: Healthy Diet, Healthy Weight and Improve Physical Activity      Comments: Type 2 diabetes, well-controlled  Colorectal Cancer:      General: Risks and Benefits Discussed and Screening Current      Counseling: high fiber diet      Comments: Colonoscopy done 3/6/2018, Dr Brody Ayala  Breast Cancer:      General: Risks and Benefits Discussed and Screening Current          Cervical Cancer:      General: Risks and Benefits Discussed      Due for studies: Cervical Pap Smear      Comments: Unknown last date of pap with family history of ovarian cancer in mother- will schedule separate appointment for GYN exam/evaluation  Osteoporosis:      General: Risks and Benefits Discussed      Counseling: Calcium and Vitamin D Intake and Regular Weightbearing Exercise      Due for studies: DXA Axial          AAA:      General: Risks and Benefits Discussed and Screening Not Indicated          Glaucoma:      General: Risks and Benefits Discussed and Screening Current      Comments: Follows with ophthalmology regularly  HIV:      General: Risks and Benefits Discussed      Due for labs: ADRIAN      Comments: Screening ordered  Hepatitis C:      General: Risks and Benefits Discussed      Counseling: has received general HCV counseling      Additional Comments: Screening ordered      Advanced Directives:   Patient has no living will for healthcare, has durable POA for healthcare, patient does not have an advanced directive  Information on ACP and/or AD provided  Additional Comments: Will provide patient with 5 wishes blue folder at next visit       Immunizations:      Influenza: Risks & Benefits Discussed and Influenza UTD This Year      Pneumococcal: Risks & Benefits Discussed and Pneumococcal Due Today      Shingrix: Risks & Benefits Discussed and Shingrix Vaccine Needed Today      Zostavax: Risks & Benefits Discussed and Zostavox Vaccine Needed Today      TDAP: Risks & Benefits Discussed and Tdap Vaccine Needed Today        Savanah Ferrell, DO PGY-2   [facility]

## 2019-03-25 NOTE — TELEPHONE ENCOUNTER
The appointment for Gabriella is for her GYN-pap  Her appointment in June is for follow up, labs to be done prior to this visit

## 2019-03-25 NOTE — TELEPHONE ENCOUNTER
Patient's caregiver calling,  to find out when patient is to get BW done  Next appt here is 04/29/19 but the order is written to have done for 06/25/19

## 2019-03-25 NOTE — PROGRESS NOTES
Leola Arciniega 1950 female MRN: [id number]    Family Medicine Follow-up Visit    ASSESSMENT/PLAN  Problem List Items Addressed This Visit        Endocrine    Type 2 diabetes mellitus without complication, without long-term current use of insulin ([facility] )     Lab Results   Component Value Date    HGBA1C 6 4 03/07/2019     - A1c improved from 6 9, encouraged continued diet modification and exercise   - Continue Metformin 500 mg daily   - A1c in 3 months, urine microalbumin         Relevant Orders    Hemoglobin A1C    Microalbumin / creatinine urine ratio       Cardiovascular and Mediastinum    Essential hypertension     - Well-controlled, /78 today   - Continue hydrochlorothiazide 12 5 mg daily   - Will discuss possible switch to ACE inhibitor next visit given diabetes         Cerebrovascular accident (CVA) due to embolism of precerebral artery ([facility] )     - History of posterior circulation strokes in 2013, MRI brain 5/2018 showed findings concerning for possible embolic stroke   - Stable, no new symptoms or focal neurologic deficits   - Follows closely with neurology   - Cardiac loop recorder was placed looking for arrhythmogenic source of embolic stroke, so far no episodes with normal sinus rhythm   - Continue aspirin 81 mg and Plavix 75 mg daily   - Statin and blood pressure control             Nervous and Auditory    Mild cognitive impairment     - No concerns, unchanged from prior visit   - Continue Donepezil 10 mg daily             Other    Seizure ([facility] )     - Followed closely by neurology, no recent seizure activity   - Continue Keppra 750 mg BID          HLD (hyperlipidemia)     - Lipid panel UTD   - Continue Lipitor 80 mg daily          Medicare annual wellness visit, initial - Primary      Other Visit Diagnoses     Encounter for immunization        Relevant Orders    PNEUMOCOCCAL POLYSACCHARIDE VACCINE 23-VALENT =>1YO SQ IM (Completed)    Encounter for hepatitis C screening test for low risk patient Relevant Orders    Hepatitis C antibody    Screening for HIV (human immunodeficiency virus)        Relevant Orders    HIV 1/2 AG-AB combo    Screening for osteoporosis        Relevant Orders    DXA bone density spine hip and pelvis          Follow-up as soon as convenient for GYN exam  Additionally, follow-up in 3 months for chronic conditions  Will give advanced directive/5 wishes blue folder at that time  Future Appointments   Date Time Provider Joslyn Poole   4/29/2019  1:20 PM Normazheng Quirozs, DO SW [facility] [place]   5/6/2019 12:45 PM DEVICE REMOTE [place] [facility]   6/17/2019  1:00 PM Norma Jett, DO SW FP [facility] [place]   8/5/2019 12:45 PM [facility]ICE [facility] [place] [facility]   10/24/2019  3:15 PM Juliann Morales PA-C NEURO [facility]   11/4/2019 12:45 PM DEVICE [facility] [place] [facility]          SUBJECTIVE  CC: Medicare Wellness Visit and Follow-up      HPI:  Elaine Nicholson is a 76 y o  female with past medical history significant for CVA and seizures followed by neurology, non-insulin dependent type 2 DM, hypertension, hyperlipidemia and MCI who presents for her annual Medicare wellness visit and for follow-up of chronic conditions  She presents with her sister today who helps provide a portion of the history  She is currently residing at [facility], and she has no acute complaints or concerns  She has been working on her diet, and her sister comments that she switched all of her candy and snacks to sugar free  She also works with [employer] in her facility on the daily basis for exercise  She denies any changes in her health since last visit  She has had the implantable loop recorder placed to look for arrhythmias, and she states that has been normal so far  She does comment her vision is slowly worsening and she plans on making an appointment with her eye doctor Dr David Hilario       Review of Systems   Constitutional: Negative for activity change, appetite change, chills and fever  HENT: Negative for congestion, sore throat and trouble swallowing  Eyes: Positive for visual disturbance  Negative for discharge and redness  Respiratory: Negative for cough, chest tightness, shortness of breath and wheezing  Cardiovascular: Negative for chest pain, palpitations and leg swelling  Gastrointestinal: Negative for abdominal pain, constipation, diarrhea, nausea and vomiting  Genitourinary: Negative for decreased urine volume and difficulty urinating  Skin: Negative for rash  Neurological: Positive for tremors  Negative for dizziness, syncope, facial asymmetry, speech difficulty, weakness, light-headedness, numbness and headaches  Psychiatric/Behavioral: Negative for behavioral problems and confusion  The patient is not nervous/anxious  Historical Information   The patient history was reviewed as follows:    Past Medical History:   Diagnosis Date    Diverticulosis     Hypertension     Morbid [facility] ([facility] )     Pancreatic cyst     Seizures ([facility] )     last seizure 2013    Stroke [facility])     Type 2 diabetes mellitus without complication, without long-term current use of insulin ([facility] ) 12/10/2018    Wears glasses      Past Surgical History:   Procedure Laterality Date    APPENDECTOMY      COLONOSCOPY      complete    DE COLONOSCOPY FLX DX W/COLLJ SPEC WHEN PFRMD N/A 3/6/2018    Procedure: COLONOSCOPY with polypectomy;  Surgeon: Jag Hunt MD;  Location: [facility];   Service: General     Family History   Problem Relation Age of Onset    Stroke Father     Lung cancer Maternal Grandfather     Ovarian cancer Mother         Left ovary     Lung cancer Paternal Grandfather     Other Other         MSH6-related Powers syndrome     Stroke Family       Social History   Social History     Substance and Sexual Activity   Alcohol Use No     Social History     Substance and Sexual Activity   Drug Use No     Social History Tobacco Use   Smoking Status Never Smoker   Smokeless Tobacco Never Used       Medications:     Current Outpatient Medications:     aspirin 81 mg chewable tablet, Chew 1 tablet daily, Disp: , Rfl:     atorvastatin (LIPITOR) 80 mg tablet, Take by mouth, Disp: , Rfl:     cholecalciferol (VITAMIN D3) 1,000 units tablet, Take 1 tablet (1,000 Units total) by mouth daily, Disp: 30 tablet, Rfl: 5    clopidogrel (PLAVIX) 75 mg tablet, Take 1 tablet (75 mg total) by mouth daily Wait 48 hours before starting next dose , Disp: 1 tablet, Rfl: 0    cyclobenzaprine (FLEXERIL) 5 mg tablet, Take 1 tablet by mouth, Disp: , Rfl:     donepezil (ARICEPT) 10 mg tablet, 1 tab daily, Disp: 30 tablet, Rfl: 0    hydrochlorothiazide (MICROZIDE) 12 5 mg capsule, , Disp: , Rfl:     levETIRAcetam (KEPPRA) 750 mg tablet, Take by mouth 2 (two) times a day  , Disp: , Rfl:     metFORMIN (GLUCOPHAGE) 500 mg tablet, Take 1 tablet (500 mg total) by mouth daily with breakfast, Disp: 90 tablet, Rfl: 0  No Known Allergies    OBJECTIVE    Vitals:   Vitals:    03/25/19 1310   BP: 122/78   Pulse: 84   Resp: 16   Temp: 98 8 °F (37 1 °C)   Weight: 94 3 kg (207 lb 12 8 oz)   Height: 5' 6 5" (1 689 m)           Physical Exam   Constitutional: She is oriented to person, place, and time  She appears well-developed and well-nourished  No distress  HENT:   Head: Normocephalic and atraumatic  Right Ear: External ear normal    Left Ear: External ear normal    Eyes: Pupils are equal, round, and reactive to light  Conjunctivae and EOM are normal  Right eye exhibits no discharge  Left eye exhibits no discharge  Neck: Normal range of motion  Neck supple  Cardiovascular: Normal rate, regular rhythm, normal heart sounds and intact distal pulses  No murmur heard  Pulmonary/Chest: Effort normal and breath sounds normal  No stridor  No respiratory distress  She has no wheezes  She has no rales  Abdominal: Soft   Bowel sounds are normal  She exhibits no distension  There is no tenderness  There is no rebound and no guarding  Musculoskeletal: She exhibits no edema  Neurological: She is alert and oriented to person, place, and time  She exhibits normal muscle tone  No-no resting tremor    Skin: Skin is warm  She is not diaphoretic  Psychiatric: She has a normal mood and affect  Her behavior is normal    Vitals reviewed           Gene Pedroza DO, PGY-2  [facility]   3/25/2019

## 2019-03-26 NOTE — TELEPHONE ENCOUNTER
Called and spoke with Mary Jo Enamorado she is aware patient must have BW done prior to her visit in June  Thanks

## 2019-04-20 ENCOUNTER — HOSPITAL ENCOUNTER (OUTPATIENT)
Dept: RADIOLOGY | Age: 69
Discharge: HOME | End: 2019-04-20
Payer: MEDICARE

## 2019-04-20 DIAGNOSIS — Z13.820 SCREENING FOR OSTEOPOROSIS: ICD-10-CM

## 2019-04-20 PROCEDURE — 77080 DXA BONE DENSITY AXIAL: CPT

## 2019-04-29 ENCOUNTER — ANNUAL EXAM (OUTPATIENT)
Dept: FAMILY MEDICINE CLINIC | Facility: CLINIC | Age: 69
End: 2019-04-29

## 2019-04-29 VITALS
WEIGHT: 204 LBS | HEART RATE: 88 BPM | HEIGHT: 67 IN | TEMPERATURE: 98.3 F | RESPIRATION RATE: 16 BRPM | SYSTOLIC BLOOD PRESSURE: 122 MMHG | BODY MASS INDEX: 32.02 KG/M2 | DIASTOLIC BLOOD PRESSURE: 80 MMHG

## 2019-04-29 DIAGNOSIS — Z01.419 ENCOUNTER FOR GYNECOLOGICAL EXAMINATION: Primary | ICD-10-CM

## 2019-04-29 PROCEDURE — 87624 HPV HI-RISK TYP POOLED RSLT: CPT | Performed by: FAMILY MEDICINE

## 2019-04-29 PROCEDURE — G0145 SCR C/V CYTO,THINLAYER,RESCR: HCPCS | Performed by: FAMILY MEDICINE

## 2019-04-29 PROCEDURE — G0101 CA SCREEN;PELVIC/BREAST EXAM: HCPCS | Performed by: FAMILY MEDICINE

## 2019-05-01 LAB
HPV HR 12 DNA CVX QL NAA+PROBE: NEGATIVE
HPV16 DNA CVX QL NAA+PROBE: NEGATIVE
HPV18 DNA CVX QL NAA+PROBE: NEGATIVE

## 2019-05-03 LAB
LAB AP GYN PRIMARY INTERPRETATION: NORMAL
Lab: NORMAL

## 2019-05-06 ENCOUNTER — REMOTE DEVICE CLINIC VISIT (OUTPATIENT)
Dept: CARDIOLOGY CLINIC | Facility: CLINIC | Age: 69
End: 2019-05-06
Payer: MEDICARE

## 2019-05-06 DIAGNOSIS — I63.9 CRYPTOGENIC STROKE (HCC): Primary | ICD-10-CM

## 2019-05-06 DIAGNOSIS — Z45.09 ENCOUNTER FOR LOOP RECORDER CHECK: ICD-10-CM

## 2019-05-06 PROCEDURE — 93298 REM INTERROG DEV EVAL SCRMS: CPT | Performed by: INTERNAL MEDICINE

## 2019-05-06 PROCEDURE — 93296 REM INTERROG EVL PM/IDS: CPT | Performed by: INTERNAL MEDICINE

## 2019-06-17 ENCOUNTER — OFFICE VISIT (OUTPATIENT)
Dept: FAMILY MEDICINE CLINIC | Facility: CLINIC | Age: 69
End: 2019-06-17

## 2019-06-17 VITALS
SYSTOLIC BLOOD PRESSURE: 140 MMHG | TEMPERATURE: 98.7 F | BODY MASS INDEX: 31.23 KG/M2 | DIASTOLIC BLOOD PRESSURE: 90 MMHG | HEART RATE: 82 BPM | RESPIRATION RATE: 16 BRPM | WEIGHT: 199 LBS | HEIGHT: 67 IN

## 2019-06-17 DIAGNOSIS — G31.84 MILD COGNITIVE IMPAIRMENT: ICD-10-CM

## 2019-06-17 DIAGNOSIS — Z12.31 SCREENING MAMMOGRAM, ENCOUNTER FOR: ICD-10-CM

## 2019-06-17 DIAGNOSIS — E11.9 TYPE 2 DIABETES MELLITUS WITHOUT COMPLICATION, WITHOUT LONG-TERM CURRENT USE OF INSULIN (HCC): Primary | ICD-10-CM

## 2019-06-17 DIAGNOSIS — I63.019 CEREBROVASCULAR ACCIDENT (CVA) DUE TO THROMBOSIS OF VERTEBRAL ARTERY, UNSPECIFIED BLOOD VESSEL LATERALITY (HCC): ICD-10-CM

## 2019-06-17 DIAGNOSIS — I10 ESSENTIAL HYPERTENSION: ICD-10-CM

## 2019-06-17 DIAGNOSIS — Z80.41 FAMILY HISTORY OF OVARIAN CANCER: ICD-10-CM

## 2019-06-17 DIAGNOSIS — R56.9 SEIZURE (HCC): ICD-10-CM

## 2019-06-17 DIAGNOSIS — E78.49 OTHER HYPERLIPIDEMIA: ICD-10-CM

## 2019-06-17 DIAGNOSIS — E55.9 VITAMIN D DEFICIENCY: ICD-10-CM

## 2019-06-17 LAB — SL AMB POCT HEMOGLOBIN AIC: 5.8 (ref ?–6.5)

## 2019-06-17 PROCEDURE — 99213 OFFICE O/P EST LOW 20 MIN: CPT | Performed by: FAMILY MEDICINE

## 2019-06-17 PROCEDURE — 83036 HEMOGLOBIN GLYCOSYLATED A1C: CPT | Performed by: FAMILY MEDICINE

## 2019-06-17 RX ORDER — LISINOPRIL 10 MG/1
10 TABLET ORAL DAILY
Qty: 30 TABLET | Refills: 5 | Status: SHIPPED | OUTPATIENT
Start: 2019-06-17 | End: 2019-09-12 | Stop reason: SDUPTHER

## 2019-06-25 LAB
HBA1C MFR BLD HPLC: 6 %
HCV AB SER-ACNC: NEGATIVE

## 2019-07-18 ENCOUNTER — OFFICE VISIT (OUTPATIENT)
Dept: FAMILY MEDICINE CLINIC | Facility: CLINIC | Age: 69
End: 2019-07-18

## 2019-07-18 VITALS
WEIGHT: 187.8 LBS | HEIGHT: 67 IN | HEART RATE: 76 BPM | DIASTOLIC BLOOD PRESSURE: 80 MMHG | BODY MASS INDEX: 29.47 KG/M2 | SYSTOLIC BLOOD PRESSURE: 145 MMHG | TEMPERATURE: 99.4 F

## 2019-07-18 DIAGNOSIS — E87.6 HYPOKALEMIA: ICD-10-CM

## 2019-07-18 DIAGNOSIS — I10 ESSENTIAL HYPERTENSION: Primary | ICD-10-CM

## 2019-07-18 PROCEDURE — 99213 OFFICE O/P EST LOW 20 MIN: CPT | Performed by: FAMILY MEDICINE

## 2019-07-18 RX ORDER — ADHESIVE BANDAGE 3/4"
BANDAGE TOPICAL DAILY
Qty: 1 EACH | Refills: 0 | Status: SHIPPED | OUTPATIENT
Start: 2019-07-18

## 2019-07-18 NOTE — ASSESSMENT & PLAN NOTE
- Potassium 3 4 on recent BMP   - We discussed consuming potassium rich foods and patient was given a handout on increasing potassium in her diet

## 2019-07-18 NOTE — PROGRESS NOTES
Assessment/Plan:    Hypokalemia  - Potassium 3 4 on recent BMP   - We discussed consuming potassium rich foods and patient was given a handout on increasing potassium in her diet  Essential hypertension  - Patient has had no problems switching to lisinopril for her hypertension  - Creatinine 0 80 on recent BMP   - BP today was 145/80, slightly above goal but will hold off on increasing her blood pressure medication at this time   - Per sister-in-law, her facility monitors her blood pressure regularly  Will ask for blood pressure log to be faxed over and will review  If persistent >140/90 recordings will increase lisinopril dose  Diagnoses and all orders for this visit:    Essential hypertension  -     Blood Pressure Monitoring (BLOOD PRESSURE CUFF) MISC; by Does not apply route daily    Hypokalemia        Follow-up in 3 months, sooner as needed  Chief Complaint: Follow up blood pressure    Subjective:      Patient ID: Werner Drummond is a 76 y o  female  Cydne Pair is a 76year old female with past medical history significant for CVA and seizures followed by neurology, non-insulin dependent type 2 DM, hypertension, hyperlipidemia and MCIis here to follow up for her blood pressure and blood work  At her last visit she was switched to lisinopril from HCTZ due to a history of diabetes  She has not had any side effects noted from switching medications including cough, chest pain, rash, or palpitations  She has been losing weight by switching to a low sugar diet and eating less desserts  She is happy with the weight loss and feels like it is helping her blood pressure  She denies any chest pain or shortness of breath       The following portions of the patient's history were reviewed and updated as appropriate: allergies, current medications, past family history, past medical history, past social history, past surgical history and problem list     Review of Systems   Constitutional: Negative for activity change, appetite change and unexpected weight change (Patient has lost about 20 lbs by switching to sugar free foods)  Eyes: Negative for visual disturbance  Respiratory: Negative for cough, chest tightness, shortness of breath and wheezing  Cardiovascular: Negative for chest pain, palpitations and leg swelling  Gastrointestinal: Negative for abdominal pain, nausea and vomiting  Skin: Negative for rash  Objective:      /80 (BP Location: Left arm)   Pulse 76   Temp 99 4 °F (37 4 °C)   Ht 5' 7" (1 702 m)   Wt 85 2 kg (187 lb 12 8 oz)   BMI 29 41 kg/m²        Physical Exam   Constitutional: She is oriented to person, place, and time  She appears well-developed and well-nourished  No distress  HENT:   Head: Normocephalic and atraumatic  Neck: Normal range of motion  Neck supple  Cardiovascular: Normal rate, regular rhythm, normal heart sounds and intact distal pulses  Exam reveals no gallop and no friction rub  No murmur heard  Pulmonary/Chest: Effort normal and breath sounds normal  No stridor  No respiratory distress  She has no wheezes  She has no rales  She exhibits no tenderness  Musculoskeletal: She exhibits no edema or tenderness  Neurological: She is alert and oriented to person, place, and time  Skin: Skin is warm and dry  No rash noted  She is not diaphoretic  Psychiatric: She has a normal mood and affect  Her behavior is normal  Thought content normal    Vitals reviewed        Freddy García DO PGY-3  [facility]

## 2019-07-18 NOTE — ASSESSMENT & PLAN NOTE
- Patient has had no problems switching to lisinopril for her hypertension  - Creatinine 0 80 on recent BMP   - BP today was 145/80, slightly above goal but will hold off on increasing her blood pressure medication at this time   - Per sister-in-law, her facility monitors her blood pressure regularly  Will ask for blood pressure log to be faxed over and will review  If persistent >140/90 recordings will increase lisinopril dose

## 2019-07-18 NOTE — PATIENT INSTRUCTIONS
Your potassium is 3 4 which is slightly low, please eat more potassium containing foods as listed below  Continue lisinopril 10 mg daily, please fax us a copy of your blood pressure records  Follow-up in 3 months  Potassium Content of Foods List   WHAT YOU NEED TO KNOW:   Potassium is a mineral that is found in most foods  Potassium helps to balance fluids and minerals in your body  It also helps your body maintain a normal blood pressure  Potassium helps your muscles contract and your nerves function normally  You may need to increase or decrease potassium if you have certain health conditions  DISCHARGE INSTRUCTIONS:   Why you may need to change the amount of potassium you eat:   · You may need more potassium  if you have hypokalemia (low potassium levels) or high blood pressure  You may also need more potassium if you are taking diuretics  Diuretics and certain medicines cause your body to lose potassium  · You may need less potassium  in your diet if you have hyperkalemia (high potassium levels) or kidney disease  Potassium content of fruit:  The amount of potassium in milligrams (mg) contained in each fruit or serving of fruit is listed beside the item    · High-potassium foods (more than 200 mg per serving):      ¨ 1 medium banana (425)    ¨ ½ of a papaya (390)    ¨ ½ cup of prune juice (370)    ¨ ¼ cup of raisins (270)    ¨ 1 medium charity (325) or kiwi (240)    ¨ 1 small orange (240) or ½ cup of orange juice (235)    ¨ ½ cup of cubed cantaloupe (215) or diced honeydew melon (200)    ¨ 1 medium pear (200)    · Medium-potassium foods (50 to 200 mg per serving):      ¨ 1 medium peach (185)    ¨ 1 small apple or ½ cup of apple juice (150)    ¨ ½ cup of peaches canned in juice (120)    ¨ ½ cup of canned pineapple (100)    ¨ ½ cup of fresh, sliced strawberries (926)    ¨ ½ cup of watermelon (85)    · Low-potassium foods (less than 50 mg per serving):      ¨ ½ cup of cranberries (45) or cranberry juice cocktail (20)    ¨ ½ cup of nectar of papaya, charity, or pear (35)  Potassium content of vegetables:   · High-potassium foods (more than 200 mg per serving):      ¨ 1 medium baked potato, with skin (925)    ¨ 1 baked medium sweet potato, with skin (450)    ¨ ½ cup of tomato or vegetable juice (275), or 1 medium raw tomato (290)    ¨ ½ cup of mushrooms (280)    ¨ ½ cup of fresh brussels sprouts (250)    ¨ ½ cup of cooked zucchini (220) or winter squash (250)    ¨ ¼ of a medium avocado (245)    ¨ ½ cup of broccoli (230)    · Medium-potassium foods (50 to 200 mg per serving):      ¨ ½ cup of corn (195)    ¨ ½ cup of fresh or cooked carrots (180)    ¨ ½ cup of fresh cauliflower (150)    ¨ ½ cup of asparagus (155)    ¨ ½ cup of canned peas (90)     ¨ 1 cup of lettuce, all types (100)    ¨ ½ cup of fresh green beans (90)    ¨ ½ cup of frozen green beans (85)    ¨ ½ cup of cucumber (80)  Potassium content of protein foods:   · High-potassium foods (more than 200 mg per serving):      ¨ ½ cup of cooked justice beans (400) or lentils (365)    ¨ 1 cup of soy milk (300)    ¨ 3 ounces of baked or broiled salmon (319)    ¨ 3 ounces of roasted turkey, dark meat (250)    ¨ ¼ cup of sunflower seeds (241)    ¨ 3 ounces of cooked lean beef (224)    ¨ 2 tablespoons of smooth peanut butter (210)    · Medium-potassium foods (50 to 200 mg per serving):      ¨ 1 ounce of salted peanuts, almonds, or cashews (200)    ¨ 1 large egg (60 mg)  Potassium content of dairy foods:   · High-potassium foods (more than 200 mg per serving):      ¨ 6 ounces of yogurt (260 to 435)    ¨ 1 cup of nonfat, low-fat, or whole milk (350 to 380)    · Medium-potassium foods (50 to 200 mg per serving):      ¨ ½ cup of ricotta cheese (154)    ¨ ½ cup of vanilla ice cream (131)    ¨ ½ cup of low-fat (2%) cottage cheese (110)    · Low-potassium foods (less than 50 mg per serving):      ¨ 1 ounce of cheese (20 to 30)    Potassium content of grains:   · 1 slice of white bread (30)    · ½ cup of white or brown rice (50)    · ½ cup of spaghetti or macaroni (30)    · 1 flour or corn tortilla (50)    · 1 four-inch waffle (50)  Potassium content of other foods:   · 1 tablespoon of molasses (295)    · 1½ ounces of chocolate (165)    · Some salt substitutes may contain a high amount of potassium  Check the food label to find the amount of potassium it contains  © 2017 [employer] Information is for End User's use only and may not be sold, redistributed or otherwise used for commercial purposes  All illustrations and images included in CareNotes® are the copyrighted property of [employer]  or Steve Waters  The above information is an  only  It is not intended as medical advice for individual conditions or treatments  Talk to your doctor, nurse or pharmacist before following any medical regimen to see if it is safe and effective for you

## 2019-07-24 ENCOUNTER — TELEPHONE (OUTPATIENT)
Dept: FAMILY MEDICINE CLINIC | Facility: CLINIC | Age: 69
End: 2019-07-24

## 2019-07-24 DIAGNOSIS — I10 ESSENTIAL HYPERTENSION: Primary | ICD-10-CM

## 2019-07-24 NOTE — TELEPHONE ENCOUNTER
Patient's caregiver calling with a new order for a daily BP check dicussed at last office visit  Please specify how often

## 2019-07-25 RX ORDER — BENZOCAINE/MENTHOL 6 MG-10 MG
LOZENGE MUCOUS MEMBRANE DAILY
Qty: 1 EACH | Refills: 0 | Status: SHIPPED | OUTPATIENT
Start: 2019-07-25

## 2019-08-05 ENCOUNTER — REMOTE DEVICE CLINIC VISIT (OUTPATIENT)
Dept: CARDIOLOGY CLINIC | Facility: CLINIC | Age: 69
End: 2019-08-05
Payer: MEDICARE

## 2019-08-05 DIAGNOSIS — I63.9 CRYPTOGENIC STROKE (HCC): Primary | ICD-10-CM

## 2019-08-05 DIAGNOSIS — Z95.818 PRESENCE OF OTHER CARDIAC IMPLANTS AND GRAFTS: ICD-10-CM

## 2019-08-05 PROCEDURE — 93299 PR REM INTERROG ICPMS/SCRMS <30 D TECH REVIEW: CPT | Performed by: INTERNAL MEDICINE

## 2019-08-05 PROCEDURE — 93298 REM INTERROG DEV EVAL SCRMS: CPT | Performed by: INTERNAL MEDICINE

## 2019-08-05 NOTE — PROGRESS NOTES
Results for orders placed or performed in visit on 08/05/19   Cardiac EP device report    Narrative    MDT 25764 Alex Mendieta (LOOP): BATTERY STATUS "OK"  PRESENTING RHYTHM NSR, PAC @ 82 BPM  NO PATIENT ACTIVATED EPISODES  2 DEVICE AF EPISODES, BOTH WITH DURATION OF 2 MINS  AF BURDEN - 0%  ALL AVAIL ECG'S SHOW PROB  NSR, FREQUENT PAC'S, BRIEF PAT  AF SHOULD NOT BE EXCLUDED  PT ON ASA 81, CLOPIDOGREL  NORMAL DEVICE FUNCTION     EB

## 2019-09-12 ENCOUNTER — HOSPITAL ENCOUNTER (OUTPATIENT)
Dept: MAMMOGRAPHY | Facility: HOSPITAL | Age: 69
Discharge: HOME | End: 2019-09-12
Payer: MEDICARE

## 2019-09-12 VITALS — HEIGHT: 67 IN | WEIGHT: 187 LBS | BODY MASS INDEX: 29.35 KG/M2

## 2019-09-12 DIAGNOSIS — I63.019 CEREBROVASCULAR ACCIDENT (CVA) DUE TO THROMBOSIS OF VERTEBRAL ARTERY, UNSPECIFIED BLOOD VESSEL LATERALITY (HCC): Primary | ICD-10-CM

## 2019-09-12 DIAGNOSIS — E11.9 TYPE 2 DIABETES MELLITUS WITHOUT COMPLICATION, WITHOUT LONG-TERM CURRENT USE OF INSULIN (HCC): ICD-10-CM

## 2019-09-12 DIAGNOSIS — I63.9 CEREBROVASCULAR ACCIDENT (CVA), UNSPECIFIED MECHANISM (HCC): ICD-10-CM

## 2019-09-12 DIAGNOSIS — Z12.31 SCREENING MAMMOGRAM, ENCOUNTER FOR: ICD-10-CM

## 2019-09-12 DIAGNOSIS — E53.8 LOW SERUM VITAMIN B12: ICD-10-CM

## 2019-09-12 DIAGNOSIS — R56.9 SEIZURE (HCC): ICD-10-CM

## 2019-09-12 DIAGNOSIS — E55.9 VITAMIN D DEFICIENCY: ICD-10-CM

## 2019-09-12 DIAGNOSIS — I10 ESSENTIAL HYPERTENSION: ICD-10-CM

## 2019-09-12 DIAGNOSIS — E78.49 OTHER HYPERLIPIDEMIA: ICD-10-CM

## 2019-09-12 DIAGNOSIS — G31.84 MILD COGNITIVE IMPAIRMENT: ICD-10-CM

## 2019-09-12 PROCEDURE — 77067 SCR MAMMO BI INCL CAD: CPT

## 2019-09-12 RX ORDER — LEVETIRACETAM 750 MG/1
750 TABLET ORAL 2 TIMES DAILY
Qty: 180 TABLET | Refills: 1 | Status: SHIPPED | OUTPATIENT
Start: 2019-09-12 | End: 2020-05-15

## 2019-09-12 RX ORDER — MELATONIN
1000 DAILY
Qty: 90 TABLET | Refills: 1 | Status: SHIPPED | OUTPATIENT
Start: 2019-09-12 | End: 2020-05-15

## 2019-09-12 RX ORDER — DONEPEZIL HYDROCHLORIDE 10 MG/1
TABLET, FILM COATED ORAL
Qty: 90 TABLET | Refills: 1 | Status: SHIPPED | OUTPATIENT
Start: 2019-09-12 | End: 2020-05-15

## 2019-09-12 RX ORDER — LISINOPRIL 10 MG/1
10 TABLET ORAL DAILY
Qty: 90 TABLET | Refills: 1 | Status: SHIPPED | OUTPATIENT
Start: 2019-09-12 | End: 2020-05-15

## 2019-09-12 RX ORDER — CYCLOBENZAPRINE HCL 5 MG
5 TABLET ORAL
Qty: 30 TABLET | Refills: 1 | Status: CANCELLED | OUTPATIENT
Start: 2019-09-12

## 2019-09-12 RX ORDER — ASPIRIN 81 MG/1
81 TABLET, CHEWABLE ORAL DAILY
Qty: 90 TABLET | Refills: 1 | Status: SHIPPED | OUTPATIENT
Start: 2019-09-12 | End: 2020-05-15

## 2019-09-12 RX ORDER — ATORVASTATIN CALCIUM 80 MG/1
80 TABLET, FILM COATED ORAL DAILY
Qty: 90 TABLET | Refills: 1 | Status: SHIPPED | OUTPATIENT
Start: 2019-09-12 | End: 2020-05-15

## 2019-09-12 RX ORDER — CLOPIDOGREL BISULFATE 75 MG/1
75 TABLET ORAL DAILY
Qty: 90 TABLET | Refills: 1 | Status: SHIPPED | OUTPATIENT
Start: 2019-09-12 | End: 2020-05-15

## 2019-09-13 NOTE — TELEPHONE ENCOUNTER
Medications sent to pharmacy except for Flexeril 5 mg HS which we haven't discussed at previous visits- could you please reach out to the patient and ask her if she is still taking this? Thanks!

## 2019-09-27 ENCOUNTER — OFFICE VISIT (OUTPATIENT)
Dept: FAMILY MEDICINE CLINIC | Facility: CLINIC | Age: 69
End: 2019-09-27

## 2019-09-27 VITALS
RESPIRATION RATE: 12 BRPM | WEIGHT: 181.8 LBS | DIASTOLIC BLOOD PRESSURE: 80 MMHG | BODY MASS INDEX: 28.53 KG/M2 | TEMPERATURE: 99.1 F | HEIGHT: 67 IN | HEART RATE: 62 BPM | SYSTOLIC BLOOD PRESSURE: 150 MMHG

## 2019-09-27 DIAGNOSIS — R05.9 COUGH: ICD-10-CM

## 2019-09-27 DIAGNOSIS — R20.2 NUMBNESS AND TINGLING IN LEFT HAND: Primary | ICD-10-CM

## 2019-09-27 DIAGNOSIS — R20.0 NUMBNESS AND TINGLING IN LEFT HAND: Primary | ICD-10-CM

## 2019-09-27 DIAGNOSIS — M25.512 ACUTE PAIN OF LEFT SHOULDER: ICD-10-CM

## 2019-09-27 PROCEDURE — 99213 OFFICE O/P EST LOW 20 MIN: CPT | Performed by: FAMILY MEDICINE

## 2019-09-27 NOTE — PROGRESS NOTES
Assessment/Plan:    Cough  Cough with congestion  and rhinorrhea likely due to URI  Cough nonproductive and no shortness of breath  No fevers or chills or any signs to suspect anything more serious than URI  Supportive treatment at this time  Acute pain of left shoulder  Physical exam unremarkable  No trauma or historical events requiring any imaging  Recommended heating pack as well as Tylenol p r n  May also try over-the-counter topical medicines  Recommended no heavy lifting until pain goes away  Numbness and tingling in left hand  Phalen and Tinlen test negative however this does  not rule out carpal tunnel  Numbness and tingling of left hand only could possibly be due to carpal tunnel but patient does have diabetes and may be peripheral neuropathy  Will give wrist splint to be worn to help with numbness and tingling  Recommended follow-up if  Numbness/tingling unresolved after splint  Consider EMG if unresolved  Problem List Items Addressed This Visit        Other    Cough     Cough with congestion  and rhinorrhea likely due to URI  Cough nonproductive and no shortness of breath  No fevers or chills or any signs to suspect anything more serious than URI  Supportive treatment at this time  Numbness and tingling in left hand - Primary     Phalen and Tinlen test negative however this does  not rule out carpal tunnel  Numbness and tingling of left hand only could possibly be due to carpal tunnel but patient does have diabetes and may be peripheral neuropathy  Will give wrist splint to be worn to help with numbness and tingling  Recommended follow-up if  Numbness/tingling unresolved after splint  Consider EMG if unresolved  Relevant Medications    Elastic Bandages & Supports (WRIST SPLINT/COCK-UP/LEFT L) MISC    Acute pain of left shoulder     Physical exam unremarkable  No trauma or historical events requiring any imaging    Recommended heating pack as well as Tylenol p r n  May also try over-the-counter topical medicines  Recommended no heavy lifting until pain goes away  Subjective:      Patient ID: Zarina Hanna is a 71 y o  female  Lidia Harada is a 51-year-old female who presents to the clinic with her brother and sister-in-law for left shoulder pain and a cough  The cough started a few days ago  It is nonproductive  It is accompanied with a little bit of congestion and rhinorrhea  She has not had any fevers, chills, hemoptysis, shortness of breath , myalgias, ear pain, ear drainage, sore throat, abdominal pain nausea vomiting or diarrhea  She has not tried anything for the cough and says it is relatively the same and has not gotten worse  She describes the shoulder pain as an achy 4 to 5/10  She denies any trauma or heavy lifting in recent history  The pain comes and goes  She denies any weakness  However she does say she has numbness and tingling in some of her fingers  The numbness and tingling does not wake her up in the middle the night and she has not had any change in  strength  The following portions of the patient's history were reviewed and updated as appropriate: allergies, current medications, past family history, past medical history, past social history, past surgical history and problem list     Review of Systems   Constitutional: Negative for appetite change, chills and fever  HENT: Positive for rhinorrhea  Negative for ear discharge, ear pain and sore throat  Respiratory: Positive for cough  Gastrointestinal: Negative for abdominal pain, diarrhea, nausea and vomiting  Neurological: Negative for headaches  All other systems reviewed and are negative          Objective:      /80 (BP Location: Left arm, Patient Position: Sitting, Cuff Size: Large)   Pulse 62   Temp 99 1 °F (37 3 °C)   Resp 12   Ht 5' 7" (1 702 m)   Wt 82 5 kg (181 lb 12 8 oz)   BMI 28 47 kg/m²          Physical Exam Constitutional: She is oriented to person, place, and time  No distress  HENT:   Mouth/Throat: Oropharynx is clear and moist    Neck: Normal range of motion  Neck supple  No thyromegaly present  Cardiovascular: Normal rate, regular rhythm and normal heart sounds  Pulmonary/Chest: Effort normal    Musculoskeletal:   Phalen and tinel tests negative    Full range of motion of shoulder shoulder  Strength 5/5  No atrophy or physical abnormality  Neurological: She is alert and oriented to person, place, and time  Skin: She is not diaphoretic  Nursing note and vitals reviewed

## 2019-09-27 NOTE — ASSESSMENT & PLAN NOTE
Cough with congestion  and rhinorrhea likely due to URI  Cough nonproductive and no shortness of breath  No fevers or chills or any signs to suspect anything more serious than URI  Supportive treatment at this time

## 2019-09-27 NOTE — ASSESSMENT & PLAN NOTE
Phalen and Tinlen test negative however this does  not rule out carpal tunnel  Numbness and tingling of left hand only could possibly be due to carpal tunnel but patient does have diabetes and may be peripheral neuropathy  Will give wrist splint to be worn to help with numbness and tingling  Recommended follow-up if  Numbness/tingling unresolved after splint  Consider EMG if unresolved

## 2019-09-27 NOTE — ASSESSMENT & PLAN NOTE
Physical exam unremarkable  No trauma or historical events requiring any imaging  Recommended heating pack as well as Tylenol p r n  May also try over-the-counter topical medicines  Recommended no heavy lifting until pain goes away

## 2019-10-01 ENCOUNTER — TELEPHONE (OUTPATIENT)
Dept: FAMILY MEDICINE CLINIC | Facility: CLINIC | Age: 69
End: 2019-10-01

## 2019-10-01 NOTE — TELEPHONE ENCOUNTER
Will address at follow-up visit 10/29, we need to address diabetic foot condition/perform foot exam at that time prior to filling out the form

## 2019-10-07 ENCOUNTER — TELEPHONE (OUTPATIENT)
Dept: FAMILY MEDICINE CLINIC | Facility: CLINIC | Age: 69
End: 2019-10-07

## 2019-10-07 NOTE — TELEPHONE ENCOUNTER
Received request for diabetic shows from [address] [place] [address] Way, I have completed but have not found a diabetic foot exam in chart  In your bin  Thanks!

## 2019-10-07 NOTE — TELEPHONE ENCOUNTER
Updated that form is due 10/8, form completed with reason poor circulation, will perform diabetic foot exam at follow-up visit and well as continued podiatry follow-up

## 2019-10-24 ENCOUNTER — OFFICE VISIT (OUTPATIENT)
Dept: NEUROLOGY | Facility: CLINIC | Age: 69
End: 2019-10-24
Payer: MEDICARE

## 2019-10-24 VITALS
HEART RATE: 81 BPM | BODY MASS INDEX: 28.41 KG/M2 | WEIGHT: 181 LBS | HEIGHT: 67 IN | DIASTOLIC BLOOD PRESSURE: 74 MMHG | SYSTOLIC BLOOD PRESSURE: 144 MMHG

## 2019-10-24 DIAGNOSIS — Z86.73 HISTORY OF STROKE: Primary | ICD-10-CM

## 2019-10-24 DIAGNOSIS — E53.8 LOW SERUM VITAMIN B12: ICD-10-CM

## 2019-10-24 DIAGNOSIS — G40.909 SEIZURE DISORDER (HCC): ICD-10-CM

## 2019-10-24 DIAGNOSIS — G31.84 MCI (MILD COGNITIVE IMPAIRMENT): ICD-10-CM

## 2019-10-24 DIAGNOSIS — G25.0 TREMOR, ESSENTIAL: ICD-10-CM

## 2019-10-24 PROBLEM — I63.9 CVA (CEREBRAL VASCULAR ACCIDENT) (HCC): Status: RESOLVED | Noted: 2018-02-15 | Resolved: 2019-10-24

## 2019-10-24 PROCEDURE — 99214 OFFICE O/P EST MOD 30 MIN: CPT | Performed by: PHYSICIAN ASSISTANT

## 2019-10-24 RX ORDER — LANOLIN ALCOHOL/MO/W.PET/CERES
1000 CREAM (GRAM) TOPICAL DAILY
Qty: 30 TABLET | Refills: 11 | Status: SHIPPED | OUTPATIENT
Start: 2019-10-24 | End: 2020-04-15

## 2019-10-24 NOTE — PROGRESS NOTES
Patient ID: Eliezer Oropeza is a 71 y o  female  Assessment/Plan:    Tremor, essential  Currently stable  No treatment needed at this time  Seizure ([address] )  Pt has not had any seizures in the interval between her appointments  She will continue Keppra  Mild cognitive impairment  She will continue donepezil and continue to participate in activities at the [facility]  Low serum vitamin B12  She will start vitamin B12 1000mcg daily  History of stroke  She will continue Aspirin, Plavix and atorvastatin  Loop recorder to be monitored by Cardiology  Numbness and tingling in left hand  Discussed the possibility of an EMG with pt's family  They wish to hold off at this time as they are not sure how she would tolerate the test  Her symptoms will be monitored at the [facility]  Thirty minutes were spent with the patient gathering history, examining patient and formulating a treatment plan  Pt/family understood and were in agreement with the treatment plan  She will follow up in 1 year  Problem List Items Addressed This Visit        Nervous and Auditory    Tremor, essential     Currently stable  No treatment needed at this time  Other    Low serum vitamin B12     She will start vitamin B12 1000mcg daily  History of stroke - Primary     She will continue Aspirin, Plavix and atorvastatin  Loop recorder to be monitored by Cardiology  Relevant Medications    vitamin B-12 (VITAMIN B-12) 1,000 mcg tablet      Other Visit Diagnoses     Seizure disorder ([address] )        MCI (mild cognitive impairment)                 Subjective:    GAURAV    Masha Bowens presents for follow-up evaluation for multiple neurologic issues  To review, she is a 69yo woman    She does report a new problem today  She states that she has been having an aching pain that starts in the neck and travels down the Lt arm into the hand  It causes numbness of the Lt hand  She did try a brace on the Rt hand but it did not help   She denies any weakness of the Lt arm and hand  She denies any aggravating or alleviating interventions  She did try a menthol gel but is no longer allowed to have it at the [facility]  Seizure disorder:  She reports no new events concerning for recurrent TIA or seizure  She takes all her medication as prescribed as this is given to her through her assisted living facility  She denies any passing out spells  Essential tremor:  She continues to have a significant tremor in the office today however she reports that this is not overly bothersome to her  Mild cognitive impairment:  She reports ongoing frequent exercise in that there been no changes in her activities of daily living or independence  She continues to reside in an assisted living setting  She does word searches and is quite good them however she has a lot of difficulty with reading when use and reportedly issues with depth perception  This is likely related to her strokes  Stroke: In May of 2018 she had an MRI brain with neuroquant imaging: There may be 2 tiny foci of recent ischemia, one each in the right frontal and left parieto-occipital region  Diffuse generalized volume loss, advanced chronic large and small vessel ischemia  NeuroQuant analysis was performed: There does appear to be selective volume loss of the hippocampus and prominence of the temporal horn 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  This occurs in the face of diffuse chronic   large and small vessel ischemia  She has had a repeat CT angiogram which reveals basically resolution of the thrombus although 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, but no significant extracranial or intracranial stenosis that would explain her current stroke events  She had a transesophageal echocardiogram which did not reveal any significant structural heart disease  She did have a loop recorder placed after her last appt   She is scheduled to see Cardiology next week  She remains on ASA, Plavix and atorvastatin  There are no problems with the medications  She has not had any further stroke symptoms  The following portions of the patient's history were reviewed and updated as appropriate: allergies, current medications, past family history, past medical history, past social history, past surgical history and problem list          Objective:    Blood pressure 144/74, pulse 81, height 5' 7" (1 702 m), weight 82 1 kg (181 lb)  Physical Exam   Eyes: Pupils are equal, round, and reactive to light  Lids are normal    Neurological: She is alert  She has normal strength and normal reflexes  Coordination normal    Psychiatric: Her speech is normal    Vitals reviewed  Neurological Exam  Mental Status  Awake and alert  Oriented only to person  Speech is normal  Language is fluent with no aphasia  Attention and concentration are normal  Fund of knowledge is appropriate for level of education  Apraxia absent  Cranial Nerves  CN III, IV, VI: Extraocular movements intact bilaterally  Normal lids and orbits bilaterally  Pupils equal round and reactive to light bilaterally  CN V: Facial sensation is normal   CN VII: Full and symmetric facial movement  CN VIII: Hearing is normal   CN IX, X: Palate elevates symmetrically  Normal gag reflex  CN XI: Shoulder shrug strength is normal   CN XII: Tongue midline without atrophy or fasciculations  Decreased temporal and inferior nasal fields bilat  Motor   Strength is 5/5 throughout all four extremities  +head tremor  Sensory  Sensation is intact to light touch, pinprick, vibration and proprioception in all four extremities  Reflexes  Deep tendon reflexes are 2+ and symmetric in all four extremities with downgoing toes bilaterally  Coordination  Finger-to-nose, rapid alternating movements and heel-to-shin normal bilaterally without dysmetria      Gait  Normal casual, toe, heel and tandem gait         ROS:    Review of Systems   Constitutional: Negative  Negative for appetite change and fever  HENT: Negative  Negative for hearing loss, tinnitus, trouble swallowing and voice change  Eyes: Negative  Negative for photophobia and pain  Respiratory: Negative  Negative for shortness of breath  Cardiovascular: Negative  Negative for palpitations  Gastrointestinal: Negative  Negative for nausea and vomiting  Endocrine: Negative  Negative for cold intolerance and heat intolerance  Genitourinary: Negative  Negative for dysuria, frequency and urgency  Musculoskeletal: Negative  Negative for myalgias and neck pain (left side)  Skin: Negative  Negative for rash  Neurological: Negative  Negative for dizziness, tremors, seizures, syncope, facial asymmetry, speech difficulty, weakness, light-headedness, numbness (left hand) and headaches  Hematological: Negative  Does not bruise/bleed easily  Psychiatric/Behavioral: Negative  Negative for confusion, hallucinations and sleep disturbance  Review of systems personally reviewed

## 2019-10-24 NOTE — ASSESSMENT & PLAN NOTE
Discussed the possibility of an EMG with pt's family  They wish to hold off at this time as they are not sure how she would tolerate the test  Her symptoms will be monitored at the [facility]  Thirty minutes were spent with the patient gathering history, examining patient and formulating a treatment plan  Pt/family understood and were in agreement with the treatment plan  She will follow up in 1 year

## 2019-10-24 NOTE — PATIENT INSTRUCTIONS
Tremor, essential  Currently stable  No treatment needed at this time  Seizure ([facility] [address] )  Pt has not had any seizures in the interval between her appointments  She will continue Keppra  Mild cognitive impairment  She will continue donepezil and continue to participate in activities at the [facility]  Low serum vitamin B12  She will start vitamin B12 1000mcg daily  History of stroke  She will continue Aspirin, Plavix and atorvastatin  Loop recorder to be monitored by Cardiology  Numbness and tingling in left hand  Discussed the possibility of an EMG with pt's family  They wish to hold off at this time as they are not sure how she would tolerate the test  Her symptoms will be monitored at the [facility]  Thirty minutes were spent with the patient gathering history, examining patient and formulating a treatment plan  Pt/family understood and were in agreement with the treatment plan  She will follow up in 1 year

## 2019-10-29 ENCOUNTER — OFFICE VISIT (OUTPATIENT)
Dept: FAMILY MEDICINE CLINIC | Facility: CLINIC | Age: 69
End: 2019-10-29

## 2019-10-29 VITALS
SYSTOLIC BLOOD PRESSURE: 130 MMHG | TEMPERATURE: 98.3 F | DIASTOLIC BLOOD PRESSURE: 90 MMHG | RESPIRATION RATE: 16 BRPM | BODY MASS INDEX: 27.84 KG/M2 | HEART RATE: 78 BPM | HEIGHT: 67 IN | WEIGHT: 177.4 LBS

## 2019-10-29 DIAGNOSIS — R63.4 WEIGHT LOSS: Primary | ICD-10-CM

## 2019-10-29 DIAGNOSIS — E11.9 TYPE 2 DIABETES MELLITUS WITHOUT COMPLICATION, WITHOUT LONG-TERM CURRENT USE OF INSULIN (HCC): ICD-10-CM

## 2019-10-29 DIAGNOSIS — I10 ESSENTIAL HYPERTENSION: ICD-10-CM

## 2019-10-29 LAB — SL AMB POCT HEMOGLOBIN AIC: 5.7 (ref ?–6.5)

## 2019-10-29 PROCEDURE — 83036 HEMOGLOBIN GLYCOSYLATED A1C: CPT | Performed by: FAMILY MEDICINE

## 2019-10-29 PROCEDURE — 99213 OFFICE O/P EST LOW 20 MIN: CPT | Performed by: FAMILY MEDICINE

## 2019-10-29 NOTE — PROGRESS NOTES
Assessment/Plan:       Problem List Items Addressed This Visit        Endocrine    Type 2 diabetes mellitus without complication, without long-term current use of insulin ([facility] [address] )       Lab Results   Component Value Date    HGBA1C 5 7 10/29/2019     - A1c improved from 6 0 6/25/2019   - Continue metformin 500 mg daily   - Diabetic foot exam performed today, follows with podiatry and wears diabetic shoes (indication likely poor circulation considering prior strokes and chronic diabetes, palpable pulses bilaterally, high risk for potentially injuring foot/stubbing toe due to visual deficit/poor depth perception) following stroke         Relevant Orders    POCT hemoglobin A1c (Completed)       Cardiovascular and Mediastinum    Essential hypertension     - BP well-controlled, 130/90 today   - Continue lisinopril 10 mg daily             Other    Weight loss - Primary     - Significant weight loss over the past 6 months, unintentional although patient attributes to dislike of food at assisted living facility, subsequent decrease in appetite, as well as cutting down on sweets    - 4/2019: 204 lb   - 6/2019: 199 lb   - 9/2019: 181 lb   - today 177 lb  - Mammogram UTD   - Colonoscopy UTD (2018, follows with Dr Nena Meier)   - Family history of ovarian cancer in mom and Powers syndrome in niece    - GYN exam up to date, Pap negative with sample satisfactory for evaluation and HPV negative    - Patient previously reported to be established with a gynecologist who is addressing her family history of cancer and performing genetic testing, however today patient mentions she is unsure of the status of this, has difficulty tracking all of her doctors appointments: I discussed with the patient and her brother to check in on this, advised to call if they need any assistance with referrals or appointments   - Depression screening negative with PHq-9 score of 1 (for poor appetite) however patient does endorse rare passive thoughts about how she would be better off dead in the past, no active suicidal ideation    - Options for therapy discussed, patient declines at this time   - Unclear etiology at this time, differential includes malignancy, thyroid abnormality, depression, renal disease, diminished appetite due to polypharmacy versus environmental  - Will order further work-up with CBC, CMP, TSH and HIV   - Will continue to monitor closely at subsequent visits  - Return precautions discussed          Relevant Orders    CBC and differential    TSH, 3rd generation with Free T4 reflex    Comprehensive metabolic panel    HIV 1/2 AG-AB combo            Subjective:      Patient ID: Cecilia Augustine is a 71 y o  female with past medical history significant for CVA and seizures followed by neurology, non-insulin dependent type 2 DM, hypertension, hyperlipidemia and MCI presenting for follow-up of hypertension and to discuss weight loss  HPI   Weight loss- The patient is here with her brother today, brings up a concern from her assisted living facility that she has lost a lot of weight over the past couple of months  She has noted a recent loss of appetite, more so than usual, although admits since she moved into the [facility] she does not like the food, and she often will not eat a large portion of her meal  She is also cutting back on sweets due to her diabetes  She denies any fevers, chills, recent illness, night sweats  Denies any nausea, vomiting, abdominal pain, sometimes diarrhea depending on the foods she eats, no blood or dark stools  Diabetes- Tolerating medication without difficulty, continuing to work on her diet  Hypertension- Tolerating medication well, no complaints       The following portions of the patient's history were reviewed and updated as appropriate: allergies, current medications, past family history, past medical history, past social history, past surgical history and problem list     Review of Systems   Constitutional: Positive for appetite change and unexpected weight change  Negative for activity change, chills, fatigue and fever  HENT: Negative for congestion, sore throat, trouble swallowing and voice change  Eyes: Positive for visual disturbance (chronic)  Respiratory: Negative for cough and shortness of breath  Cardiovascular: Negative for chest pain  Gastrointestinal: Negative for abdominal pain, constipation, diarrhea, nausea and vomiting  Genitourinary: Negative for decreased urine volume  Skin: Negative for rash  Neurological: Positive for tremors (chronic)  Psychiatric/Behavioral: Negative for agitation, behavioral problems, decreased concentration, sleep disturbance and suicidal ideas  The patient is not nervous/anxious  Objective:      /90   Pulse 78   Temp 98 3 °F (36 8 °C)   Resp 16   Ht 5' 7" (1 702 m)   Wt 80 5 kg (177 lb 6 4 oz)   BMI 27 78 kg/m²     Wt Readings from Last 3 Encounters:   10/29/19 80 5 kg (177 lb 6 4 oz)   10/24/19 82 1 kg (181 lb)   09/27/19 82 5 kg (181 lb 12 8 oz)        Physical Exam   Constitutional: She is oriented to person, place, and time  She appears well-developed and well-nourished  No distress  HENT:   Head: Normocephalic and atraumatic  Eyes: Conjunctivae and EOM are normal  Right eye exhibits no discharge  Left eye exhibits no discharge  Neck: Normal range of motion  Neck supple  No thyromegaly present  Cardiovascular: Normal rate, regular rhythm, normal heart sounds and intact distal pulses  Pulses are no weak pulses  No murmur heard  Pulses:       Dorsalis pedis pulses are 2+ on the right side, and 2+ on the left side  Posterior tibial pulses are 2+ on the right side, and 2+ on the left side  Pulmonary/Chest: Effort normal and breath sounds normal  No stridor  No respiratory distress  She has no wheezes  She has no rales  Abdominal: Soft  Bowel sounds are normal  She exhibits no distension and no mass  There is no tenderness   There is no rebound and no guarding  Feet:   Right Foot:   Skin Integrity: Positive for warmth  Left Foot:   Skin Integrity: Positive for warmth  Lymphadenopathy:     She has no cervical adenopathy  Neurological: She is alert and oriented to person, place, and time  She exhibits normal muscle tone  Resting no-no tremor   Skin: Skin is warm  She is not diaphoretic  Psychiatric: She has a normal mood and affect  Her behavior is normal    Vitals reviewed  Patient's shoes and socks removed  Right Foot/Ankle   Right Foot Inspection  Skin Exam: skin intact and warmth no abnormal color                            Sensory       Monofilament testing: intact  Vascular    The right DP pulse is 2+  The right PT pulse is 2+  Left Foot/Ankle  Left Foot Inspection  Skin Exam: skin intact and warmthnormal color                                         Sensory       Monofilament: intact  Vascular    The left DP pulse is 2+  The left PT pulse is 2+  Assign Risk Category:  No deformity present; No loss of protective sensation;  No weak pulses       Risk: 0       Roger Duggan,  PGY-3   [place] [id number]

## 2019-10-30 NOTE — ASSESSMENT & PLAN NOTE
Lab Results   Component Value Date    HGBA1C 5 7 10/29/2019     - A1c improved from 6 0 6/25/2019   - Continue metformin 500 mg daily   - Diabetic foot exam performed today, follows with podiatry and wears diabetic shoes (indication likely poor circulation considering prior strokes and chronic diabetes, palpable pulses bilaterally, high risk for potentially injuring foot/stubbing toe due to visual deficit/poor depth perception) following stroke

## 2019-10-30 NOTE — ASSESSMENT & PLAN NOTE
- Significant weight loss over the past 6 months, unintentional although patient attributes to dislike of food at assisted living facility, subsequent decrease in appetite, as well as cutting down on sweets    - 4/2019: 204 lb   - 6/2019: 199 lb   - 9/2019: 181 lb   - today 177 lb  - Mammogram UTD   - Colonoscopy UTD (2018, follows with Dr Kylah Weller)   - Family history of ovarian cancer in mom and Powers syndrome in niece    - GYN exam up to date, Pap negative with sample satisfactory for evaluation and HPV negative    - Patient previously reported to be established with a gynecologist who is addressing her family history of cancer and performing genetic testing, however today patient mentions she is unsure of the status of this, has difficulty tracking all of her doctors appointments: I discussed with the patient and her brother to check in on this, advised to call if they need any assistance with referrals or appointments   - Depression screening negative with PHq-9 score of 1 (for poor appetite) however patient does endorse rare passive thoughts about how she would be better off dead in the past, no active suicidal ideation    - Options for therapy discussed, patient declines at this time   - Unclear etiology at this time, differential includes malignancy, thyroid abnormality, depression, renal disease, diminished appetite due to polypharmacy versus environmental  - Will order further work-up with CBC, CMP, TSH and HIV   - Will continue to monitor closely at subsequent visits  - Return precautions discussed

## 2019-11-06 ENCOUNTER — TELEPHONE (OUTPATIENT)
Dept: OTHER | Facility: HOSPITAL | Age: 69
End: 2019-11-06

## 2019-11-06 DIAGNOSIS — E87.6 HYPOKALEMIA: Primary | ICD-10-CM

## 2019-11-06 NOTE — TELEPHONE ENCOUNTER
Recent blood work reviewed 11/5, HIV negative, TSH normal 1 25, CBC unremarkable  CMP- potassium low 3 0, calcium low 8 1, albumin low 3 1, total protein low 5 6  Patient has an upcoming appointment with me 11/26, and will discuss in more detail then  In the mean time, please instruct the patient to eat more potassium containing foods (banana, sweet potato, avocado, potato, spinach, beans, orange, etc), as well as the following orders:     - Please have her take potassium 20 mEq daily for 3 days, sent to pharmacy    - BMP, aldosterone/renin ratio, and urine potassium with creatinine in 5 days 11/12    I reviewed her medication list, no obvious medication causes of hypokalemia  Will continue to address at follow-up visit 11/26 once we have repeat labs as well  Please let me know if she has any questions, thanks!

## 2019-11-08 ENCOUNTER — TELEPHONE (OUTPATIENT)
Dept: FAMILY MEDICINE CLINIC | Facility: CLINIC | Age: 69
End: 2019-11-08

## 2019-11-08 DIAGNOSIS — E87.6 HYPOKALEMIA: Primary | ICD-10-CM

## 2019-11-08 RX ORDER — POTASSIUM CHLORIDE 750 MG/1
20 CAPSULE, EXTENDED RELEASE ORAL DAILY
Qty: 6 CAPSULE | Refills: 0 | Status: SHIPPED | OUTPATIENT
Start: 2019-11-08 | End: 2019-12-16 | Stop reason: SDUPTHER

## 2019-11-08 NOTE — TELEPHONE ENCOUNTER
Patient is resident at [facility], tried speaking with [facility], closed  Since patient resides in facility should we be ordering something for potassium?

## 2019-11-26 ENCOUNTER — TELEPHONE (OUTPATIENT)
Dept: FAMILY MEDICINE CLINIC | Facility: CLINIC | Age: 69
End: 2019-11-26

## 2019-11-26 NOTE — TELEPHONE ENCOUNTER
Patient no-showed appointment today, could you please call her to reschedule and also to check in on her hypokalemia to make sure that was addressed? Thanks!

## 2019-11-26 NOTE — TELEPHONE ENCOUNTER
Spoke with sister who is POA, she states her parents provide transportation for visits and they must have forgotten  Rescheduled visit Tues 12/10/19, Faxed lab orders to [facility] for their lab service to do labs prior to visit

## 2019-12-10 ENCOUNTER — OFFICE VISIT (OUTPATIENT)
Dept: FAMILY MEDICINE CLINIC | Facility: CLINIC | Age: 69
End: 2019-12-10

## 2019-12-10 VITALS
HEIGHT: 67 IN | DIASTOLIC BLOOD PRESSURE: 80 MMHG | RESPIRATION RATE: 16 BRPM | WEIGHT: 176 LBS | HEART RATE: 78 BPM | SYSTOLIC BLOOD PRESSURE: 120 MMHG | BODY MASS INDEX: 27.62 KG/M2 | TEMPERATURE: 98 F

## 2019-12-10 DIAGNOSIS — R63.4 WEIGHT LOSS: ICD-10-CM

## 2019-12-10 DIAGNOSIS — E87.6 HYPOKALEMIA: Primary | ICD-10-CM

## 2019-12-10 PROCEDURE — 99213 OFFICE O/P EST LOW 20 MIN: CPT | Performed by: FAMILY MEDICINE

## 2019-12-10 NOTE — PROGRESS NOTES
Assessment/Plan:    No problem-specific Assessment & Plan notes found for this encounter  {Assess/PlanSmartLinks:13019}      Subjective:      Patient ID: Yuli Tellez is a 71 y o  female      HPI    {Common ambulatory SmartLinks:04990}    Review of Systems      Objective:      /80   Pulse 78   Temp 98 °F (36 7 °C)   Resp 16   Ht 5' 7" (1 702 m)   Wt 79 8 kg (176 lb)   BMI 27 57 kg/m²          Physical Exam

## 2019-12-10 NOTE — PROGRESS NOTES
Assessment/Plan:       Problem List Items Addressed This Visit        Other    Hypokalemia - Primary     - Potassium 3 0 11/5  - No obvious medication causes of hypokalemia  - Patient never received message about potassium supplementation or plan for blood work  - Will repeat BMP, aldosterone/renin ratio, and urine potassium with creatinine now   - Pending result, will start potassium supplementation          Relevant Orders    Basic metabolic panel    Aldosterone/Renin Ratio    Potassium W/Creatinine, Random Urine    Weight loss     - Significant weight loss over the past 6 months, unintentional although patient attributes to dislike of food at assisted living facility, subsequent decrease in appetite, as well as cutting down on sweets and following diabetic low-sugar diet at assisted living               - 4/2019: 204 lb              - 6/2019: 199 lb              - 9/2019: 181 lb              -  10/2019: 177 lb   - Today: 176 lb   - Mammogram UTD   - Colonoscopy UTD (2018, follows with Dr Marina Kern)   - Family history of ovarian cancer in mom and Powers syndrome in niece               - GYN exam up to date, Pap negative with sample satisfactory for evaluation and HPV negative               - Patient previously reported to be established with a gynecologist who is addressing her family history of cancer and performing genetic testing, however today patient mentions she is unsure of the status of this, has difficulty tracking all of her doctors appointments: I discussed with the patient and her brother to check in on this, advised to call if they need any assistance with referrals or appointments   - Recent blood work reviewed 11/5, HIV negative, TSH normal 1 25, CBC unremarkable   CMP- potassium low 3 0, calcium low 8 1, albumin low 3 1, total protein low 5 6  - Overall, weight stabilized and likely secondary to diabetic diet/dislike of assisted living food as this timing correlates with weight decrease  - Will continue to monitor at subsequent visits                  Subjective:      Patient ID: Lubna Nevarez is a 71 y o  female with past medical history significant for CVA and seizures followed by neurology, non-insulin dependent type 2 DM, hypertension, hyperlipidemia and MCI presenting for follow-up and to address hypokalemia  The patient reports no concerns or complaints today, she has been doing well  She is here with her brother and sister-in-law  She denies any recent chest pain, shortness of breath, palpitations, lightheadedness or muscle cramping  Review of Systems   Constitutional: Negative for activity change, appetite change, chills, fever and unexpected weight change  Respiratory: Negative for cough, chest tightness, shortness of breath and wheezing  Cardiovascular: Negative for chest pain and palpitations  Gastrointestinal: Negative for constipation  Musculoskeletal: Negative for myalgias  Skin: Negative for pallor and rash  Neurological: Negative for dizziness, light-headedness and headaches  All other systems reviewed and are negative  Objective:      /80   Pulse 78   Temp 98 °F (36 7 °C)   Resp 16   Ht 5' 7" (1 702 m)   Wt 79 8 kg (176 lb)   BMI 27 57 kg/m²          Physical Exam   Constitutional: She is oriented to person, place, and time  She appears well-developed and well-nourished  No distress  HENT:   Head: Normocephalic and atraumatic  Cardiovascular: Normal rate, regular rhythm and normal heart sounds  No murmur heard  Pulmonary/Chest: Breath sounds normal  No stridor  She has no wheezes  She has no rales  Neurological: She is alert and oriented to person, place, and time  She exhibits normal muscle tone  Resting no-no tremor   Skin: Skin is warm  She is not diaphoretic  Psychiatric: She has a normal mood and affect  Her behavior is normal    Vitals reviewed          Sofia Johnson,  PGY-3   Queenie Sosa

## 2019-12-10 NOTE — ASSESSMENT & PLAN NOTE
Most recent HbA1C is within goal, continue metformin 500mg PO q d , and maintain sugar free diet    Lab Results   Component Value Date    HGBA1C 5 7 10/29/2019

## 2019-12-10 NOTE — ASSESSMENT & PLAN NOTE
- Potassium 3 0 11/5  - No obvious medication causes of hypokalemia  - Patient never received message about potassium supplementation or plan for blood work  - Will repeat BMP, aldosterone/renin ratio, and urine potassium with creatinine now   - Pending result, will start potassium supplementation

## 2019-12-11 NOTE — ASSESSMENT & PLAN NOTE
- Significant weight loss over the past 6 months, unintentional although patient attributes to dislike of food at assisted living facility, subsequent decrease in appetite, as well as cutting down on sweets and following diabetic low-sugar diet at assisted living               - 4/2019: 204 lb              - 6/2019: 199 lb              - 9/2019: 181 lb              - 10/2019: 177 lb   - Today: 176 lb   - Mammogram UTD   - Colonoscopy UTD (2018, follows with Dr Christelle Clark)   - Family history of ovarian cancer in mom and Powers syndrome in niece               - GYN exam up to date, Pap negative with sample satisfactory for evaluation and HPV negative               - Patient previously reported to be established with a gynecologist who is addressing her family history of cancer and performing genetic testing, however today patient mentions she is unsure of the status of this, has difficulty tracking all of her doctors appointments: I discussed with the patient and her brother to check in on this, advised to call if they need any assistance with referrals or appointments   - Recent blood work reviewed 11/5, HIV negative, TSH normal 1 25, CBC unremarkable   CMP- potassium low 3 0, calcium low 8 1, albumin low 3 1, total protein low 5 6  - Overall, weight stabilized and likely secondary to diabetic diet/dislike of assisted living food as this timing correlates with weight decrease  - Will continue to monitor at subsequent visits

## 2019-12-16 ENCOUNTER — TELEPHONE (OUTPATIENT)
Dept: FAMILY MEDICINE CLINIC | Facility: CLINIC | Age: 69
End: 2019-12-16

## 2019-12-16 DIAGNOSIS — E87.6 HYPOKALEMIA: Primary | ICD-10-CM

## 2019-12-16 RX ORDER — POTASSIUM CHLORIDE 20 MEQ/1
20 TABLET, EXTENDED RELEASE ORAL DAILY
Qty: 30 TABLET | Refills: 0 | Status: SHIPPED | OUTPATIENT
Start: 2019-12-16 | End: 2019-12-17 | Stop reason: ALTCHOICE

## 2019-12-16 NOTE — LETTER
December 18, 2019     Patient: Kojo Hernandez   YOB: 1950   Date of Visit: 12/10/2019       To Whom it May Concern:    Kojo Hernandez is under my professional care  Due to low potassium level, I recommend changing her diet order to a high potassium diet in addition to diabetic diet  She will take potassium chloride 20 mEq daily for 3 days, and we will repeat her BMP in 5 days  If you have any questions or concerns, please don't hesitate to call           Sincerely,          Marilou Joshua DO        CC: No Recipients

## 2019-12-17 NOTE — TELEPHONE ENCOUNTER
Spoke with Arun N Thurmont Trl at [facility]  They will need an official order for the Potasssium and order for BMP faxed to [phone]

## 2019-12-17 NOTE — TELEPHONE ENCOUNTER
Recent blood work reviewed to work-up hypokalemia, urine potassium low 18 1, urine creatinine normal 155, aldosterone/renin ratio normal <10 0  Repeat potassium low 3 2  Urine potassium to creatinine ratio low 0 116  Likely hypokalemia related to poor PO potassium intake, no obvious medication causes, no diarrhea or vomiting  Will start 20 mEq KCl daily and recheck BMP in 1 week  Please relay this to the patient's assisted living, and let me know if they have any questions  Thanks!

## 2019-12-17 NOTE — TELEPHONE ENCOUNTER
Patricia Cotto I can take care of it when I'm in the office Thursday afternoon, do they have an order form that we can fill out or do we send it to the pharmacy? Or do we fax a printed script for the med and blood work- I could do that while in the hospital tomorrow? I discussed her case with Dr Florentin Dang today the plan will be to give her potassium supplementation 20 mEq daily for 3 days and then recheck her BMP in 5 days  I called and canceled the order I sent to the pharmacy for KCl  Will also write recommendation to increase potassium in her diet

## 2019-12-18 RX ORDER — POTASSIUM CHLORIDE 20 MEQ/1
20 TABLET, EXTENDED RELEASE ORAL DAILY
Qty: 3 TABLET | Refills: 0 | Status: SHIPPED | OUTPATIENT
Start: 2019-12-18 | End: 2019-12-18 | Stop reason: SDUPTHER

## 2019-12-18 RX ORDER — POTASSIUM CHLORIDE 20 MEQ/1
20 TABLET, EXTENDED RELEASE ORAL DAILY
Qty: 3 TABLET | Refills: 0 | Status: SHIPPED | OUTPATIENT
Start: 2019-12-18 | End: 2022-02-07

## 2019-12-18 NOTE — TELEPHONE ENCOUNTER
Order placed for potassium chloride 20 mEq daily for 3 days, repeat BMP in 5 days  I printed the order form for the BMP and I printed proof that I prescribed the potassium for her but it's not letting me print the actual prescription to sign it from the hospital computer  Could one of the other residents or attendings in clinic today prescribe and sign the printed order and fax it over? I also typed a letter explaining the plan and recommending high potassium, diabetic diet  I faxed everything to them, so they should just need the actual KCl prescription  Thanks!

## 2019-12-18 NOTE — TELEPHONE ENCOUNTER
We do not have formal order form, I suggest faxing printed script and lab order dated with expected lab draw date

## 2019-12-23 ENCOUNTER — TELEPHONE (OUTPATIENT)
Dept: FAMILY MEDICINE CLINIC | Facility: CLINIC | Age: 69
End: 2019-12-23

## 2019-12-23 NOTE — TELEPHONE ENCOUNTER
Recent BMP reviewed, potassium normal 3 5  Will continue to monitor at subsequent visits, continue increased potassium in diet as previously recommended  Could you please let her know, and let me know if she has any questions  Thanks!

## 2020-03-09 NOTE — PROGRESS NOTES
Assessment/Plan:       Problem List Items Addressed This Visit        Endocrine    Type 2 diabetes mellitus without complication, without long-term current use of insulin ([facility] [address] ) - Primary       Lab Results   Component Value Date    HGBA1C 5 8 03/13/2020       - A1c 5 8, well-controlled and stable, goal <7% given prior stroke history   - Continue metformin 500 mg daily   - Advised to watch out for hypoglycemia symptoms, may opt to discontinue metformin next visit  - Repeat A1c 3 months   - Diabetic foot exam UTD   - Diabetic IRIS exam today   - Urine microalbumin ordered          Relevant Orders    POCT hemoglobin A1c (Completed)    Lipid Panel with Direct LDL reflex    Comprehensive metabolic panel    Microalbumin / creatinine urine ratio    IRIS Diabetic eye exam (Completed)       Cardiovascular and Mediastinum    Essential hypertension     - BP well-controlled, 118/70 today   - Continue lisinopril 10 mg daily             Other    HLD (hyperlipidemia)     - Lipid panel prior to next visit  - Continue Lipitor 80 mg daily             Hypokalemia     - Prior urine potassium low 18 1, urine creatinine normal 155, aldosterone/renin ratio normal <10 0  - Hypokalemia resolved most recent K 3 5, likely related to poor PO potassium intake   - Continue diet with potassium rich foods   - CMP prior to next visit          Weight loss     Wt Readings from Last 3 Encounters:   03/12/20 78 7 kg (173 lb 6 4 oz)   12/10/19 79 8 kg (176 lb)   10/29/19 80 5 kg (177 lb 6 4 oz)     - Weight has stabilized, 173 today from 176 last visit  - Prior work-up unremarkable, likely secondary to compliance with diabetic diet and dislike of assisted living facility food   - Will continue to monitor closely at subsequent visits            Other Visit Diagnoses     Screening for diabetic retinopathy              BMI Counseling: Body mass index is 27 16 kg/m²   The BMI is above normal  Nutrition recommendations include reducing portion sizes, decreasing overall calorie intake, 3-5 servings of fruits/vegetables daily, consuming healthier snacks and moderation in carbohydrate intake  Subjective:      Patient ID: Gabino Parks is a 71 y o  female with past medical history significant for CVA and seizures followed by neurology, non-insulin dependent type 2 DM, hypertension, hyperlipidemia and MCI presenting with her brother and sister-in-law for follow-up of chronic conditions  HPI     Diabetes- Tolerating the medication well, admits to rare episodes where she will feel lightheaded, occurs when she has not eaten for awhile  Weight loss- No concerns per patient, has not noticed a difference in her weight recently  Hypertension- Tolerating the medication well, denies any chest pain or shortness of breath  The following portions of the patient's history were reviewed and updated as appropriate: allergies, current medications, past family history, past medical history, past social history, past surgical history and problem list     Review of Systems   Constitutional: Negative for activity change, appetite change, chills and fever  Respiratory: Negative for cough and shortness of breath  Cardiovascular: Negative for chest pain, palpitations and leg swelling  Gastrointestinal: Negative for abdominal pain, constipation, diarrhea, nausea and vomiting  Genitourinary: Negative for decreased urine volume and difficulty urinating  Neurological: Positive for light-headedness (Rare episodes )  Negative for dizziness and headaches  Objective:      /70 (BP Location: Right arm, Patient Position: Sitting, Cuff Size: Large)   Pulse 76   Temp 98 4 °F (36 9 °C) (Tympanic)   Ht 5' 7" (1 702 m)   Wt 78 7 kg (173 lb 6 4 oz)   BMI 27 16 kg/m²          Physical Exam   Constitutional: She is oriented to person, place, and time  She appears well-developed and well-nourished  No distress  HENT:   Head: Normocephalic and atraumatic     Neck: Neck supple  Cardiovascular: Normal rate, regular rhythm and normal heart sounds  No murmur heard  Pulmonary/Chest: Effort normal and breath sounds normal  No stridor  No respiratory distress  She has no wheezes  She has no rales  Musculoskeletal: She exhibits no edema  Neurological: She is alert and oriented to person, place, and time  Resting no-no tremor   Skin: Skin is warm  She is not diaphoretic  Psychiatric: She has a normal mood and affect  Her behavior is normal    Vitals reviewed          Deborah Guillermo DO PGY-3   Queenie Sosa

## 2020-03-12 ENCOUNTER — OFFICE VISIT (OUTPATIENT)
Dept: FAMILY MEDICINE CLINIC | Facility: CLINIC | Age: 70
End: 2020-03-12

## 2020-03-12 VITALS
BODY MASS INDEX: 27.21 KG/M2 | DIASTOLIC BLOOD PRESSURE: 70 MMHG | TEMPERATURE: 98.4 F | HEIGHT: 67 IN | HEART RATE: 76 BPM | SYSTOLIC BLOOD PRESSURE: 118 MMHG | WEIGHT: 173.4 LBS

## 2020-03-12 DIAGNOSIS — I10 ESSENTIAL HYPERTENSION: ICD-10-CM

## 2020-03-12 DIAGNOSIS — R63.4 WEIGHT LOSS: ICD-10-CM

## 2020-03-12 DIAGNOSIS — Z13.5 SCREENING FOR DIABETIC RETINOPATHY: ICD-10-CM

## 2020-03-12 DIAGNOSIS — E11.9 TYPE 2 DIABETES MELLITUS WITHOUT COMPLICATION, WITHOUT LONG-TERM CURRENT USE OF INSULIN (HCC): Primary | ICD-10-CM

## 2020-03-12 DIAGNOSIS — E78.49 OTHER HYPERLIPIDEMIA: ICD-10-CM

## 2020-03-12 DIAGNOSIS — E87.6 HYPOKALEMIA: ICD-10-CM

## 2020-03-12 LAB
LEFT EYE DIABETIC RETINOPATHY: NORMAL
LEFT EYE IMAGE QUALITY: NORMAL
LEFT EYE MACULAR EDEMA: NORMAL
LEFT EYE OTHER RETINOPATHY: NORMAL
RIGHT EYE DIABETIC RETINOPATHY: NORMAL
RIGHT EYE IMAGE QUALITY: NORMAL
RIGHT EYE MACULAR EDEMA: NORMAL
RIGHT EYE OTHER RETINOPATHY: NORMAL
SEVERITY (EYE EXAM): NORMAL

## 2020-03-12 PROCEDURE — 99213 OFFICE O/P EST LOW 20 MIN: CPT | Performed by: FAMILY MEDICINE

## 2020-03-12 PROCEDURE — 83036 HEMOGLOBIN GLYCOSYLATED A1C: CPT | Performed by: FAMILY MEDICINE

## 2020-03-12 PROCEDURE — 2022F DILAT RTA XM EVC RTNOPTHY: CPT | Performed by: FAMILY MEDICINE

## 2020-03-12 PROCEDURE — 3074F SYST BP LT 130 MM HG: CPT | Performed by: FAMILY MEDICINE

## 2020-03-12 PROCEDURE — 1160F RVW MEDS BY RX/DR IN RCRD: CPT | Performed by: FAMILY MEDICINE

## 2020-03-12 PROCEDURE — 3044F HG A1C LEVEL LT 7.0%: CPT | Performed by: FAMILY MEDICINE

## 2020-03-12 PROCEDURE — 4040F PNEUMOC VAC/ADMIN/RCVD: CPT | Performed by: FAMILY MEDICINE

## 2020-03-12 PROCEDURE — 1036F TOBACCO NON-USER: CPT | Performed by: FAMILY MEDICINE

## 2020-03-12 PROCEDURE — 3008F BODY MASS INDEX DOCD: CPT | Performed by: FAMILY MEDICINE

## 2020-03-12 PROCEDURE — 3078F DIAST BP <80 MM HG: CPT | Performed by: FAMILY MEDICINE

## 2020-03-13 LAB — SL AMB POCT HEMOGLOBIN AIC: 5.8 (ref ?–6.5)

## 2020-03-13 NOTE — ASSESSMENT & PLAN NOTE
Wt Readings from Last 3 Encounters:   03/12/20 78 7 kg (173 lb 6 4 oz)   12/10/19 79 8 kg (176 lb)   10/29/19 80 5 kg (177 lb 6 4 oz)     - Weight has stabilized, 173 today from 176 last visit  - Prior work-up unremarkable, likely secondary to compliance with diabetic diet and dislike of assisted living facility food   - Will continue to monitor closely at subsequent visits

## 2020-03-13 NOTE — ASSESSMENT & PLAN NOTE
Lab Results   Component Value Date    HGBA1C 5 8 03/13/2020       - A1c 5 8, well-controlled and stable, goal <7% given prior stroke history   - Continue metformin 500 mg daily   - Advised to watch out for hypoglycemia symptoms, may opt to discontinue metformin next visit  - Repeat A1c 3 months   - Diabetic foot exam UTD   - Diabetic IRIS exam today   - Urine microalbumin ordered

## 2020-03-13 NOTE — ASSESSMENT & PLAN NOTE
- Prior urine potassium low 18 1, urine creatinine normal 155, aldosterone/renin ratio normal <10 0  - Hypokalemia resolved most recent K 3 5, likely related to poor PO potassium intake   - Continue diet with potassium rich foods   - CMP prior to next visit

## 2020-03-16 ENCOUNTER — TELEPHONE (OUTPATIENT)
Dept: FAMILY MEDICINE CLINIC | Facility: CLINIC | Age: 70
End: 2020-03-16

## 2020-03-16 NOTE — TELEPHONE ENCOUNTER
Recent blood work looks good- potassium 3 5, no changes needed, continue potassium-rich diet  Her calcium, albumin, protein is slightly low, will discuss testing pre-albumin and possibly starting Ensure next visit as well as discontinuing metformin, continue to encourage PO diet  Cholesterol well controlled on statin, LDL low 43, next visit will consider decreasing statin  She already has an appointment 6/9, thanks!

## 2020-03-18 ENCOUNTER — TELEPHONE (OUTPATIENT)
Dept: FAMILY MEDICINE CLINIC | Facility: CLINIC | Age: 70
End: 2020-03-18

## 2020-03-18 NOTE — TELEPHONE ENCOUNTER
Urine albumin result reviewed, elevated 2 9- would you mind reaching out to the lab to make sure they are going to run complete urine microalbumin/creatinine ratio? Thanks!

## 2020-03-25 NOTE — TELEPHONE ENCOUNTER
Lab did not perform Microalbumin/creatinine urine, do you want this done now or is this something that can wait?

## 2020-04-15 DIAGNOSIS — Z86.73 HISTORY OF STROKE: ICD-10-CM

## 2020-04-15 RX ORDER — CYCLOBENZAPRINE HCL 5 MG
TABLET ORAL
Qty: 30 TABLET | Refills: 3 | OUTPATIENT
Start: 2020-04-15

## 2020-04-15 RX ORDER — LANOLIN ALCOHOL/MO/W.PET/CERES
CREAM (GRAM) TOPICAL
Qty: 30 TABLET | Refills: 3 | Status: SHIPPED | OUTPATIENT
Start: 2020-04-15 | End: 2020-07-17

## 2020-05-15 DIAGNOSIS — R56.9 SEIZURE (HCC): ICD-10-CM

## 2020-05-15 DIAGNOSIS — I63.019 CEREBROVASCULAR ACCIDENT (CVA) DUE TO THROMBOSIS OF VERTEBRAL ARTERY, UNSPECIFIED BLOOD VESSEL LATERALITY (HCC): ICD-10-CM

## 2020-05-15 DIAGNOSIS — E55.9 VITAMIN D DEFICIENCY: ICD-10-CM

## 2020-05-15 DIAGNOSIS — G31.84 MILD COGNITIVE IMPAIRMENT: ICD-10-CM

## 2020-05-15 DIAGNOSIS — I10 ESSENTIAL HYPERTENSION: ICD-10-CM

## 2020-05-15 DIAGNOSIS — E78.49 OTHER HYPERLIPIDEMIA: ICD-10-CM

## 2020-05-15 DIAGNOSIS — I63.9 CEREBROVASCULAR ACCIDENT (CVA), UNSPECIFIED MECHANISM (HCC): ICD-10-CM

## 2020-05-15 DIAGNOSIS — E11.9 TYPE 2 DIABETES MELLITUS WITHOUT COMPLICATION, WITHOUT LONG-TERM CURRENT USE OF INSULIN (HCC): ICD-10-CM

## 2020-05-15 RX ORDER — LISINOPRIL 10 MG/1
10 TABLET ORAL DAILY
Qty: 90 TABLET | Refills: 1 | Status: SHIPPED | OUTPATIENT
Start: 2020-05-15 | End: 2020-11-16 | Stop reason: SDUPTHER

## 2020-05-15 RX ORDER — DONEPEZIL HYDROCHLORIDE 10 MG/1
TABLET, FILM COATED ORAL
Qty: 90 TABLET | Refills: 1 | Status: SHIPPED | OUTPATIENT
Start: 2020-05-15 | End: 2020-11-16 | Stop reason: SDUPTHER

## 2020-05-15 RX ORDER — LEVETIRACETAM 750 MG/1
750 TABLET ORAL 2 TIMES DAILY
Qty: 180 TABLET | Refills: 1 | Status: SHIPPED | OUTPATIENT
Start: 2020-05-15 | End: 2020-11-16 | Stop reason: SDUPTHER

## 2020-05-15 RX ORDER — CLOPIDOGREL BISULFATE 75 MG/1
TABLET ORAL
Qty: 90 TABLET | Refills: 1 | Status: SHIPPED | OUTPATIENT
Start: 2020-05-15 | End: 2020-11-16 | Stop reason: SDUPTHER

## 2020-05-15 RX ORDER — ATORVASTATIN CALCIUM 80 MG/1
80 TABLET, FILM COATED ORAL DAILY
Qty: 90 TABLET | Refills: 1 | Status: SHIPPED | OUTPATIENT
Start: 2020-05-15 | End: 2020-11-16 | Stop reason: SDUPTHER

## 2020-05-15 RX ORDER — ASPIRIN 81 MG/1
81 TABLET, CHEWABLE ORAL DAILY
Qty: 90 TABLET | Refills: 1 | Status: SHIPPED | OUTPATIENT
Start: 2020-05-15 | End: 2020-11-16 | Stop reason: SDUPTHER

## 2020-05-15 RX ORDER — PSYLLIUM HUSK 0.4 G
CAPSULE ORAL
Qty: 90 TABLET | Refills: 1 | Status: SHIPPED | OUTPATIENT
Start: 2020-05-15 | End: 2020-11-16 | Stop reason: SDUPTHER

## 2020-07-15 DIAGNOSIS — Z86.73 HISTORY OF STROKE: ICD-10-CM

## 2020-07-17 RX ORDER — LANOLIN ALCOHOL/MO/W.PET/CERES
CREAM (GRAM) TOPICAL
Qty: 30 TABLET | Refills: 3 | Status: SHIPPED | OUTPATIENT
Start: 2020-07-17 | End: 2020-11-16 | Stop reason: SDUPTHER

## 2020-08-18 ENCOUNTER — REMOTE DEVICE CLINIC VISIT (OUTPATIENT)
Dept: CARDIOLOGY CLINIC | Facility: CLINIC | Age: 70
End: 2020-08-18
Payer: MEDICARE

## 2020-08-18 DIAGNOSIS — Z95.818 PRESENCE OF OTHER CARDIAC IMPLANTS AND GRAFTS: Primary | ICD-10-CM

## 2020-08-18 PROCEDURE — 93298 REM INTERROG DEV EVAL SCRMS: CPT | Performed by: INTERNAL MEDICINE

## 2020-08-18 PROCEDURE — G2066 INTER DEVC REMOTE 30D: HCPCS | Performed by: INTERNAL MEDICINE

## 2020-08-18 NOTE — PROGRESS NOTES
Results for orders placed or performed in visit on 08/18/20   Cardiac EP device report    Narrative    MDT LOOP  CARELINK TRANSMISSION: BATTERY STATUS "OK"  1 TACHY & 14 AF NOTED  0% AT/AF BURDEN  AVAIL EGRAMS PRESENT AS STACH W/PACS, PVCS  PT ON ASA  EF 60% (2017)  NO PATIENT ACTIVATED EPISODES  NORMAL DEVICE FUNCTION   NC       Current Outpatient Medications:     aspirin 81 mg chewable tablet, CHEW 1 TABLET (81 MG TOTAL) DAILY, Disp: 90 tablet, Rfl: 1    atorvastatin (LIPITOR) 80 mg tablet, TAKE 1 TABLET (80 MG TOTAL) BY MOUTH DAILY, Disp: 90 tablet, Rfl: 1    Blood Pressure Monitor MISC, by Does not apply route daily, Disp: 1 each, Rfl: 0    Blood Pressure Monitoring (BLOOD PRESSURE CUFF) MISC, by Does not apply route daily, Disp: 1 each, Rfl: 0    clopidogrel (PLAVIX) 75 mg tablet, TAKE 1 TABLET (75 MG TOTAL) BY MOUTH DAILY, Disp: 90 tablet, Rfl: 1    cyclobenzaprine (FLEXERIL) 5 mg tablet, Take 1 tablet by mouth, Disp: , Rfl:     donepezil (ARICEPT) 10 mg tablet, TAKE ONE TABLET BY MOUTH EVERY DAY, Disp: 90 tablet, Rfl: 1    Elastic Bandages & Supports (WRIST SPLINT/COCK-UP/LEFT L) MISC, by Does not apply route daily, Disp: 1 each, Rfl: 0    levETIRAcetam (KEPPRA) 750 mg tablet, TAKE 1 TABLET (750 MG TOTAL) BY MOUTH 2 (TWO) TIMES A DAY, Disp: 180 tablet, Rfl: 1    lisinopril (ZESTRIL) 10 mg tablet, TAKE 1 TABLET (10 MG TOTAL) BY MOUTH DAILY, Disp: 90 tablet, Rfl: 1    metFORMIN (GLUCOPHAGE) 500 mg tablet, TAKE 1 TABLET (500 MG TOTAL) BY MOUTH DAILY WITH BREAKFAST, Disp: 90 tablet, Rfl: 1    potassium chloride (K-DUR,KLOR-CON) 20 mEq tablet, Take 1 tablet (20 mEq total) by mouth daily for 3 days, Disp: 3 tablet, Rfl: 0    vitamin B-12 (VITAMIN B-12) 1,000 mcg tablet, TAKE ONE TABLET BY MOUTH EVERY DAY, Disp: 30 tablet, Rfl: 3    VITAMIN D-1000 MAX ST 25 MCG (1000 UT) tablet, TAKE 1 TABLET (1,000 UNITS TOTAL) BY MOUTH DAILY, Disp: 90 tablet, Rfl: 1

## 2020-10-28 ENCOUNTER — TELEPHONE (OUTPATIENT)
Dept: NEUROLOGY | Facility: CLINIC | Age: 70
End: 2020-10-28

## 2020-11-05 ENCOUNTER — REMOTE DEVICE CLINIC VISIT (OUTPATIENT)
Dept: CARDIOLOGY CLINIC | Facility: CLINIC | Age: 70
End: 2020-11-05
Payer: MEDICARE

## 2020-11-05 DIAGNOSIS — Z95.818 PRESENCE OF OTHER CARDIAC IMPLANTS AND GRAFTS: Primary | ICD-10-CM

## 2020-11-05 PROCEDURE — 93298 REM INTERROG DEV EVAL SCRMS: CPT | Performed by: INTERNAL MEDICINE

## 2020-11-05 PROCEDURE — G2066 INTER DEVC REMOTE 30D: HCPCS | Performed by: INTERNAL MEDICINE

## 2020-11-16 DIAGNOSIS — E11.9 TYPE 2 DIABETES MELLITUS WITHOUT COMPLICATION, WITHOUT LONG-TERM CURRENT USE OF INSULIN (HCC): ICD-10-CM

## 2020-11-16 DIAGNOSIS — R56.9 SEIZURE (HCC): ICD-10-CM

## 2020-11-16 DIAGNOSIS — G31.84 MILD COGNITIVE IMPAIRMENT: ICD-10-CM

## 2020-11-16 DIAGNOSIS — I10 ESSENTIAL HYPERTENSION: ICD-10-CM

## 2020-11-16 DIAGNOSIS — E78.49 OTHER HYPERLIPIDEMIA: ICD-10-CM

## 2020-11-16 DIAGNOSIS — Z86.73 HISTORY OF STROKE: ICD-10-CM

## 2020-11-16 DIAGNOSIS — E55.9 VITAMIN D DEFICIENCY: ICD-10-CM

## 2020-11-16 DIAGNOSIS — I63.9 CEREBROVASCULAR ACCIDENT (CVA), UNSPECIFIED MECHANISM (HCC): ICD-10-CM

## 2020-11-16 DIAGNOSIS — I63.019 CEREBROVASCULAR ACCIDENT (CVA) DUE TO THROMBOSIS OF VERTEBRAL ARTERY, UNSPECIFIED BLOOD VESSEL LATERALITY (HCC): ICD-10-CM

## 2020-11-16 DIAGNOSIS — M25.512 ACUTE PAIN OF LEFT SHOULDER: Primary | ICD-10-CM

## 2020-11-16 RX ORDER — ASPIRIN 81 MG/1
81 TABLET, CHEWABLE ORAL DAILY
Qty: 90 TABLET | Refills: 1 | Status: SHIPPED | OUTPATIENT
Start: 2020-11-16 | End: 2020-12-13

## 2020-11-16 RX ORDER — ATORVASTATIN CALCIUM 80 MG/1
80 TABLET, FILM COATED ORAL DAILY
Qty: 90 TABLET | Refills: 1 | Status: SHIPPED | OUTPATIENT
Start: 2020-11-16 | End: 2020-12-13

## 2020-11-16 RX ORDER — CLOPIDOGREL BISULFATE 75 MG/1
75 TABLET ORAL DAILY
Qty: 90 TABLET | Refills: 1 | Status: SHIPPED | OUTPATIENT
Start: 2020-11-16 | End: 2020-12-09

## 2020-11-16 RX ORDER — MELATONIN
Qty: 30 TABLET | Refills: 1 | OUTPATIENT
Start: 2020-11-16

## 2020-11-16 RX ORDER — LEVETIRACETAM 750 MG/1
750 TABLET ORAL 2 TIMES DAILY
Qty: 180 TABLET | Refills: 1 | Status: SHIPPED | OUTPATIENT
Start: 2020-11-16 | End: 2020-12-13

## 2020-11-16 RX ORDER — CYCLOBENZAPRINE HCL 5 MG
TABLET ORAL
Qty: 30 TABLET | Refills: 3 | OUTPATIENT
Start: 2020-11-16

## 2020-11-16 RX ORDER — LANOLIN ALCOHOL/MO/W.PET/CERES
CREAM (GRAM) TOPICAL
Qty: 30 TABLET | Refills: 5 | OUTPATIENT
Start: 2020-11-16

## 2020-11-16 RX ORDER — LANOLIN ALCOHOL/MO/W.PET/CERES
1000 CREAM (GRAM) TOPICAL DAILY
Qty: 30 TABLET | Refills: 3 | Status: SHIPPED | OUTPATIENT
Start: 2020-11-16 | End: 2020-12-13

## 2020-11-16 RX ORDER — LEVETIRACETAM 750 MG/1
750 TABLET ORAL 2 TIMES DAILY
Qty: 60 TABLET | Refills: 1 | OUTPATIENT
Start: 2020-11-16

## 2020-11-16 RX ORDER — LISINOPRIL 10 MG/1
10 TABLET ORAL DAILY
Qty: 30 TABLET | Refills: 1 | OUTPATIENT
Start: 2020-11-16

## 2020-11-16 RX ORDER — ATORVASTATIN CALCIUM 80 MG/1
80 TABLET, FILM COATED ORAL DAILY
Qty: 30 TABLET | Refills: 1 | OUTPATIENT
Start: 2020-11-16

## 2020-11-16 RX ORDER — CLOPIDOGREL BISULFATE 75 MG/1
TABLET ORAL
Qty: 30 TABLET | Refills: 1 | OUTPATIENT
Start: 2020-11-16

## 2020-11-16 RX ORDER — CYCLOBENZAPRINE HCL 5 MG
5 TABLET ORAL
Qty: 90 TABLET | Refills: 0 | Status: SHIPPED | OUTPATIENT
Start: 2020-11-16 | End: 2020-12-13

## 2020-11-16 RX ORDER — DONEPEZIL HYDROCHLORIDE 10 MG/1
TABLET, FILM COATED ORAL
Qty: 30 TABLET | Refills: 1 | OUTPATIENT
Start: 2020-11-16

## 2020-11-16 RX ORDER — PSYLLIUM HUSK 0.4 G
1000 CAPSULE ORAL DAILY
Qty: 90 TABLET | Refills: 1 | Status: SHIPPED | OUTPATIENT
Start: 2020-11-16 | End: 2020-12-13

## 2020-11-16 RX ORDER — DONEPEZIL HYDROCHLORIDE 10 MG/1
TABLET, FILM COATED ORAL
Qty: 90 TABLET | Refills: 1 | Status: SHIPPED | OUTPATIENT
Start: 2020-11-16 | End: 2020-12-13

## 2020-11-16 RX ORDER — ASPIRIN 81 MG/1
81 TABLET, CHEWABLE ORAL DAILY
Qty: 30 TABLET | Refills: 1 | OUTPATIENT
Start: 2020-11-16

## 2020-11-16 RX ORDER — LISINOPRIL 10 MG/1
10 TABLET ORAL DAILY
Qty: 90 TABLET | Refills: 1 | Status: SHIPPED | OUTPATIENT
Start: 2020-11-16 | End: 2020-12-13

## 2020-12-09 ENCOUNTER — TELEPHONE (OUTPATIENT)
Dept: NEUROLOGY | Facility: CLINIC | Age: 70
End: 2020-12-09

## 2020-12-09 DIAGNOSIS — E55.9 VITAMIN D DEFICIENCY: ICD-10-CM

## 2020-12-09 DIAGNOSIS — I63.9 CEREBROVASCULAR ACCIDENT (CVA), UNSPECIFIED MECHANISM (HCC): ICD-10-CM

## 2020-12-09 DIAGNOSIS — E11.9 TYPE 2 DIABETES MELLITUS WITHOUT COMPLICATION, WITHOUT LONG-TERM CURRENT USE OF INSULIN (HCC): ICD-10-CM

## 2020-12-09 DIAGNOSIS — G31.84 MILD COGNITIVE IMPAIRMENT: ICD-10-CM

## 2020-12-09 DIAGNOSIS — E78.49 OTHER HYPERLIPIDEMIA: ICD-10-CM

## 2020-12-09 DIAGNOSIS — I10 ESSENTIAL HYPERTENSION: ICD-10-CM

## 2020-12-09 DIAGNOSIS — R56.9 SEIZURE (HCC): ICD-10-CM

## 2020-12-09 DIAGNOSIS — M25.512 ACUTE PAIN OF LEFT SHOULDER: ICD-10-CM

## 2020-12-09 DIAGNOSIS — Z86.73 HISTORY OF STROKE: ICD-10-CM

## 2020-12-09 DIAGNOSIS — I63.019 CEREBROVASCULAR ACCIDENT (CVA) DUE TO THROMBOSIS OF VERTEBRAL ARTERY, UNSPECIFIED BLOOD VESSEL LATERALITY (HCC): ICD-10-CM

## 2020-12-09 RX ORDER — CLOPIDOGREL BISULFATE 75 MG/1
75 TABLET ORAL DAILY
Qty: 30 TABLET | Refills: 5 | Status: SHIPPED | OUTPATIENT
Start: 2020-12-09 | End: 2021-06-07 | Stop reason: SDUPTHER

## 2020-12-13 RX ORDER — CYCLOBENZAPRINE HCL 5 MG
TABLET ORAL
Qty: 30 TABLET | Refills: 5 | Status: SHIPPED | OUTPATIENT
Start: 2020-12-13 | End: 2021-01-15

## 2020-12-13 RX ORDER — ASPIRIN 81 MG/1
81 TABLET, CHEWABLE ORAL DAILY
Qty: 30 TABLET | Refills: 5 | Status: SHIPPED | OUTPATIENT
Start: 2020-12-13 | End: 2021-01-15

## 2020-12-13 RX ORDER — LANOLIN ALCOHOL/MO/W.PET/CERES
CREAM (GRAM) TOPICAL
Qty: 30 TABLET | Refills: 5 | Status: SHIPPED | OUTPATIENT
Start: 2020-12-13 | End: 2021-03-19

## 2020-12-13 RX ORDER — LEVETIRACETAM 750 MG/1
750 TABLET ORAL 2 TIMES DAILY
Qty: 60 TABLET | Refills: 5 | Status: SHIPPED | OUTPATIENT
Start: 2020-12-13 | End: 2021-01-15

## 2020-12-13 RX ORDER — ATORVASTATIN CALCIUM 80 MG/1
80 TABLET, FILM COATED ORAL DAILY
Qty: 30 TABLET | Refills: 5 | Status: SHIPPED | OUTPATIENT
Start: 2020-12-13 | End: 2021-01-15

## 2020-12-13 RX ORDER — LISINOPRIL 10 MG/1
10 TABLET ORAL DAILY
Qty: 30 TABLET | Refills: 5 | Status: SHIPPED | OUTPATIENT
Start: 2020-12-13 | End: 2021-07-16 | Stop reason: SDUPTHER

## 2020-12-13 RX ORDER — MELATONIN
Qty: 30 TABLET | Refills: 5 | Status: SHIPPED | OUTPATIENT
Start: 2020-12-13 | End: 2021-01-15

## 2020-12-13 RX ORDER — DONEPEZIL HYDROCHLORIDE 10 MG/1
TABLET, FILM COATED ORAL
Qty: 30 TABLET | Refills: 5 | Status: SHIPPED | OUTPATIENT
Start: 2020-12-13 | End: 2021-01-15

## 2020-12-28 ENCOUNTER — OFFICE VISIT (OUTPATIENT)
Dept: FAMILY MEDICINE CLINIC | Facility: CLINIC | Age: 70
End: 2020-12-28

## 2020-12-28 DIAGNOSIS — E78.49 OTHER HYPERLIPIDEMIA: ICD-10-CM

## 2020-12-28 DIAGNOSIS — I10 ESSENTIAL HYPERTENSION: Primary | ICD-10-CM

## 2020-12-28 DIAGNOSIS — E11.9 TYPE 2 DIABETES MELLITUS WITHOUT COMPLICATION, WITHOUT LONG-TERM CURRENT USE OF INSULIN (HCC): ICD-10-CM

## 2020-12-28 PROCEDURE — G2025 DIS SITE TELE SVCS RHC/FQHC: HCPCS | Performed by: FAMILY MEDICINE

## 2021-01-05 ENCOUNTER — TELEPHONE (OUTPATIENT)
Dept: NEUROLOGY | Facility: CLINIC | Age: 71
End: 2021-01-05

## 2021-01-05 NOTE — TELEPHONE ENCOUNTER
1/5/21 Dr Esperanza Beth came into office to sign letter for patient regarding patient health status    Letter mailed 1/5/21 to:    [place] [address] [address] [id number]    Scanned to Clemente De La Rosa

## 2021-01-14 DIAGNOSIS — I63.019 CEREBROVASCULAR ACCIDENT (CVA) DUE TO THROMBOSIS OF VERTEBRAL ARTERY, UNSPECIFIED BLOOD VESSEL LATERALITY (HCC): ICD-10-CM

## 2021-01-14 DIAGNOSIS — R56.9 SEIZURE (HCC): ICD-10-CM

## 2021-01-14 DIAGNOSIS — E11.9 TYPE 2 DIABETES MELLITUS WITHOUT COMPLICATION, WITHOUT LONG-TERM CURRENT USE OF INSULIN (HCC): Primary | ICD-10-CM

## 2021-01-14 DIAGNOSIS — E78.49 OTHER HYPERLIPIDEMIA: ICD-10-CM

## 2021-01-14 DIAGNOSIS — M25.512 ACUTE PAIN OF LEFT SHOULDER: ICD-10-CM

## 2021-01-14 DIAGNOSIS — E55.9 VITAMIN D DEFICIENCY: ICD-10-CM

## 2021-01-14 DIAGNOSIS — G31.84 MILD COGNITIVE IMPAIRMENT: ICD-10-CM

## 2021-01-15 RX ORDER — LEVETIRACETAM 750 MG/1
750 TABLET ORAL 2 TIMES DAILY
Qty: 60 TABLET | Refills: 1 | Status: SHIPPED | OUTPATIENT
Start: 2021-01-15 | End: 2021-03-19

## 2021-01-15 RX ORDER — CYCLOBENZAPRINE HCL 5 MG
TABLET ORAL
Qty: 30 TABLET | Refills: 1 | Status: SHIPPED | OUTPATIENT
Start: 2021-01-15 | End: 2021-03-19

## 2021-01-15 RX ORDER — MELATONIN
Qty: 30 TABLET | Refills: 1 | Status: SHIPPED | OUTPATIENT
Start: 2021-01-15 | End: 2021-03-19

## 2021-01-15 RX ORDER — DONEPEZIL HYDROCHLORIDE 10 MG/1
TABLET, FILM COATED ORAL
Qty: 30 TABLET | Refills: 1 | Status: SHIPPED | OUTPATIENT
Start: 2021-01-15 | End: 2021-03-19

## 2021-01-15 RX ORDER — ASPIRIN 81 MG/1
81 TABLET, CHEWABLE ORAL DAILY
Qty: 30 TABLET | Refills: 1 | Status: SHIPPED | OUTPATIENT
Start: 2021-01-15 | End: 2021-03-19

## 2021-01-15 RX ORDER — ATORVASTATIN CALCIUM 80 MG/1
80 TABLET, FILM COATED ORAL DAILY
Qty: 30 TABLET | Refills: 1 | Status: SHIPPED | OUTPATIENT
Start: 2021-01-15 | End: 2021-03-19

## 2021-03-13 DIAGNOSIS — E11.9 TYPE 2 DIABETES MELLITUS WITHOUT COMPLICATION, WITHOUT LONG-TERM CURRENT USE OF INSULIN (HCC): ICD-10-CM

## 2021-03-13 DIAGNOSIS — R56.9 SEIZURE (HCC): ICD-10-CM

## 2021-03-13 DIAGNOSIS — G31.84 MILD COGNITIVE IMPAIRMENT: ICD-10-CM

## 2021-03-13 DIAGNOSIS — Z86.73 HISTORY OF STROKE: ICD-10-CM

## 2021-03-13 DIAGNOSIS — I63.019 CEREBROVASCULAR ACCIDENT (CVA) DUE TO THROMBOSIS OF VERTEBRAL ARTERY, UNSPECIFIED BLOOD VESSEL LATERALITY (HCC): ICD-10-CM

## 2021-03-13 DIAGNOSIS — E78.49 OTHER HYPERLIPIDEMIA: ICD-10-CM

## 2021-03-13 DIAGNOSIS — E55.9 VITAMIN D DEFICIENCY: ICD-10-CM

## 2021-03-13 DIAGNOSIS — M25.512 ACUTE PAIN OF LEFT SHOULDER: ICD-10-CM

## 2021-03-19 RX ORDER — CYCLOBENZAPRINE HCL 5 MG
TABLET ORAL
Qty: 30 TABLET | Refills: 5 | Status: SHIPPED | OUTPATIENT
Start: 2021-03-19 | End: 2022-03-14 | Stop reason: SDUPTHER

## 2021-03-19 RX ORDER — LANOLIN ALCOHOL/MO/W.PET/CERES
CREAM (GRAM) TOPICAL
Qty: 30 TABLET | Refills: 3 | Status: SHIPPED | OUTPATIENT
Start: 2021-03-19 | End: 2021-07-16 | Stop reason: SDUPTHER

## 2021-03-19 RX ORDER — LEVETIRACETAM 750 MG/1
750 TABLET ORAL 2 TIMES DAILY
Qty: 60 TABLET | Refills: 5 | Status: SHIPPED | OUTPATIENT
Start: 2021-03-19 | End: 2022-03-07 | Stop reason: SDUPTHER

## 2021-03-19 RX ORDER — MELATONIN
Qty: 30 TABLET | Refills: 5 | Status: SHIPPED | OUTPATIENT
Start: 2021-03-19 | End: 2022-03-14 | Stop reason: SDUPTHER

## 2021-03-19 RX ORDER — ATORVASTATIN CALCIUM 80 MG/1
80 TABLET, FILM COATED ORAL DAILY
Qty: 30 TABLET | Refills: 5 | Status: SHIPPED | OUTPATIENT
Start: 2021-03-19 | End: 2022-03-14 | Stop reason: SDUPTHER

## 2021-03-19 RX ORDER — DONEPEZIL HYDROCHLORIDE 10 MG/1
TABLET, FILM COATED ORAL
Qty: 30 TABLET | Refills: 5 | Status: SHIPPED | OUTPATIENT
Start: 2021-03-19 | End: 2022-03-14 | Stop reason: SDUPTHER

## 2021-03-19 RX ORDER — ASPIRIN 81 MG/1
81 TABLET, CHEWABLE ORAL DAILY
Qty: 30 TABLET | Refills: 5 | Status: SHIPPED | OUTPATIENT
Start: 2021-03-19 | End: 2022-03-14 | Stop reason: SDUPTHER

## 2021-03-30 ENCOUNTER — REMOTE DEVICE CLINIC VISIT (OUTPATIENT)
Dept: CARDIOLOGY CLINIC | Facility: CLINIC | Age: 71
End: 2021-03-30
Payer: MEDICARE

## 2021-03-30 DIAGNOSIS — Z95.818 PRESENCE OF OTHER CARDIAC IMPLANTS AND GRAFTS: Primary | ICD-10-CM

## 2021-03-30 PROCEDURE — G2066 INTER DEVC REMOTE 30D: HCPCS | Performed by: INTERNAL MEDICINE

## 2021-03-30 PROCEDURE — 93298 REM INTERROG DEV EVAL SCRMS: CPT | Performed by: INTERNAL MEDICINE

## 2021-03-30 NOTE — PROGRESS NOTES
MDT LOOP   CARELINK TRANSMISSION: LOOP RECORDER  PRESENTING RHYTHM NSR @ 74 BPM  BATTERY STATUS "OK"  NO PATIENT OR DEVICE ACTIVATED EPISODES  NORMAL DEVICE FUNCTION   DL

## 2021-06-07 DIAGNOSIS — I63.9 CEREBROVASCULAR ACCIDENT (CVA), UNSPECIFIED MECHANISM (HCC): ICD-10-CM

## 2021-06-07 RX ORDER — CLOPIDOGREL BISULFATE 75 MG/1
75 TABLET ORAL DAILY
Qty: 30 TABLET | Refills: 5 | Status: SHIPPED | OUTPATIENT
Start: 2021-06-07 | End: 2021-07-16 | Stop reason: SDUPTHER

## 2021-06-28 ENCOUNTER — TELEPHONE (OUTPATIENT)
Dept: FAMILY MEDICINE CLINIC | Facility: CLINIC | Age: 71
End: 2021-06-28

## 2021-06-28 NOTE — TELEPHONE ENCOUNTER
Call from [facility], Pt's GUILLERMO made appt with DR Eid Handler 7/6/21 @ 2:20P  Put paperwork in Dr Shawn Ugarte folder

## 2021-06-28 NOTE — TELEPHONE ENCOUNTER
Letter from [facility] was faxed to request documentation on DM foot exam  Last exam 10/29/19  Note on paperwork to schedule DM follow up  Called her residence, [facility] to schedule appt with Dr Salvatore Chavez  They said they had to reach out to family who will schedule appt  Paperwork with Hersey Gottron in top drawer  Please add date to sticky on paper and put in Dr Keenan Neither or whoever she is scheduled with folder

## 2021-06-30 ENCOUNTER — REMOTE DEVICE CLINIC VISIT (OUTPATIENT)
Dept: CARDIOLOGY CLINIC | Facility: CLINIC | Age: 71
End: 2021-06-30
Payer: MEDICARE

## 2021-06-30 DIAGNOSIS — Z95.818 PRESENCE OF OTHER CARDIAC IMPLANTS AND GRAFTS: Primary | ICD-10-CM

## 2021-06-30 PROCEDURE — G2066 INTER DEVC REMOTE 30D: HCPCS | Performed by: INTERNAL MEDICINE

## 2021-06-30 PROCEDURE — 93298 REM INTERROG DEV EVAL SCRMS: CPT | Performed by: INTERNAL MEDICINE

## 2021-06-30 NOTE — PROGRESS NOTES
MDT LOOP   CARELINK TRANSMISSION: LOOP RECORDER  PRESENTING RHYTHM NSR @ 77 BPM  BATTERY STATUS "OK " NO PATIENT OR DEVICE ACTIVATED EPISODES  NORMAL DEVICE FUNCTION   DL

## 2021-07-06 ENCOUNTER — OFFICE VISIT (OUTPATIENT)
Dept: FAMILY MEDICINE CLINIC | Facility: CLINIC | Age: 71
End: 2021-07-06

## 2021-07-06 VITALS
RESPIRATION RATE: 16 BRPM | BODY MASS INDEX: 29.35 KG/M2 | HEIGHT: 67 IN | HEART RATE: 92 BPM | DIASTOLIC BLOOD PRESSURE: 80 MMHG | SYSTOLIC BLOOD PRESSURE: 122 MMHG | TEMPERATURE: 96.6 F | OXYGEN SATURATION: 97 % | WEIGHT: 187 LBS

## 2021-07-06 DIAGNOSIS — E11.9 TYPE 2 DIABETES MELLITUS WITHOUT COMPLICATION, WITHOUT LONG-TERM CURRENT USE OF INSULIN (HCC): Primary | ICD-10-CM

## 2021-07-06 LAB — SL AMB POCT HEMOGLOBIN AIC: 5.6 (ref ?–6.5)

## 2021-07-06 PROCEDURE — 99213 OFFICE O/P EST LOW 20 MIN: CPT | Performed by: FAMILY MEDICINE

## 2021-07-06 PROCEDURE — 83036 HEMOGLOBIN GLYCOSYLATED A1C: CPT | Performed by: FAMILY MEDICINE

## 2021-07-06 NOTE — PROGRESS NOTES
Assessment/Plan:    Type 2 diabetes mellitus without complication, without long-term current use of insulin ([facility])  - Last A1C 5 7% today, well controlled  - Medications: Metformin 500 mg daily  - Had advised discontinuing at last visit, will discontinue at this visit and follow-up a1c in 3 months  - Discussed with brother in the office and filled out paperwork stating this for [facility]  - Neuropathy: patient follows with foot group, needs diabetic footwear  - Encouraged a healthy diet and exercise           Diagnoses and all orders for this visit:    Type 2 diabetes mellitus without complication, without long-term current use of insulin ([facility] [address] )  -     POCT hemoglobin A1c        Subjective:      Patient ID: Sydnee Davis is a 79 y o  female  26-year-old female with a history of CVA and diabetes presenting for a diabetic foot exam and follow-up  She is presenting today from [facility] with her brother  He reports that she needs diabetic footwear and that she needed to have a formal foot exam by her PCP  He reports that there are no other concerns and they've been feeding hear heart healthy and diabetic diet at the [facility]  The following portions of the patient's history were reviewed and updated as appropriate: allergies, current medications, past family history, past medical history, past social history, past surgical history and problem list     Review of Systems   Constitutional: Negative  HENT: Negative  Cardiovascular: Negative  Gastrointestinal: Negative  Neurological: Negative  Objective:      /80 (BP Location: Left arm, Patient Position: Sitting, Cuff Size: Standard)   Pulse 92   Temp (!) 96 6 °F (35 9 °C) (Tympanic)   Resp 16   Ht 5' 7" (1 702 m)   Wt 84 8 kg (187 lb)   SpO2 97%   BMI 29 29 kg/m²          Physical Exam  Constitutional:       Appearance: Normal appearance  HENT:      Head: Normocephalic and atraumatic     Musculoskeletal: General: Normal range of motion  Feet:    Skin:     General: Skin is warm and dry  Neurological:      Mental Status: She is alert  Mental status is at baseline  Psychiatric:         Behavior: Behavior normal            Patient's shoes and socks removed

## 2021-07-06 NOTE — ASSESSMENT & PLAN NOTE
- Last A1C 5 7% today, well controlled  - Medications: Metformin 500 mg daily  - Had advised discontinuing at last visit, will discontinue at this visit and follow-up a1c in 3 months  - Discussed with brother in the office and filled out paperwork stating this for [facility]  - Neuropathy: patient follows with foot group, needs diabetic footwear  - Encouraged a healthy diet and exercise

## 2021-07-07 ENCOUNTER — TELEPHONE (OUTPATIENT)
Dept: FAMILY MEDICINE CLINIC | Facility: CLINIC | Age: 71
End: 2021-07-07

## 2021-07-07 NOTE — TELEPHONE ENCOUNTER
Ron Shepherd from [facility] called stating a fax was sent on 6/22 regarding a request for patient to get diabetic shoes  Fax is being resent

## 2021-07-16 DIAGNOSIS — I10 ESSENTIAL HYPERTENSION: ICD-10-CM

## 2021-07-16 DIAGNOSIS — Z86.73 HISTORY OF STROKE: ICD-10-CM

## 2021-07-16 DIAGNOSIS — I63.9 CEREBROVASCULAR ACCIDENT (CVA), UNSPECIFIED MECHANISM (HCC): ICD-10-CM

## 2021-07-17 RX ORDER — LISINOPRIL 10 MG/1
10 TABLET ORAL DAILY
Qty: 30 TABLET | Refills: 5 | Status: SHIPPED | OUTPATIENT
Start: 2021-07-17 | End: 2022-03-07 | Stop reason: SDUPTHER

## 2021-07-17 RX ORDER — CLOPIDOGREL BISULFATE 75 MG/1
75 TABLET ORAL DAILY
Qty: 30 TABLET | Refills: 5 | Status: SHIPPED | OUTPATIENT
Start: 2021-07-17 | End: 2022-03-14 | Stop reason: SDUPTHER

## 2021-07-17 RX ORDER — LANOLIN ALCOHOL/MO/W.PET/CERES
1000 CREAM (GRAM) TOPICAL DAILY
Qty: 30 TABLET | Refills: 5 | Status: SHIPPED | OUTPATIENT
Start: 2021-07-17 | End: 2022-03-14 | Stop reason: SDUPTHER

## 2021-09-30 ENCOUNTER — DIABETIC EYE EXAM (OUTPATIENT)
Dept: URBAN - METROPOLITAN AREA CLINIC 6 | Facility: CLINIC | Age: 71
End: 2021-09-30

## 2021-09-30 DIAGNOSIS — E11.9: ICD-10-CM

## 2021-09-30 DIAGNOSIS — H02.834: ICD-10-CM

## 2021-09-30 DIAGNOSIS — H02.831: ICD-10-CM

## 2021-09-30 DIAGNOSIS — H52.03: ICD-10-CM

## 2021-09-30 DIAGNOSIS — H25.13: ICD-10-CM

## 2021-09-30 PROCEDURE — G8427 DOCREV CUR MEDS BY ELIG CLIN: HCPCS

## 2021-09-30 PROCEDURE — 1036F TOBACCO NON-USER: CPT

## 2021-09-30 PROCEDURE — 2022F DILAT RTA XM EVC RTNOPTHY: CPT

## 2021-09-30 PROCEDURE — 92014 COMPRE OPH EXAM EST PT 1/>: CPT

## 2021-09-30 PROCEDURE — 92015 DETERMINE REFRACTIVE STATE: CPT

## 2021-09-30 PROCEDURE — 3072F LOW RISK FOR RETINOPATHY: CPT

## 2021-09-30 PROCEDURE — 92134 CPTRZ OPH DX IMG PST SGM RTA: CPT | Mod: NC

## 2021-09-30 ASSESSMENT — VISUAL ACUITY
OS_CC: 20/200
OD_CC: 20/60
OU_CC: J2

## 2021-09-30 ASSESSMENT — TONOMETRY
OD_IOP_MMHG: 11
OS_IOP_MMHG: 13

## 2021-10-01 ENCOUNTER — REMOTE DEVICE CLINIC VISIT (OUTPATIENT)
Dept: CARDIOLOGY CLINIC | Facility: CLINIC | Age: 71
End: 2021-10-01
Payer: MEDICARE

## 2021-10-01 DIAGNOSIS — Z95.818 PRESENCE OF OTHER CARDIAC IMPLANTS AND GRAFTS: Primary | ICD-10-CM

## 2021-10-01 PROCEDURE — 93298 REM INTERROG DEV EVAL SCRMS: CPT | Performed by: INTERNAL MEDICINE

## 2021-10-01 PROCEDURE — G2066 INTER DEVC REMOTE 30D: HCPCS | Performed by: INTERNAL MEDICINE

## 2021-10-21 ENCOUNTER — OFFICE VISIT (OUTPATIENT)
Dept: FAMILY MEDICINE CLINIC | Facility: CLINIC | Age: 71
End: 2021-10-21

## 2021-10-21 VITALS
HEART RATE: 102 BPM | BODY MASS INDEX: 30.29 KG/M2 | WEIGHT: 193 LBS | TEMPERATURE: 97.3 F | DIASTOLIC BLOOD PRESSURE: 100 MMHG | SYSTOLIC BLOOD PRESSURE: 160 MMHG | OXYGEN SATURATION: 98 % | HEIGHT: 67 IN | RESPIRATION RATE: 18 BRPM

## 2021-10-21 DIAGNOSIS — Z23 ENCOUNTER FOR IMMUNIZATION: ICD-10-CM

## 2021-10-21 DIAGNOSIS — E11.9 TYPE 2 DIABETES MELLITUS WITHOUT COMPLICATION, WITHOUT LONG-TERM CURRENT USE OF INSULIN (HCC): Primary | ICD-10-CM

## 2021-10-21 LAB — SL AMB POCT HEMOGLOBIN AIC: 5.8 (ref ?–6.5)

## 2021-10-21 PROCEDURE — 99213 OFFICE O/P EST LOW 20 MIN: CPT | Performed by: FAMILY MEDICINE

## 2021-10-21 PROCEDURE — 83036 HEMOGLOBIN GLYCOSYLATED A1C: CPT | Performed by: FAMILY MEDICINE

## 2021-11-29 ENCOUNTER — ESTABLISHED COMPREHENSIVE EXAM (OUTPATIENT)
Dept: URBAN - METROPOLITAN AREA CLINIC 6 | Facility: CLINIC | Age: 71
End: 2021-11-29

## 2021-11-29 DIAGNOSIS — H25.13: ICD-10-CM

## 2021-11-29 DIAGNOSIS — E11.9: ICD-10-CM

## 2021-11-29 PROCEDURE — G8427 DOCREV CUR MEDS BY ELIG CLIN: HCPCS

## 2021-11-29 PROCEDURE — 2022F DILAT RTA XM EVC RTNOPTHY: CPT

## 2021-11-29 PROCEDURE — 92012 INTRM OPH EXAM EST PATIENT: CPT

## 2021-11-29 PROCEDURE — 92136 OPHTHALMIC BIOMETRY: CPT

## 2021-11-29 PROCEDURE — 3072F LOW RISK FOR RETINOPATHY: CPT

## 2021-11-29 PROCEDURE — 1036F TOBACCO NON-USER: CPT

## 2021-11-29 ASSESSMENT — TONOMETRY
OD_IOP_MMHG: 15
OS_IOP_MMHG: 14

## 2021-11-29 ASSESSMENT — VISUAL ACUITY
OU_CC: J1-2
OS_SC: 20/60-1
OD_PH: 20/30
OD_SC: 20/50
OU_OTHER: @14""

## 2021-12-02 ENCOUNTER — OFFICE VISIT (OUTPATIENT)
Dept: FAMILY MEDICINE CLINIC | Facility: CLINIC | Age: 71
End: 2021-12-02

## 2021-12-02 VITALS
SYSTOLIC BLOOD PRESSURE: 144 MMHG | HEART RATE: 87 BPM | DIASTOLIC BLOOD PRESSURE: 92 MMHG | RESPIRATION RATE: 18 BRPM | OXYGEN SATURATION: 97 % | BODY MASS INDEX: 31.17 KG/M2 | HEIGHT: 67 IN | TEMPERATURE: 98.3 F | WEIGHT: 198.6 LBS

## 2021-12-02 DIAGNOSIS — I10 ESSENTIAL HYPERTENSION: ICD-10-CM

## 2021-12-02 DIAGNOSIS — E11.9 TYPE 2 DIABETES MELLITUS WITHOUT COMPLICATION, WITHOUT LONG-TERM CURRENT USE OF INSULIN (HCC): ICD-10-CM

## 2021-12-02 DIAGNOSIS — Z00.00 MEDICARE WELCOME EXAM: Primary | ICD-10-CM

## 2021-12-02 PROCEDURE — G0439 PPPS, SUBSEQ VISIT: HCPCS | Performed by: FAMILY MEDICINE

## 2021-12-02 PROCEDURE — 1123F ACP DISCUSS/DSCN MKR DOCD: CPT | Performed by: FAMILY MEDICINE

## 2022-01-24 ENCOUNTER — OFFICE VISIT (OUTPATIENT)
Dept: FAMILY MEDICINE CLINIC | Facility: CLINIC | Age: 72
End: 2022-01-24

## 2022-01-24 VITALS
WEIGHT: 201.8 LBS | DIASTOLIC BLOOD PRESSURE: 82 MMHG | TEMPERATURE: 98.1 F | BODY MASS INDEX: 31.67 KG/M2 | RESPIRATION RATE: 18 BRPM | SYSTOLIC BLOOD PRESSURE: 148 MMHG | HEART RATE: 98 BPM | OXYGEN SATURATION: 99 % | HEIGHT: 67 IN

## 2022-01-24 DIAGNOSIS — H25.9 SENILE CATARACT OF LEFT EYE, UNSPECIFIED AGE-RELATED CATARACT TYPE: Primary | ICD-10-CM

## 2022-01-24 PROBLEM — H26.9 CATARACT: Status: ACTIVE | Noted: 2022-01-24

## 2022-01-24 PROCEDURE — 99213 OFFICE O/P EST LOW 20 MIN: CPT | Performed by: FAMILY MEDICINE

## 2022-01-24 NOTE — PROGRESS NOTES
[facility] PRE-OPERATIVE EXAMINATION  Lina Caldwell  1950    Lina Caldwell is a 70 y o  female with left eye cataract who is planning to undergo cataract removal under local by Dr Lawanda Figueroa on 2/1/22  Patient has not had complications with anesthesia in the past     ROS:   Chest pain: no   Shortness of breath: no  Shortness of breath with exertion: no  Orthopnea: no  Dizziness: no  Unexplained weight change: no    PMH:  CAD: yes  HTN: yes  CKD: no  DM: yes, history of DM, not on insulin therapy  History of CVA: yes     reports that she has never smoked  She has never used smokeless tobacco  She reports that she does not drink alcohol and does not use drugs  /82 (BP Location: Right arm, Patient Position: Sitting, Cuff Size: Standard)   Pulse 98   Temp 98 1 °F (36 7 °C) (Temporal)   Resp 18   Ht 5' 7" (1 702 m)   Wt 91 5 kg (201 lb 12 8 oz)   SpO2 99%   BMI 31 61 kg/m²   Physical Exam  Constitutional:       Appearance: Normal appearance  HENT:      Head: Normocephalic and atraumatic  Cardiovascular:      Rate and Rhythm: Normal rate  Heart sounds: Normal heart sounds  Pulmonary:      Effort: Pulmonary effort is normal       Breath sounds: Normal breath sounds  Abdominal:      General: Bowel sounds are normal       Palpations: Abdomen is soft  Musculoskeletal:         General: Tenderness (of the left hip and knees) present  Normal range of motion  Skin:     General: Skin is warm and dry  Neurological:      Mental Status: She is alert  Mental status is at baseline     Psychiatric:         Mood and Affect: Mood normal          Behavior: Behavior normal          Revised Cardiac Risk Index (RCRI) for Pre-Operative Risk   (estimates risk of cardiac complications after noncardiac surgery)    · High-risk surgery: No 0 / Yes +1  · Intraperitoneal, intrathoracic, suprainguinal vascular  · History of ischemic heart disease: No 0 / Yes +1  · Hx of MI, (+) exercise test, current chest pain considered due to myocardial ischemia, use of nitrate therapy or ECG with pathological Q waves)  · History of CHF: No 0 / Yes +1  · Pulmonary edema, B/L rales or S3 gallop; HOFF, orthopnea, PND, CXR showing pulmonary vascular redistribution)  · History of cerebrovascular disease: No 0 / Yes +1  · Prior TIA or stroke  · Pre-operative treatment with insulin: No 0 / Yes +1  · Pre-operative creatinine >2 mg/dL: No 0 / Yes +1    RCRI Scoring: (score 0= low risk, 1-2=intermediate risk, 3-6: high risk)  · 0 points: Class I Risk, 3 9% Risk of Major Cardiac Event  · 1 point: Class II Risk, 6 0 % Risk of Major Cardiac Event  · 2 points: Class III Risk, 10 1% Risk of Major Cardiac Event  · 3 points: Class IV Risk, 15% Risk of Major Cardiac Event  · 4 points: Class IV Risk, 15% Risk of Major Cardiac Event  · 5 points: Class IV Risk, 15% Risk of Major Cardiac Event  · 6 points: Class IV Risk, 15% Risk of Major Cardiac Event    Lab Results   Component Value Date    CREATININE 1 00 12/04/2018       There are no diagnoses linked to this encounter  Recommendations:  Katelin Wan is undergoing an elective Minimal risk surgery, cataract removal, left eye on 2/1/22  She is RCRI class III risk (2 points for CVA and CAD) with 10 1% risk for major adverse cardiac event (MACE)  She may proceed with surgery as planned without further workup  Pre-operative form completed and faxed today to office as requested  Discussed with Dr Ashly Juárez MD PGY-3  [place] [id number]

## 2022-02-01 ENCOUNTER — SURGERY/PROCEDURE (OUTPATIENT)
Dept: URBAN - METROPOLITAN AREA SURGICAL CENTER 6 | Facility: SURGICAL CENTER | Age: 72
End: 2022-02-01

## 2022-02-01 DIAGNOSIS — H25.12: ICD-10-CM

## 2022-02-01 PROCEDURE — 66984 XCAPSL CTRC RMVL W/O ECP: CPT

## 2022-02-01 PROCEDURE — 68841 INSJ RX ELUT IMPLT LAC CANAL: CPT

## 2022-02-02 ENCOUNTER — 1 DAY POST-OP (OUTPATIENT)
Dept: URBAN - METROPOLITAN AREA CLINIC 6 | Facility: CLINIC | Age: 72
End: 2022-02-02

## 2022-02-02 DIAGNOSIS — Z96.1: ICD-10-CM

## 2022-02-02 PROCEDURE — 99024 POSTOP FOLLOW-UP VISIT: CPT

## 2022-02-02 ASSESSMENT — VISUAL ACUITY
OS_SC: 20/25+2
OS_OTHER: 1 DAY POST OP

## 2022-02-02 ASSESSMENT — TONOMETRY
OD_IOP_MMHG: 12
OS_IOP_MMHG: 11

## 2022-02-04 ENCOUNTER — APPOINTMENT (EMERGENCY)
Dept: RADIOLOGY | Facility: HOSPITAL | Age: 72
End: 2022-02-04
Payer: MEDICARE

## 2022-02-04 ENCOUNTER — HOSPITAL ENCOUNTER (EMERGENCY)
Facility: HOSPITAL | Age: 72
Discharge: HOME | End: 2022-02-04
Admitting: EMERGENCY MEDICINE
Payer: MEDICARE

## 2022-02-04 VITALS
DIASTOLIC BLOOD PRESSURE: 69 MMHG | WEIGHT: 237 LBS | SYSTOLIC BLOOD PRESSURE: 149 MMHG | RESPIRATION RATE: 19 BRPM | HEART RATE: 72 BPM | OXYGEN SATURATION: 98 % | TEMPERATURE: 98.9 F
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
## 2022-02-04 PROBLEM — S09.90XA CLOSED HEAD INJURY: Status: ACTIVE | Noted: 2022-02-04

## 2022-02-04 LAB
ABO GROUP BLD: NORMAL
BASE EXCESS BLDA CALC-SCNC: 5 MMOL/L (ref -2–3)
BLD GP AB SCN SERPL QL: NEGATIVE
GLUCOSE SERPL-MCNC: 109 MG/DL (ref 65–140)
HCO3 BLDA-SCNC: 32.1 MMOL/L (ref 24–30)
HCT VFR BLD CALC: 37 % (ref 34.8–46.1)
HGB BLDA-MCNC: 12.6 G/DL (ref 11.5–15.4)
HOLD SPECIMEN: NORMAL
PCO2 BLD: 34 MMOL/L (ref 21–32)
PCO2 BLD: 55.8 MM HG (ref 42–50)
PH BLD: 7.37 [PH] (ref 7.3–7.4)
PO2 BLD: 20 MM HG (ref 35–45)
POTASSIUM BLD-SCNC: 3.7 MMOL/L (ref 3.5–5.3)
RH BLD: POSITIVE
SAO2 % BLD FROM PO2: 28 % (ref 60–85)
SODIUM BLD-SCNC: 143 MMOL/L (ref 136–145)
SPECIMEN EXPIRATION DATE: NORMAL
SPECIMEN SOURCE: ABNORMAL

## 2022-02-04 PROCEDURE — 74177 CT ABD & PELVIS W/CONTRAST: CPT

## 2022-02-04 PROCEDURE — 82947 ASSAY GLUCOSE BLOOD QUANT: CPT

## 2022-02-04 PROCEDURE — 84132 ASSAY OF SERUM POTASSIUM: CPT

## 2022-02-04 PROCEDURE — 86900 BLOOD TYPING SEROLOGIC ABO: CPT | Performed by: SURGERY

## 2022-02-04 PROCEDURE — 82803 BLOOD GASES ANY COMBINATION: CPT

## 2022-02-04 PROCEDURE — 86901 BLOOD TYPING SEROLOGIC RH(D): CPT | Performed by: SURGERY

## 2022-02-04 PROCEDURE — 85014 HEMATOCRIT: CPT

## 2022-02-04 PROCEDURE — 36415 COLL VENOUS BLD VENIPUNCTURE: CPT | Performed by: EMERGENCY MEDICINE

## 2022-02-04 PROCEDURE — 1123F ACP DISCUSS/DSCN MKR DOCD: CPT | Performed by: SURGERY

## 2022-02-04 PROCEDURE — 99284 EMERGENCY DEPT VISIT MOD MDM: CPT | Performed by: SURGERY

## 2022-02-04 PROCEDURE — 93308 TTE F-UP OR LMTD: CPT | Performed by: SURGERY

## 2022-02-04 PROCEDURE — 70450 CT HEAD/BRAIN W/O DYE: CPT

## 2022-02-04 PROCEDURE — 84295 ASSAY OF SERUM SODIUM: CPT

## 2022-02-04 PROCEDURE — 99284 EMERGENCY DEPT VISIT MOD MDM: CPT

## 2022-02-04 PROCEDURE — NC001 PR NO CHARGE: Performed by: EMERGENCY MEDICINE

## 2022-02-04 PROCEDURE — 86850 RBC ANTIBODY SCREEN: CPT | Performed by: SURGERY

## 2022-02-04 PROCEDURE — 76705 ECHO EXAM OF ABDOMEN: CPT | Performed by: SURGERY

## 2022-02-04 RX ADMIN — IOHEXOL 100 ML: 350 INJECTION, SOLUTION INTRAVENOUS at 17:59

## 2022-02-04 NOTE — H&P
H&P Exam - Trauma   Rik Fitzgerald 70 y o  female MRN: [id number]  Unit/Bed#: TR 01 Encounter: [id number]    Assessment/Plan   Trauma Alert: Level B  Model of Arrival: Ambulance  Trauma Team: Attending Adalberto Pereira and Residents Starr Prince  Consultants: None    Trauma Active Problems: Fall, closed head injury    Trauma Plan:   - CT head with no traumatic injury  - CT AP with no traumatic injuries, incidental findings only  - Patient tolerating oral intake without nausea or vomiting  - Ambulatory without assistance  - Discharge back to [facility]      Chief Complaint: "I feel fine"    History of Present Illness   HPI:  Rik Fitzgerald is a 70 y o  female who presented to the ED as a level B trauma alert after a fall  She resides at [facility]  She reportedly fell backwards after standing up from her recliner and struck the back of her head  She is on Plavix  She had no loss of consciousness  She offers no complaints at this time  Mechanism:Fall    Review of Systems   Constitutional: Negative for chills and fever  Eyes: Negative for visual disturbance  Respiratory: Negative for shortness of breath  Cardiovascular: Negative for chest pain  Gastrointestinal: Negative for abdominal pain, nausea and vomiting  Genitourinary: Negative for difficulty urinating and flank pain  Musculoskeletal: Negative for back pain and neck pain  Skin: Negative for wound  Neurological: Negative for weakness, numbness and headaches  All other systems reviewed and are negative  12-point, complete review of systems was reviewed and negative except as stated above  Historical Information   History is unobtainable from the patient due to none  Efforts to obtain history included the following sources: obtained from other records    No past medical history on file  No past surgical history on file    Social History   Social History     Substance and Sexual Activity   Alcohol Use Not on file     Social History Substance and Sexual Activity   Drug Use Not on file     Social History     Tobacco Use   Smoking Status Not on file   Smokeless Tobacco Not on file     No existing history information found  No existing history information found  There is no immunization history on file for this patient  Last Tetanus: N/A  Family History: Non-contributory  Unable to obtain/limited by none      Meds/Allergies   all current active meds have been reviewed    Not on File      PHYSICAL EXAM    PE limited by: none    Objective   Vitals:   First set: Temperature: 98 9 °F (37 2 °C) (02/04/22 1754)  Pulse: 77 (02/04/22 1749)  Respirations: 16 (02/04/22 1749)  Blood Pressure: 166/97 (02/04/22 1749)    Primary Survey:   (A) Airway: intact  (B) Breathing: bilateral breath sounds  (C) Circulation: Pulses:   pedal  2/4, radial  2/4 and femoral  2/4  (D) Disabliity:  GCS Total:  15  (E) Expose:  Completed    Secondary Survey: (Click on Physical Exam tab above)  Physical Exam  Vitals and nursing note reviewed  Constitutional:       Appearance: Normal appearance  HENT:      Head: Normocephalic and atraumatic  Right Ear: Tympanic membrane normal       Nose: Nose normal       Mouth/Throat:      Mouth: Mucous membranes are moist       Comments: No intraoral injury  Eyes:      Extraocular Movements: Extraocular movements intact  Pupils: Pupils are equal, round, and reactive to light  Cardiovascular:      Rate and Rhythm: Normal rate  Heart sounds: No murmur heard  Pulmonary:      Effort: Pulmonary effort is normal       Breath sounds: Normal breath sounds  No wheezing, rhonchi or rales  Chest:      Chest wall: No tenderness  Abdominal:      General: There is no distension  Palpations: Abdomen is soft  Tenderness: There is no abdominal tenderness  Musculoskeletal:         General: No swelling or tenderness  Normal range of motion  Cervical back: Normal range of motion and neck supple   No rigidity or tenderness  Comments: No midline T or L spine tenderness  Skin:     General: Skin is warm and dry  Comments: Area of ecchymosis over the left lower back  Neurological:      General: No focal deficit present  Mental Status: She is alert and oriented to person, place, and time  Comments: GCS 15  CN 2-12 intact  5/5 strength and sensation intact to all 4 extremities  Invasive Devices  Report    None                 Lab Results: ISTAT: No components found for: VBG  Imaging/EKG Studies: Results: I have personally reviewed pertinent reports  TRAUMA - CT head wo contrast   Final Result by Sj Hunter MD (02/04 1823)      No intracranial hemorrhage or calvarial fracture  Advanced, 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  Workstation performed: [id number]         TRAUMA - CT abdomen pelvis w contrast   Final Result by Ruba Lott MD (02/04 1839)      1 cm enhancing mass in the right hepatic lobe  The appearance is nonspecific  Differential diagnosis includes, among other entities, hemangioma, adenoma and vascular shunt  If clinically indicated, this can be better characterized on MRI of the    abdomen  1 4 x 1 6 cm cyst in the distal pancreatic body, 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

## 2022-02-04 NOTE — ED PROVIDER NOTES
Emergency Department Airway Evaluation and Management Form    History  Obtained from:  EMS  Patient has no allergy information on record  No chief complaint on file  70-year-old female status post fall with head strike on Plavix and aspirin level B trauma          No past medical history on file  No past surgical history on file  No family history on file  Social History     Tobacco Use    Smoking status: Not on file    Smokeless tobacco: Not on file   Substance Use Topics    Alcohol use: Not on file    Drug use: Not on file     I have reviewed and agree with the history as documented  Review of Systems    Physical Exam  BP (!) 173/84   Pulse 72   Temp 98 9 °F (37 2 °C)   Resp 16   SpO2 98%     Physical Exam  HENT:      Mouth/Throat:      Comments: Airway open and patent          ED Medications  Medications   iohexol (OMNIPAQUE) 350 MG/ML injection (SINGLE-DOSE) 100 mL (100 mL Intravenous Given 2/4/22 6678)       Intubation  Procedures    Notes  No acute airway intervention    Final Diagnosis  Final diagnoses:   Fall, initial encounter       ED Provider  Electronically Signed by     Eliezer Guerrero MD  02/04/22 0529

## 2022-02-04 NOTE — PROCEDURES
POC FAST US    Date/Time: 2/4/2022 5:57 PM  Performed by: Ben Duff MD  Authorized by: Ben Duff MD     Patient location:  Trauma  Other Assisting Provider: Yes (comment) Emeterio Mcrae)    Procedure details:     Exam Type:  Diagnostic    Indications: blunt abdominal trauma      Assess for:  Intra-abdominal fluid and pericardial effusion    Technique: FAST      Views obtained:  Heart - Pericardial sac, LUQ - Splenorenal space, RUQ - Nguyen's Pouch and Suprapubic - Pouch of Alex    Image quality: diagnostic      Image availability:  Images available in PACS  FAST Findings:     RUQ (Hepatorenal) free fluid: absent      LUQ (Splenorenal) free fluid: absent      Suprapubic free fluid: absent      Cardiac wall motion: identified      Pericardial effusion: absent    Interpretation:     Impressions: negative

## 2022-02-05 NOTE — ED NOTES
PT ambulated to bathroom using walker accompanied by this tech but did not require assistance  PT then ate 2 packs of lulu crackers and drank cranberry juice  PT states she has no complaints at this time  RN and provider notified       Rhonda Heath  02/04/22 1949

## 2022-02-05 NOTE — DISCHARGE INSTRUCTIONS
You were evaluated in the emergency department after a fall  You were not found to have any traumatic injuries  You incidentally were found to have a lesion on your liver as well as a cyst on your pancreas  These are things you can follow up with your primary care physician about to determine the need for any further imaging or workup  You can take tylenol as needed for pain  Please return to the emergency department if you develop severe headache, confusion, weakness, numbness, vomiting, or anything else concerning to you  Head Injury   WHAT YOU NEED TO KNOW:   A head injury can include your scalp, face, skull, or brain and range from mild to severe  Effects can appear immediately after the injury or develop later  The effects may last a short time or be permanent  Healthcare providers may want to check your recovery over time  Treatment may change as you recover or develop new health problems from the head injury  DISCHARGE INSTRUCTIONS:   Call your local emergency number (911 in the [address] [place] [address],[address]), or have someone else call if:   · You cannot be woken  · You have a seizure  · You stop responding to others or you faint  · You have blurry or double vision  · Your speech becomes slurred or confused  · You have arm or leg weakness, loss of feeling, or new problems with coordination  · Your pupils are larger than usual, or one pupil is a different size than the other  · You have blood or clear fluid coming out of your ears or nose  Return to the emergency department if:   · You have repeated or forceful vomiting  · You feel confused  · Your headache gets worse or becomes severe  · You or someone caring for you notices that you are harder to wake than usual     Call your doctor if:   · Your symptoms last longer than 6 weeks after the injury  · You have questions or concerns about your condition or care  Medicines:   · Acetaminophen  decreases pain and fever   It is available without a doctor's order  Ask how much to take and how often to take it  Follow directions  Read the labels of all other medicines you are using to see if they also contain acetaminophen, or ask your doctor or pharmacist  Acetaminophen can cause liver damage if not taken correctly  Do not use more than 4 grams (4,000 milligrams) total of acetaminophen in one day  · Take your medicine as directed  Contact your healthcare provider if you think your medicine is not helping or if you have side effects  Tell him or her if you are allergic to any medicine  Keep a list of the medicines, vitamins, and herbs you take  Include the amounts, and when and why you take them  Bring the list or the pill bottles to follow-up visits  Carry your medicine list with you in case of an emergency  Self-care:   · Rest  or do quiet activities  Limit your time watching TV, using the computer, or doing tasks that require a lot of thinking  Slowly return to your normal activities as directed  Do not play sports or do activities that may cause you to get hit in the head  Ask your healthcare provider when you can return to sports  · Apply ice  on your head for 15 to 20 minutes every hour or as directed  Use an ice pack, or put crushed ice in a plastic bag  Cover it with a towel before you apply it to your skin  Ice helps prevent tissue damage and decreases swelling and pain  · Have someone stay with you for 24 hours  , or as directed  This person can monitor you for problems and call for help if needed  When you are awake, the person should ask you a few questions every few hours to see if you are thinking clearly  An example is to ask your name or address  Prevent another head injury:   · Wear a helmet that fits properly  Do this when you play sports, or ride a bike, scooter, or skateboard  Helmets help decrease your risk for a serious head injury   Talk to your healthcare provider about other ways you can protect yourself if you play sports  · Wear your seatbelt every time you are in a car  This helps lower your risk for a head injury if you are in a car accident  Follow up with your doctor as directed:  Write down your questions so you remember to ask them during your visits  © Copyright [employer] 2021 Information is for End User's use only and may not be sold, redistributed or otherwise used for commercial purposes  All illustrations and images included in CareNotes® are the copyrighted property of [employer]  or Daniel Chance   The above information is an  only  It is not intended as medical advice for individual conditions or treatments  Talk to your doctor, nurse or pharmacist before following any medical regimen to see if it is safe and effective for you

## 2022-02-07 ENCOUNTER — OFFICE VISIT (OUTPATIENT)
Dept: FAMILY MEDICINE CLINIC | Facility: CLINIC | Age: 72
End: 2022-02-07

## 2022-02-07 ENCOUNTER — TELEPHONE (OUTPATIENT)
Dept: FAMILY MEDICINE CLINIC | Facility: CLINIC | Age: 72
End: 2022-02-07

## 2022-02-07 VITALS
BODY MASS INDEX: 31.08 KG/M2 | WEIGHT: 198 LBS | SYSTOLIC BLOOD PRESSURE: 142 MMHG | RESPIRATION RATE: 20 BRPM | HEART RATE: 74 BPM | DIASTOLIC BLOOD PRESSURE: 90 MMHG | OXYGEN SATURATION: 98 % | TEMPERATURE: 97.4 F | HEIGHT: 67 IN

## 2022-02-07 DIAGNOSIS — M19.90 OSTEOARTHRITIS, UNSPECIFIED OSTEOARTHRITIS TYPE, UNSPECIFIED SITE: Primary | ICD-10-CM

## 2022-02-07 PROCEDURE — 99213 OFFICE O/P EST LOW 20 MIN: CPT | Performed by: FAMILY MEDICINE

## 2022-02-07 NOTE — TELEPHONE ENCOUNTER
Patient give us at the appt time an form to be completed by Dr Jim Da Silva  Form name is: Physician Order For [facility]  Form was completed at appt time and given to patient  A copy has been made an placed in the scanning folder at the clinical area

## 2022-02-07 NOTE — ASSESSMENT & PLAN NOTE
H/o OA in bilateral knees with recent fall  - 2/4: seen as trauma at [facility] AND [facility] ED, workup negative including CTH  - Patient reports pain in bilateral knees lately that may be making her unsteady, used to use Icy-Hot gel, but currently using nothing for pain/stiffness except walker for balance  - Recommended and prescribed Voltaren gel up to 4 times daily as needed and Motrin PRN  - Continue using walker for balance  - Follow-up in 3 months or sooner if patient falls or has worsening stiffness/pain in joints

## 2022-02-07 NOTE — TELEPHONE ENCOUNTER
Received paperwork from [facility] regarding the recent incident report for patients fall on 2/4/2022  Patient is here for an appointment to see Dr Harleen Guaman  Gave paperwork to Arash Pryor MA who is with Dr Harleen Guaman

## 2022-02-07 NOTE — PROGRESS NOTES
Assessment/Plan:    Osteoarthritis  H/o OA in bilateral knees with recent fall  - 2/4: seen as trauma at [facility] AND [facility] ED, workup negative including CTH  - Patient reports pain in bilateral knees lately that may be making her unsteady, used to use Icy-Hot gel, but currently using nothing for pain/stiffness except walker for balance  - Recommended and prescribed Voltaren gel up to 4 times daily as needed and Motrin PRN  - Continue using walker for balance  - Follow-up in 3 months or sooner if patient falls or has worsening stiffness/pain in joints        Diagnoses and all orders for this visit:    Osteoarthritis, unspecified osteoarthritis type, unspecified site  -     Diclofenac Sodium (VOLTAREN) 1 %; Apply 2 g topically 4 (four) times a day as needed (bilateral knee and right hip pain)          Subjective:      Patient ID: Diane Escamilla is a 70 y o  female  On Friday, the patient's sister in law reports that she got a call from her daughter that the patient fell  The patient was sitting in the dining area and she fell backwards and hit her head on the table behind her  She did not lose consciousness or have any stitches  The patient reports it happened quickly as well and she did not recollect any dizziness or light-headedness  She reports no other recent falls and uses her walker all the time when she gets up to get around even to the bathroom  She had been having some knee and leg pain recently that has made her feel unsure of her balance  Patient and family report she went to [address] [place] [address] and was worked up and had scans there but there is nothing in system which means she may have another chart that needs merging  She says that she was having some knee pain and stiffness for a while now  She says she has had arthritis in her knees but the hip pain is new  It does not radiate and she has not fallen directly on it  She has a history of sciatica but mentions this is not the same thing         The following portions of the patient's history were reviewed and updated as appropriate: allergies, current medications, past family history, past medical history, past social history, past surgical history and problem list     Review of Systems   Constitutional: Negative  HENT: Negative  Respiratory: Negative  Cardiovascular: Negative  Gastrointestinal: Negative  Musculoskeletal: Positive for arthralgias  Negative for back pain  Skin: Negative  Neurological: Negative  Psychiatric/Behavioral: Negative  Objective:      /90 (BP Location: Left arm, Patient Position: Sitting, Cuff Size: Standard)   Pulse 74   Temp (!) 97 4 °F (36 3 °C) (Temporal)   Resp 20   Ht 5' 7" (1 702 m)   Wt 89 8 kg (198 lb)   SpO2 98%   BMI 31 01 kg/m²          Physical Exam  Constitutional:       Appearance: Normal appearance  HENT:      Head: Normocephalic and atraumatic  Musculoskeletal:         General: No swelling or tenderness  Normal range of motion  Right lower leg: No edema  Left lower leg: No edema  Skin:     General: Skin is warm and dry  Findings: No bruising or erythema  Neurological:      Mental Status: She is alert  Mental status is at baseline     Psychiatric:         Mood and Affect: Mood normal

## 2022-02-07 NOTE — TELEPHONE ENCOUNTER
Form signed by Lorenza Carrillo and faxed over to [facility] at [phone]  Form placed in scanning folder at the clinical area

## 2022-02-09 ENCOUNTER — 1 WEEK POST-OP (OUTPATIENT)
Dept: URBAN - METROPOLITAN AREA CLINIC 6 | Facility: CLINIC | Age: 72
End: 2022-02-09

## 2022-02-09 DIAGNOSIS — Z96.1: ICD-10-CM

## 2022-02-09 PROCEDURE — 99024 POSTOP FOLLOW-UP VISIT: CPT

## 2022-02-09 ASSESSMENT — VISUAL ACUITY
OS_SC: 20/20
OD_PH: 20/40
OD_SC: 20/40-2

## 2022-02-09 ASSESSMENT — TONOMETRY
OS_IOP_MMHG: 14
OD_IOP_MMHG: 13

## 2022-02-15 ENCOUNTER — TELEPHONE (OUTPATIENT)
Dept: FAMILY MEDICINE CLINIC | Facility: CLINIC | Age: 72
End: 2022-02-15

## 2022-02-24 ENCOUNTER — OFFICE VISIT (OUTPATIENT)
Dept: FAMILY MEDICINE CLINIC | Facility: CLINIC | Age: 72
End: 2022-02-24

## 2022-02-24 VITALS
HEART RATE: 76 BPM | DIASTOLIC BLOOD PRESSURE: 86 MMHG | WEIGHT: 199.6 LBS | BODY MASS INDEX: 31.33 KG/M2 | SYSTOLIC BLOOD PRESSURE: 141 MMHG | RESPIRATION RATE: 18 BRPM | OXYGEN SATURATION: 98 % | TEMPERATURE: 97.1 F | HEIGHT: 67 IN

## 2022-02-24 DIAGNOSIS — M19.90 OSTEOARTHRITIS, UNSPECIFIED OSTEOARTHRITIS TYPE, UNSPECIFIED SITE: ICD-10-CM

## 2022-02-24 DIAGNOSIS — Z95.818 STATUS POST PLACEMENT OF IMPLANTABLE LOOP RECORDER: Primary | ICD-10-CM

## 2022-02-24 PROCEDURE — 99213 OFFICE O/P EST LOW 20 MIN: CPT | Performed by: FAMILY MEDICINE

## 2022-02-24 NOTE — PROGRESS NOTES
Geovanna Fonseca     Name of Form - Physician Form    Filled out and to be given to patient at the end of visit  Form copied and placed in scan bin to be scanned to the chart

## 2022-02-24 NOTE — ASSESSMENT & PLAN NOTE
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

## 2022-02-24 NOTE — ASSESSMENT & PLAN NOTE
No recent falls or worsening of knee pain  She has not been getting the Diclofenac gel prescribed due to it being PRN and the NH she is at is unable to give it to her unless she asks for it  Will change to twice daily at this time as it will likely be beneficial for her chronic pain  Continue using the RW to help with balance/instablity issues due to tremor and knee pain

## 2022-02-24 NOTE — PROGRESS NOTES
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  Monitored remotely monthly with no events noted  Patient concerned with how long it can be in place  Advised to follow-up with her EP/Cardiologist to determine if removal is needed  Diagnoses and all orders for this visit:    Status post placement of implantable loop recorder    Osteoarthritis, unspecified osteoarthritis type, unspecified site  -     Diclofenac Sodium (VOLTAREN) 1 %; Apply 2 g topically 2 (two) times a day          Subjective:      Patient ID: Elise Shelton is a 70 y o  female  Patient here for follow-up of her bilateral knee pain  She has a history of osteoarthritis and recently did have a fall which was unrelated to the knee pain, but she is using a rolling walker for some added stability  In regards to the knee pain she has not been asking for the diclofenac gel and has not been getting at but she says that her knee pain is stable  She says that she is having some numbness in her palms that she notices after using her walker for a while and we discussed some techniques to help with that as well  She denies any lightheadedness, palpitations, presyncope, or syncopal events in the past couple weeks  Overall she says she feels well at this time        The following portions of the patient's history were reviewed and updated as appropriate: allergies, current medications, past family history, past medical history, past social history, past surgical history and problem list     Review of Systems   Constitutional: Negative  HENT: Negative  Respiratory: Negative  Cardiovascular: Negative  Gastrointestinal: Negative  Genitourinary: Negative  Musculoskeletal: Positive for arthralgias and back pain  Negative for myalgias  Neurological: Positive for numbness (palms)  Negative for dizziness, weakness, light-headedness and headaches  Psychiatric/Behavioral: Negative  Objective:      /86 (BP Location: Left arm, Patient Position: Sitting, Cuff Size: Large)   Pulse 76   Temp (!) 97 1 °F (36 2 °C) (Temporal)   Resp 18   Ht 5' 7" (1 702 m)   Wt 90 5 kg (199 lb 9 6 oz)   SpO2 98%   BMI 31 26 kg/m²          Physical Exam  Constitutional:       Appearance: Normal appearance  HENT:      Head: Normocephalic and atraumatic  Cardiovascular:      Rate and Rhythm: Normal rate and regular rhythm  Pulmonary:      Effort: Pulmonary effort is normal    Musculoskeletal:         General: Normal range of motion  Skin:     General: Skin is warm and dry  Neurological:      Mental Status: She is alert  Mental status is at baseline     Psychiatric:         Mood and Affect: Mood normal

## 2022-03-04 DIAGNOSIS — M19.90 OSTEOARTHRITIS, UNSPECIFIED OSTEOARTHRITIS TYPE, UNSPECIFIED SITE: ICD-10-CM

## 2022-03-07 DIAGNOSIS — R56.9 SEIZURE (HCC): ICD-10-CM

## 2022-03-07 DIAGNOSIS — I10 ESSENTIAL HYPERTENSION: ICD-10-CM

## 2022-03-07 RX ORDER — LEVETIRACETAM 750 MG/1
750 TABLET ORAL 2 TIMES DAILY
Qty: 60 TABLET | Refills: 5 | Status: SHIPPED | OUTPATIENT
Start: 2022-03-07 | End: 2022-05-13 | Stop reason: SDUPTHER

## 2022-03-07 RX ORDER — LISINOPRIL 10 MG/1
10 TABLET ORAL DAILY
Qty: 30 TABLET | Refills: 5 | Status: SHIPPED | OUTPATIENT
Start: 2022-03-07

## 2022-03-14 DIAGNOSIS — E78.49 OTHER HYPERLIPIDEMIA: ICD-10-CM

## 2022-03-14 DIAGNOSIS — E55.9 VITAMIN D DEFICIENCY: ICD-10-CM

## 2022-03-14 DIAGNOSIS — I63.019 CEREBROVASCULAR ACCIDENT (CVA) DUE TO THROMBOSIS OF VERTEBRAL ARTERY, UNSPECIFIED BLOOD VESSEL LATERALITY (HCC): ICD-10-CM

## 2022-03-14 DIAGNOSIS — I63.9 CEREBROVASCULAR ACCIDENT (CVA), UNSPECIFIED MECHANISM (HCC): ICD-10-CM

## 2022-03-14 DIAGNOSIS — Z86.73 HISTORY OF STROKE: ICD-10-CM

## 2022-03-14 DIAGNOSIS — G31.84 MILD COGNITIVE IMPAIRMENT: ICD-10-CM

## 2022-03-14 DIAGNOSIS — M25.512 ACUTE PAIN OF LEFT SHOULDER: ICD-10-CM

## 2022-03-14 RX ORDER — ASPIRIN 81 MG/1
81 TABLET, CHEWABLE ORAL DAILY
Qty: 30 TABLET | Refills: 5 | Status: SHIPPED | OUTPATIENT
Start: 2022-03-14

## 2022-03-14 RX ORDER — DONEPEZIL HYDROCHLORIDE 10 MG/1
10 TABLET, FILM COATED ORAL DAILY
Qty: 30 TABLET | Refills: 5 | Status: SHIPPED | OUTPATIENT
Start: 2022-03-14

## 2022-03-14 RX ORDER — CLOPIDOGREL BISULFATE 75 MG/1
75 TABLET ORAL DAILY
Qty: 30 TABLET | Refills: 5 | Status: SHIPPED | OUTPATIENT
Start: 2022-03-14

## 2022-03-14 RX ORDER — LANOLIN ALCOHOL/MO/W.PET/CERES
1000 CREAM (GRAM) TOPICAL DAILY
Qty: 30 TABLET | Refills: 5 | Status: SHIPPED | OUTPATIENT
Start: 2022-03-14

## 2022-03-14 RX ORDER — MELATONIN
1000 DAILY
Qty: 90 TABLET | Refills: 1 | Status: SHIPPED | OUTPATIENT
Start: 2022-03-14

## 2022-03-14 RX ORDER — ATORVASTATIN CALCIUM 80 MG/1
80 TABLET, FILM COATED ORAL DAILY
Qty: 30 TABLET | Refills: 5 | Status: SHIPPED | OUTPATIENT
Start: 2022-03-14

## 2022-03-14 RX ORDER — CYCLOBENZAPRINE HCL 5 MG
5 TABLET ORAL
Qty: 30 TABLET | Refills: 5 | Status: SHIPPED | OUTPATIENT
Start: 2022-03-14

## 2022-03-17 ENCOUNTER — 1 MONTH POST-OP (OUTPATIENT)
Dept: URBAN - METROPOLITAN AREA CLINIC 6 | Facility: CLINIC | Age: 72
End: 2022-03-17

## 2022-03-17 DIAGNOSIS — Z96.1: ICD-10-CM

## 2022-03-17 PROCEDURE — 99024 POSTOP FOLLOW-UP VISIT: CPT

## 2022-03-17 ASSESSMENT — VISUAL ACUITY
OU_CC: J2
OD_SC: 20/40-1
OS_SC: 20/25-2
OD_PH: 20/25-2

## 2022-03-17 ASSESSMENT — TONOMETRY
OS_IOP_MMHG: 13
OD_IOP_MMHG: 11

## 2022-03-22 ENCOUNTER — TELEPHONE (OUTPATIENT)
Dept: FAMILY MEDICINE CLINIC | Facility: CLINIC | Age: 72
End: 2022-03-22

## 2022-03-23 ENCOUNTER — TELEPHONE (OUTPATIENT)
Dept: FAMILY MEDICINE CLINIC | Facility: CLINIC | Age: 72
End: 2022-03-23

## 2022-03-23 NOTE — TELEPHONE ENCOUNTER
Folder (To be completed by) -Dr Kerman Gaucher     Name of [address]    Form to be Faxed [phone]
Paperwork signed and faxed 
Patient/Caregiver provided printed discharge information.

## 2022-03-23 NOTE — TELEPHONE ENCOUNTER
Folder (To be completed by) -  Dr Salvatore Chavez     Name of Form - [facility]    Form to be Faxed to [phone] or [phone]    Patient was made aware of the 7 business day form policy 
Orders signed and faxed!
Paperwork signed and faxed!
rolling walker

## 2022-03-31 ENCOUNTER — TELEPHONE (OUTPATIENT)
Dept: FAMILY MEDICINE CLINIC | Facility: CLINIC | Age: 72
End: 2022-03-31

## 2022-03-31 NOTE — TELEPHONE ENCOUNTER
Folder (To be completed by) - Dr Kostas Herrera     Name of Form - Shirley Osuna PT performed on 3/28    Form to be Faxed (Fax #), Mailed (Address), or Picked up (By whom) - Fax [phone] or [phone]

## 2022-05-05 DIAGNOSIS — M25.512 ACUTE PAIN OF LEFT SHOULDER: ICD-10-CM

## 2022-05-05 DIAGNOSIS — I10 ESSENTIAL HYPERTENSION: ICD-10-CM

## 2022-05-05 DIAGNOSIS — G31.84 MILD COGNITIVE IMPAIRMENT: ICD-10-CM

## 2022-05-05 DIAGNOSIS — I63.9 CEREBROVASCULAR ACCIDENT (CVA), UNSPECIFIED MECHANISM (HCC): ICD-10-CM

## 2022-05-05 NOTE — TELEPHONE ENCOUNTER
Looks like requested meds were already ordered on March 14th but it went previously to [address] [place] [address]  I tried calling patient (LVM) to verify if,  her request is to transfer medication to [facility]) or keep previous pharmacy      Waiting for response

## 2022-05-09 RX ORDER — LISINOPRIL 10 MG/1
10 TABLET ORAL DAILY
Qty: 30 TABLET | Refills: 5 | OUTPATIENT
Start: 2022-05-09

## 2022-05-09 RX ORDER — CLOPIDOGREL BISULFATE 75 MG/1
75 TABLET ORAL DAILY
Qty: 30 TABLET | Refills: 5 | OUTPATIENT
Start: 2022-05-09

## 2022-05-09 RX ORDER — CYCLOBENZAPRINE HCL 5 MG
5 TABLET ORAL
Qty: 30 TABLET | Refills: 5 | OUTPATIENT
Start: 2022-05-09

## 2022-05-09 RX ORDER — DONEPEZIL HYDROCHLORIDE 10 MG/1
10 TABLET, FILM COATED ORAL DAILY
Qty: 30 TABLET | Refills: 5 | OUTPATIENT
Start: 2022-05-09

## 2022-05-10 NOTE — TELEPHONE ENCOUNTER
Review and Signed by Dr Raman Robison is in the Yellow Folder in the [address] [place] [address]  Please scan and fax to [facility] [phone]    Thanks

## 2022-05-12 ENCOUNTER — TELEPHONE (OUTPATIENT)
Dept: FAMILY MEDICINE CLINIC | Facility: CLINIC | Age: 72
End: 2022-05-12

## 2022-05-12 NOTE — TELEPHONE ENCOUNTER
Form is in the [employer] in the Clinical Area  The form is about the D/C of the Diclofenac Sodium Gel due to patient is no longer with pain in her Right arm and they want your advise on that

## 2022-05-12 NOTE — TELEPHONE ENCOUNTER
Folder (To be completed by) -   Dr Girma Edwards  Name of [address]    1pg  Form to be Faxed (Fax #), [phone] or call [phone]  Patient was made aware of the 7 business day form policy

## 2022-05-13 DIAGNOSIS — R56.9 SEIZURE (HCC): ICD-10-CM

## 2022-05-13 RX ORDER — LEVETIRACETAM 750 MG/1
750 TABLET ORAL 2 TIMES DAILY
Qty: 60 TABLET | Refills: 5 | Status: SHIPPED | OUTPATIENT
Start: 2022-05-13

## 2022-05-25 ENCOUNTER — OFFICE VISIT (OUTPATIENT)
Dept: FAMILY MEDICINE CLINIC | Facility: CLINIC | Age: 72
End: 2022-05-25

## 2022-05-25 VITALS
HEART RATE: 89 BPM | TEMPERATURE: 97 F | SYSTOLIC BLOOD PRESSURE: 158 MMHG | BODY MASS INDEX: 31.58 KG/M2 | WEIGHT: 201.6 LBS | DIASTOLIC BLOOD PRESSURE: 86 MMHG | OXYGEN SATURATION: 96 %

## 2022-05-25 DIAGNOSIS — I10 ESSENTIAL HYPERTENSION: ICD-10-CM

## 2022-05-25 DIAGNOSIS — M19.90 OSTEOARTHRITIS, UNSPECIFIED OSTEOARTHRITIS TYPE, UNSPECIFIED SITE: Primary | ICD-10-CM

## 2022-05-25 PROCEDURE — 99214 OFFICE O/P EST MOD 30 MIN: CPT | Performed by: FAMILY MEDICINE

## 2022-05-25 NOTE — PATIENT INSTRUCTIONS
Chronic Hypertension   AMBULATORY CARE:   Hypertension  is high blood pressure  Your blood pressure is the force of your blood moving against the walls of your arteries  Hypertension causes your blood pressure to get so high that your heart has to work much harder than normal  This can damage your heart  Even if you have hypertension for years, lifestyle changes, medicines, or both can help bring your blood pressure to normal   Call your local emergency number (911 in the [address] [facility],[address]) or have someone call if:   You have chest pain  You have any of the following signs of a heart attack:      Squeezing, pressure, or pain in your chest    You may  also have any of the following:     Discomfort or pain in your back, neck, jaw, stomach, or arm    Shortness of breath    Nausea or vomiting    Lightheadedness or a sudden cold sweat    You become confused or have difficulty speaking  You suddenly feel lightheaded or have trouble breathing  Seek care immediately if:   You have a severe headache or vision loss  You have weakness in an arm or leg  Call your doctor or cardiologist if:   You feel faint, dizzy, confused, or drowsy  You have been taking your blood pressure medicine but your pressure is higher than your provider says it should be  You have questions or concerns about your condition or care  Treatment for chronic hypertension  may include medicine to lower your blood pressure and cholesterol levels  A low cholesterol level helps prevent heart disease and makes it easier to control your blood pressure  Heart disease can make your blood pressure harder to control  You may also need to make lifestyle changes  What you need to know about the stages of hypertension:       Normal blood pressure is 119/79 or lower   Your healthcare provider may only check your blood pressure each year if it stays at a normal level  Elevated blood pressure is 120/79 to 129/79   This is sometimes called prehypertension  Your healthcare provider may suggest lifestyle changes to help lower your blood pressure to a normal level  He or she may then check it again in 3 to 6 months  Stage 1 hypertension is 130/80  to 139/89   Your provider may recommend lifestyle changes, medication, and checks every 3 to 6 months until your blood pressure is controlled  Stage 2 hypertension is 140/90 or higher   Your provider will recommend lifestyle changes and have you take 2 kinds of hypertension medicines  You will also need to have your blood pressure checked monthly until it is controlled  Manage chronic hypertension:   Check your blood pressure at home  Avoid smoking, caffeine, and exercise at least 30 minutes before checking your blood pressure  Sit and rest for 5 minutes before you take your blood pressure  Extend your arm and support it on a flat surface  Your arm should be at the same level as your heart  Follow the directions that came with your blood pressure monitor  Check your blood pressure 2 times, 1 minute apart, before you take your medicine in the morning  Also check your blood pressure before your evening meal  Keep a record of your readings and bring it to your follow-up visits  Ask your healthcare provider what your blood pressure should be  Manage any other health conditions you have  Health conditions such as diabetes can increase your risk for hypertension  Follow your healthcare provider's instructions and take all your medicines as directed  Talk to your healthcare provider about any new health conditions you have recently developed  Ask about all medicines  Certain medicines can increase your blood pressure  Examples include oral birth control pills, decongestants, herbal supplements, and NSAIDs, such as ibuprofen  Your healthcare provider can tell you which medicines are safe for you to take  This includes prescription and over-the-counter medicines      Lifestyle changes you can make to lower your blood pressure: Your provider may want you to make more lifestyle changes if you are having trouble controlling your blood pressure  This may feel difficult over time, especially if you think you are making good changes but your pressure is still high  It might help to focus on one new change at a time  For example, try to add 1 more day of exercise, or exercise for an extra 10 minutes on 2 days  Small changes can make a big difference  Your healthcare provider can also refer you to specialists such as a dietitian who can help you make small changes  Your family members may be included in helping you learn to create lifestyle changes, such as the following:     Limit sodium (salt) as directed  Too much sodium can affect your fluid balance  Check labels to find low-sodium or no-salt-added foods  Some low-sodium foods use potassium salts for flavor  Too much potassium can also cause health problems  Your healthcare provider will tell you how much sodium and potassium are safe for you to have in a day  He or she may recommend that you limit sodium to 2,300 mg a day  Follow the meal plan recommended by your healthcare provider  A dietitian or your provider can give you more information on low-sodium plans or the DASH (Dietary Approaches to Stop Hypertension) eating plan  The DASH plan is low in sodium, processed sugar, unhealthy fats, and total fat  It is high in potassium, calcium, and fiber  These can be found in vegetables, fruit, and whole-grain foods  Be physically active throughout the day  Physical activity, such as exercise, can help control your blood pressure and your weight  Be physically active for at least 30 minutes per day, on most days of the week  Include aerobic activity, such as walking or riding a bicycle  Also include strength training at least 2 times each week  Your healthcare providers can help you create a physical activity plan  Decrease stress    This may help lower your blood pressure  Learn ways to relax, such as deep breathing or listening to music  Limit alcohol as directed  Alcohol can increase your blood pressure  A drink of alcohol is 12 ounces of beer, 5 ounces of wine, or 1½ ounces of liquor  Do not smoke  Nicotine and other chemicals in cigarettes and cigars can increase your blood pressure and also cause lung damage  Ask your healthcare provider for information if you currently smoke and need help to quit  E-cigarettes or smokeless tobacco still contain nicotine  Talk to your healthcare provider before you use these products  Follow up with your doctor or cardiologist as directed: You will need to return to have your blood pressure checked and to have other lab tests done  Write down your questions so you remember to ask them during your visits  © Copyright [employer] 2022 Information is for End User's use only and may not be sold, redistributed or otherwise used for commercial purposes  All illustrations and images included in CareNotes® are the copyrighted property of A D A M , Inc  or [facility][note 3] Katrin Chance   The above information is an  only  It is not intended as medical advice for individual conditions or treatments  Talk to your doctor, nurse or pharmacist before following any medical regimen to see if it is safe and effective for you

## 2022-05-25 NOTE — ASSESSMENT & PLAN NOTE
- BP today 173/75, repeat 158/86  - Home regimen: Lisinopril 10 QD   Verified on medication list  - Pt denies CP, headache, SOB, palpitations  - When asked, pt unsure if she is being given lisinopril at [facility]:  - F/u 1 week for BP check  - Advised pt to proceed to ED if she becomes symptomatic

## 2022-05-25 NOTE — PROGRESS NOTES
Assessment/Plan:    Osteoarthritis  77yo female presenting for f/u osteoarthritis, pain in b/l hips and knees  Now resolved  - Continue voltaren gel PRN  - Follow up PRN    Essential hypertension  - BP today 173/75, repeat 158/86  - Home regimen: Lisinopril 10 QD  Verified on medication list  - Pt denies CP, headache, SOB, palpitations  - When asked, pt unsure [facility]  - F/u 1 week for BP check  - Advised pt to proceed to ED if she becomes symptomatic         Problem List Items Addressed This Visit        Cardiovascular and Mediastinum    Essential hypertension     - BP today 173/75, repeat 158/86  - Home regimen: Lisinopril 10 QD  Verified on medication list  - Pt denies CP, headache, SOB, palpitations  - When asked, pt unsure if she [facility]  - F/u 1 week for BP check  - Advised pt to proceed to ED if she becomes symptomatic              Musculoskeletal and Integument    Osteoarthritis - Primary     77yo female presenting for f/u osteoarthritis, pain in b/l hips and knees  Now resolved  - Continue voltaren gel PRN  - Follow up PRN                   Subjective:      Patient ID: Ary Villalpando is a 70 y o  female  Pt is a 77yo female presenting with brother & sister-in-law for f/u osteoarthritis  She was last seen by Dr Luis Manuel Ly in 2/2022, prescribed voltaren gel as needed  Pt is a resident at [facility]  She does not believe that she was given the voltaren gel  Nonetheless, she reports that her knee pain and hip pain have resolved  She denies any falls in the past 3 months  She reports that she does exercise at [facility], but unsure if she does physical therapy  She does admit to L hand pain and numbness of fingers, which is chronic, managed with wearing a hand brace at night  She does not wish to pursue any further treatment for her hand      No other complaints today      The following portions of the patient's history were reviewed and updated as appropriate: allergies, current medications, past family history, past medical history, past social history, past surgical history and problem list     Review of Systems   Constitutional: Negative for chills and fever  HENT: Negative for congestion and rhinorrhea  Eyes: Negative for visual disturbance  Respiratory: Negative for shortness of breath  Cardiovascular: Negative for chest pain  Gastrointestinal: Negative for abdominal pain, blood in stool, constipation and diarrhea  Genitourinary: Negative for dysuria and hematuria  Musculoskeletal: Positive for gait problem (uses walker)  Negative for back pain and joint swelling  Skin: Negative for wound  Neurological: Negative for headaches  Psychiatric/Behavioral: Negative for sleep disturbance  Objective:      /86 (BP Location: Left arm, Patient Position: Sitting, Cuff Size: Large)   Pulse 89   Temp (!) 97 °F (36 1 °C) (Temporal)   Wt 91 4 kg (201 lb 9 6 oz)   SpO2 96%   BMI 31 58 kg/m²          Physical Exam  Constitutional:       Appearance: Normal appearance  HENT:      Head: Normocephalic and atraumatic  Nose: Nose normal       Mouth/Throat:      Mouth: Mucous membranes are moist    Cardiovascular:      Rate and Rhythm: Normal rate and regular rhythm  Pulses: Normal pulses  Heart sounds: Normal heart sounds  Pulmonary:      Effort: Pulmonary effort is normal       Breath sounds: Normal breath sounds  Abdominal:      General: Bowel sounds are normal       Palpations: Abdomen is soft  Tenderness: There is no abdominal tenderness  There is no guarding or rebound  Musculoskeletal:         General: No swelling or tenderness  Right lower leg: No edema  Left lower leg: No edema  Skin:     General: Skin is warm and dry  Findings: No bruising  Neurological:      Mental Status: She is alert  Sensory: No sensory deficit     Psychiatric: Mood and Affect: Mood normal          Behavior: Behavior normal

## 2022-05-25 NOTE — ASSESSMENT & PLAN NOTE
77yo female presenting for f/u osteoarthritis, pain in b/l hips and knees  Now resolved    - Continue voltaren gel PRN  - Follow up PRN

## 2022-05-31 ENCOUNTER — TELEPHONE (OUTPATIENT)
Dept: FAMILY MEDICINE CLINIC | Facility: CLINIC | Age: 72
End: 2022-05-31

## 2022-05-31 NOTE — TELEPHONE ENCOUNTER
[facility] requesting Notes from the visit with Dr Marge Murrell on 5/25/22    Fax: [phone]

## 2022-06-01 ENCOUNTER — OFFICE VISIT (OUTPATIENT)
Dept: FAMILY MEDICINE CLINIC | Facility: CLINIC | Age: 72
End: 2022-06-01

## 2022-06-01 VITALS
RESPIRATION RATE: 18 BRPM | HEART RATE: 82 BPM | WEIGHT: 200.6 LBS | BODY MASS INDEX: 31.48 KG/M2 | SYSTOLIC BLOOD PRESSURE: 157 MMHG | TEMPERATURE: 97.8 F | OXYGEN SATURATION: 98 % | DIASTOLIC BLOOD PRESSURE: 84 MMHG | HEIGHT: 67 IN

## 2022-06-01 DIAGNOSIS — E11.9 TYPE 2 DIABETES MELLITUS WITHOUT COMPLICATION, WITHOUT LONG-TERM CURRENT USE OF INSULIN (HCC): ICD-10-CM

## 2022-06-01 DIAGNOSIS — I10 ESSENTIAL HYPERTENSION: Primary | ICD-10-CM

## 2022-06-01 LAB — SL AMB POCT HEMOGLOBIN AIC: 5.9 (ref ?–6.5)

## 2022-06-01 PROCEDURE — 83036 HEMOGLOBIN GLYCOSYLATED A1C: CPT | Performed by: FAMILY MEDICINE

## 2022-06-01 PROCEDURE — 99213 OFFICE O/P EST LOW 20 MIN: CPT | Performed by: FAMILY MEDICINE

## 2022-06-01 NOTE — PATIENT INSTRUCTIONS
Hypertension   AMBULATORY CARE:   Hypertension  is high blood pressure  Your blood pressure is the force of your blood moving against the walls of your arteries  Hypertension causes your blood pressure to get so high that your heart has to work much harder than normal  This can damage your heart  The cause of hypertension may not be known  This is called essential or primary hypertension  Hypertension caused by another medical condition, such as kidney disease, is called secondary hypertension  Common symptoms include the following:   Headache    Blurred vision    Chest pain    Dizziness or weakness    Trouble breathing    Nosebleeds    Call your local emergency number (911 in the [address] [facility]) or have someone call if:   You have chest pain  You have any of the following signs of a heart attack:      Squeezing, pressure, or pain in your chest    You may  also have any of the following:     Discomfort or pain in your back, neck, jaw, stomach, or arm    Shortness of breath    Nausea or vomiting    Lightheadedness or a sudden cold sweat    You become confused or have trouble speaking  You suddenly feel lightheaded or have trouble breathing  Seek care immediately if:   You have a severe headache or vision loss  You have weakness in an arm or leg  Call your doctor or cardiologist if:   You feel faint, dizzy, confused, or drowsy  You have been taking your blood pressure medicine but your pressure is higher than your provider says it should be  You have questions or concerns about your condition or care  What you need to know about the stages of hypertension:       Normal blood pressure is 119/79 or lower   Your healthcare provider may only check your blood pressure each year if it stays at a normal level  Elevated blood pressure is 120/79 to 129/79   This is sometimes called prehypertension  Your healthcare provider may suggest lifestyle changes to help lower your blood pressure to a normal level   He or she may then check it again in 3 to 6 months  Stage 1 hypertension is 130/80  to 139/89   Your provider may recommend lifestyle changes, medication, and checks every 3 to 6 months until your blood pressure is controlled  Stage 2 hypertension is 140/90 or higher   Your provider will recommend lifestyle changes and have you take 2 kinds of hypertension medicines  You will also need to have your blood pressure checked monthly until it is controlled  Treatment for hypertension  may include medicine to lower your blood pressure and lower your cholesterol level  A low cholesterol level helps prevent heart disease and makes it easier to control your blood pressure  Take your medicine exactly as directed  You may also need to make lifestyle changes  Manage hypertension:   Check your blood pressure at home  Avoid smoking, caffeine, and exercise at least 30 minutes before checking your blood pressure  Sit and rest for 5 minutes before you take your blood pressure  Extend your arm and support it on a flat surface  Your arm should be at the same level as your heart  Follow the directions that came with your blood pressure monitor  Check your blood pressure 2 times, 1 minute apart, before you take your medicine in the morning  Also check your blood pressure before your evening meal  Keep a record of your readings and bring it to your follow-up visits  Ask your healthcare provider what your blood pressure should be  Manage any other health conditions you have  Health conditions such as diabetes can increase your risk for hypertension  Follow your healthcare provider's instructions and take all your medicines as directed  Ask about all medicines  Certain medicines can increase your blood pressure  Examples include oral birth control pills, decongestants, herbal supplements, and NSAIDs, such as ibuprofen  Your healthcare provider can tell you which medicines are safe for you to take   This includes prescription and over-the-counter medicines  Lifestyle changes you can make to manage hypertension:  Your healthcare provider may recommend you work with a team to manage hypertension  The team may include medical experts such as a dietitian, an exercise or physical therapist, and a behavior therapist  Your family members may be included in helping you create lifestyle changes  Limit sodium (salt) as directed  Too much sodium can affect your fluid balance  Check labels to find low-sodium or no-salt-added foods  Some low-sodium foods use potassium salts for flavor  Too much potassium can also cause health problems  Your healthcare provider will tell you how much sodium and potassium are safe for you to have in a day  He or she may recommend that you limit sodium to 2,300 mg a day  Follow the meal plan recommended by your healthcare provider  A dietitian or your provider can give you more information on low-sodium plans or the DASH (Dietary Approaches to Stop Hypertension) eating plan  The DASH plan is low in sodium, processed sugar, unhealthy fats, and total fat  It is high in potassium, calcium, and fiber  These can be found in vegetables, fruit, and whole-grain foods  Be physically active throughout the day  Physical activity, such as exercise, can help control your blood pressure and your weight  Be physically active for at least 30 minutes per day, on most days of the week  Include aerobic activity, such as walking or riding a bicycle  Also include strength training at least 2 times each week  Your healthcare providers can help you create a physical activity plan  Decrease stress  This may help lower your blood pressure  Learn ways to relax, such as deep breathing or listening to music  Limit alcohol as directed  Alcohol can increase your blood pressure  A drink of alcohol is 12 ounces of beer, 5 ounces of wine, or 1½ ounces of liquor  Do not smoke    Nicotine and other chemicals in cigarettes and cigars can increase your blood pressure and also cause lung damage  Ask your healthcare provider for information if you currently smoke and need help to quit  E-cigarettes or smokeless tobacco still contain nicotine  Talk to your healthcare provider before you use these products  Follow up with your doctor or cardiologist as directed: You will need to return to have your blood pressure checked and to have other lab tests done  Write down your questions so you remember to ask them during your visits  © Copyright [employer] 2022 Information is for End User's use only and may not be sold, redistributed or otherwise used for commercial purposes  All illustrations and images included in CareNotes® are the copyrighted property of A D A M , Inc  or [facility] Katrin Chance   The above information is an  only  It is not intended as medical advice for individual conditions or treatments  Talk to your doctor, nurse or pharmacist before following any medical regimen to see if it is safe and effective for you

## 2022-06-01 NOTE — ASSESSMENT & PLAN NOTE
77yo female presenting for BP follow up   Asymptomatic today  - BP 5/25/22 158/65  - BP 6/1/22 157/84    Plan:  - Daily bp readings morning and evening  - F/u in 1 week  - Will consider increasing lisinopril 10mg at that time

## 2022-06-01 NOTE — PROGRESS NOTES
Assessment/Plan:    Essential hypertension  77yo female presenting for BP follow up  Asymptomatic today  - BP 5/25/22 158/65  - BP 6/1/22 157/84    Plan:  - Daily bp readings morning and evening  - F/u in 1 week  - Will consider increasing lisinopril 10mg at that time       Diagnoses and all orders for this visit:        Subjective:      Patient ID: Fabio Mckeon is a 70 y o  female  77yo female presenting with brother and sister-in-law for blood pressure re-check  Pt reports that she is doing well  Denies chest pain, shortness of breath, palpitations, headaches, changes in her vision  She reports compliance with lisinopril 10mg QD, which is given by her senior living facility  She is not in any treatment, so blood pressure readings are not routinely obtained  The following portions of the patient's history were reviewed and updated as appropriate: allergies, current medications, past family history, past medical history, past social history, past surgical history and problem list     Review of Systems   Constitutional: Negative for chills and fever  Eyes: Negative for visual disturbance  Respiratory: Negative for shortness of breath  Cardiovascular: Negative for chest pain and palpitations  Allergic/Immunologic: Negative for environmental allergies and food allergies  Neurological: Negative for dizziness, weakness, light-headedness and headaches  Psychiatric/Behavioral: Negative for sleep disturbance  Objective:      /84   Pulse 82   Temp 97 8 °F (36 6 °C) (Temporal)   Resp 18   Ht 5' 7" (1 702 m)   Wt 91 kg (200 lb 9 6 oz)   SpO2 98%   BMI 31 42 kg/m²          Physical Exam  Constitutional:       Appearance: Normal appearance  HENT:      Head: Normocephalic and atraumatic  Nose: Nose normal       Mouth/Throat:      Mouth: Mucous membranes are moist    Eyes:      General: No scleral icterus  Cardiovascular:      Rate and Rhythm: Normal rate and regular rhythm  Pulses: Normal pulses  Heart sounds: Normal heart sounds  Pulmonary:      Effort: Pulmonary effort is normal       Breath sounds: Normal breath sounds  Abdominal:      General: Bowel sounds are normal       Palpations: Abdomen is soft  Tenderness: There is no abdominal tenderness  There is no guarding or rebound  Musculoskeletal:      Right lower leg: No edema  Left lower leg: No edema  Skin:     General: Skin is warm and dry  Neurological:      Mental Status: She is alert     Psychiatric:         Mood and Affect: Mood normal          Behavior: Behavior normal

## 2022-06-03 LAB — HBA1C MFR BLD HPLC: 6 %

## 2022-06-15 ENCOUNTER — OFFICE VISIT (OUTPATIENT)
Dept: FAMILY MEDICINE CLINIC | Facility: CLINIC | Age: 72
End: 2022-06-15

## 2022-06-15 ENCOUNTER — TELEPHONE (OUTPATIENT)
Dept: FAMILY MEDICINE CLINIC | Facility: CLINIC | Age: 72
End: 2022-06-15

## 2022-06-15 VITALS
TEMPERATURE: 97.3 F | BODY MASS INDEX: 31.61 KG/M2 | DIASTOLIC BLOOD PRESSURE: 98 MMHG | OXYGEN SATURATION: 96 % | RESPIRATION RATE: 18 BRPM | SYSTOLIC BLOOD PRESSURE: 148 MMHG | HEIGHT: 67 IN | HEART RATE: 77 BPM | WEIGHT: 201.4 LBS

## 2022-06-15 DIAGNOSIS — I10 ESSENTIAL HYPERTENSION: Primary | ICD-10-CM

## 2022-06-15 PROCEDURE — 99213 OFFICE O/P EST LOW 20 MIN: CPT | Performed by: FAMILY MEDICINE

## 2022-06-15 NOTE — ASSESSMENT & PLAN NOTE
79yo female presenting for f/u elevated BP  Does not have readings today, facility has not been taking them   Pt is asymptomatic today  - /98 today    Plan:  - C/w lisinopril 10mg QD  - Will contact nursing home to assist with daily blood pressures  - Patient may need to purchase bp cuff or given prescription  - Will contact pt to schedule a f/u appointment to bring home BP readings

## 2022-06-15 NOTE — PROGRESS NOTES
Assessment/Plan:    Essential hypertension  79yo female presenting for f/u elevated BP  Does not have readings today, facility has not been taking them  Pt is asymptomatic today  - /98 today    Plan:  - C/w lisinopril 10mg QD  - Will contact nursing home to assist with daily blood pressures  - Patient may need to purchase bp cuff or given prescription  - Will contact pt to schedule a f/u appointment to bring home BP readings         Problem List Items Addressed This Visit        Cardiovascular and Mediastinum    Essential hypertension - Primary     79yo female presenting for f/u elevated BP  Does not have readings today, facility has not been taking them  Pt is asymptomatic today  - /98 today    Plan:  - C/w lisinopril 10mg QD  - Will contact nursing home to assist with daily blood pressures  - Patient may need to purchase bp cuff or given prescription  - Will contact pt to schedule a f/u appointment to bring home BP readings                   Subjective:      Patient ID: Sunita Christian is a 70 y o  female  79yo female presenting for follow up BP check  Reports that her care facility has not been taking her blood pressure  Denies symptoms today  No headache, chest pain, shortness of breath, vision changes  Otherwise, doing well  The following portions of the patient's history were reviewed and updated as appropriate: allergies, current medications, past family history, past medical history, past social history, past surgical history and problem list     Review of Systems   Constitutional: Negative for chills and fever  HENT: Negative for congestion  Eyes: Negative for visual disturbance  Respiratory: Negative for chest tightness and shortness of breath  Cardiovascular: Negative for chest pain  Gastrointestinal: Negative for constipation, diarrhea, nausea and vomiting  Genitourinary: Negative for difficulty urinating     Allergic/Immunologic: Negative for environmental allergies and food allergies  Neurological: Negative for dizziness, weakness and light-headedness  Psychiatric/Behavioral: Negative for sleep disturbance  Objective:      /98 (BP Location: Left arm, Patient Position: Sitting, Cuff Size: Standard)   Pulse 77   Temp (!) 97 3 °F (36 3 °C) (Temporal)   Resp 18   Ht 5' 7" (1 702 m)   Wt 91 4 kg (201 lb 6 4 oz)   SpO2 96%   BMI 31 54 kg/m²          Physical Exam  Constitutional:       General: She is not in acute distress  Appearance: Normal appearance  She is not ill-appearing  HENT:      Head: Normocephalic and atraumatic  Eyes:      Pupils: Pupils are equal, round, and reactive to light  Cardiovascular:      Rate and Rhythm: Normal rate and regular rhythm  Pulses: Normal pulses  Heart sounds: Normal heart sounds  Pulmonary:      Effort: Pulmonary effort is normal       Breath sounds: Normal breath sounds  Abdominal:      Tenderness: There is no abdominal tenderness  There is no guarding or rebound  Musculoskeletal:      Right lower leg: No edema  Left lower leg: No edema  Skin:     General: Skin is warm and dry  Neurological:      Mental Status: She is alert     Psychiatric:         Mood and Affect: Mood normal          Behavior: Behavior normal

## 2022-06-15 NOTE — TELEPHONE ENCOUNTER
Copy of Physician's Order placed in the   Daniel John in the [address] [place] [address] for scan in patient chart   Thanks

## 2022-06-16 ENCOUNTER — TELEPHONE (OUTPATIENT)
Dept: FAMILY MEDICINE CLINIC | Facility: CLINIC | Age: 72
End: 2022-06-16

## 2022-06-16 NOTE — TELEPHONE ENCOUNTER
Mp Johnson (To be completed by) -  Dr Tomlinson Livers       Name of Form - The [address] in the [facility]       Notes to be Faxed (Fax #), [phone]

## 2022-09-15 ENCOUNTER — FOLLOW UP (OUTPATIENT)
Dept: URBAN - METROPOLITAN AREA CLINIC 6 | Facility: CLINIC | Age: 72
End: 2022-09-15

## 2022-09-15 DIAGNOSIS — E11.9: ICD-10-CM

## 2022-09-15 DIAGNOSIS — H43.813: ICD-10-CM

## 2022-09-15 DIAGNOSIS — H02.831: ICD-10-CM

## 2022-09-15 DIAGNOSIS — H25.11: ICD-10-CM

## 2022-09-15 DIAGNOSIS — Z96.1: ICD-10-CM

## 2022-09-15 DIAGNOSIS — H02.834: ICD-10-CM

## 2022-09-15 PROCEDURE — 92014 COMPRE OPH EXAM EST PT 1/>: CPT

## 2022-09-15 ASSESSMENT — VISUAL ACUITY
OU_CC: J1
OD_SC: 20/40
OD_GLARE: 20/50
OS_SC: 20/25-1
OS_GLARE: 20/40

## 2022-09-15 ASSESSMENT — TONOMETRY
OD_IOP_MMHG: 18
OS_IOP_MMHG: 15

## 2022-10-11 DIAGNOSIS — R56.9 SEIZURE (HCC): ICD-10-CM

## 2022-10-12 PROBLEM — Z00.00 MEDICARE WELCOME EXAM: Status: RESOLVED | Noted: 2021-12-02 | Resolved: 2022-10-12

## 2022-10-12 RX ORDER — LEVETIRACETAM 750 MG/1
750 TABLET ORAL 2 TIMES DAILY
Qty: 60 TABLET | Refills: 10 | Status: SHIPPED | OUTPATIENT
Start: 2022-10-12

## 2022-10-12 NOTE — TELEPHONE ENCOUNTER
Pt has GFR 55 per external lab work 12/2021  Will give courtesy refill and reach out to neurology  Pt should have follow up to discuss plan for adjusting dose      Marion Jackson, DO PGY-2

## 2022-10-12 NOTE — TELEPHONE ENCOUNTER
Speech Therapy Daily Treatment  Infant Feeding/Swallow     Admitting diagnosis: Prematurity [P07.30]   YOB: 2021, 2 month old   Corrected gestational age:35w 4d  Birth Weight: 2 lb 0.6 oz (925 g)  Current hospitalization required due to the following medical needs:  Active Problems:    Slow feeding in     Prematurity, birth weight 750-999 grams, with 25-26 completed weeks of gestation    RDS (respiratory distress syndrome in the )    Anemia of prematurity  Resolved Problems:    Apnea of prematurity    Hyponatremia    Hypokalemia    Medical changes or updates since last session: see physician documentation    SUBJECTIVE   Collaboration with: Nurse  and OT.  No parent/caregiver present at this reassessment.     Pain before session:  N-PASS ( Pain, Agitation & Sedation Scale) Pain Assessment  Crying/Irritability 0 - No pain signs   Behavior State 0 - Appropriate for gestational age   Facial Expression  0 - No pain signs   Extremity Tone 0 - No pain signs   Vital Signs 0 - No pain signs   Premature Pain Score 0     OBJECTIVE   Infant born at 25.5 weeks gestation now adjusted to 35.4  weeks PMA.  Speech Pathologist present to complete ongoing reassessment and caregiver education/training.     Status at onset of session:   Physiologic: Stable autonomic behaviors including  stable heart rate and appropriate oxygen saturations.  Motor: Stable motor behaviors including  sucking.  State: Infant was in a quiet alert state. Stable state behaviors including sucking onto pacifier.    Current Feeding: OG   Respiratory Status: high flow nasal cannula (4L)     Treatment/Intervention    Oral Motor/ Peripheral Examination  Structural observations: all structures observed  Oral musculature: decreased strength of movement and decreased coordination of movement  Reflexes: Root +, Suck +   Secretion management: impaired  Phonation: present: , WNL  Non-nutritive suck: Rooting: with min stimulation, Suck  Will proceed with refill  Appreciate Neurology's assistance  initiation: independent   Stress cues observed during oral motor:   - Motor: none noted   - Physiological: change in vital signs desaturations with positional changes, poor alertness, unable to maintain alert state  Oral motor treatment: Session completed in conjunction with OT.  Infant received in a quiet-alert state with poor secretion management at baseline.  Dry pacifier presented with infant demonstrating immediate root/grasp onto nipple.  NNS initiated with up to x3-6 sucking bursts elicited.  Secretions cleared.  RN completed diaper change, BP, and temperature.  Following repositioning into side-lying, infant observed to transition between quiet alert and drowsy states with increased work of breathing and pooling secretions appreciated.  SLP completed oral suctioning x2.  Infant with no active root/mouth opening in response to dry pacifier nor dipped taste of breastmilk.  Due to poor state regulation, session completed.    Therapy Techniques: Slow paced handling, therapeutic pauses, oral swabs and/or dipped tastes via pacifier.     Family Centered Support/Education: Caregiver(s) not present. Will meet and provide teaching strategies as available.       ASSESSMENT:   Infant presents with increased work of breathing and drowsy/sleep state at assessments, limiting ability to participate in therapeutic tastes, etc.  Close monitoring is recommended.    Impressions & Recommendations:  Aspiration Risk Moderate  Factors include: co-morbitides, frequent stress cues, prematurity, difficulty managing oral secretions   Pacing n/a   Nipple n/a    Positioning Promote parent holding    Safe feeding strategies found effective Oral Swabs/pacifier dips as tolerated     Speech therapy will target safety of swallow function impacting patient's ability to meet daily nutrition/hydration needs via oral intake.    Pain after session:  N-PASS ( Pain, Agitation & Sedation Scale) Pain Assessment  Crying/Irritability 0 - No pain  signs   Behavior State 0 - Appropriate for gestational age   Facial Expression  0 - No pain signs   Extremity Tone 0 - No pain signs   Vital Signs 0 - No pain signs   Premature Pain Score 0     PLAN:   Suggestions for next session as indicated: Speech Therapy to continue to follow at least 1-2x/week for ongoing reassessment of pre-feeding skills, parent/caregiver education, and preparation of discharge needs.     Goals:  Goals  Discharge Goal #1: Infant to safely accept the least restrictive diet orally without clinical s/s of aspiration.  Discharge Goal #2 : Parent/caregiver to participate in education/training.  Short Term Goal #1: Infant to tolerate pacifier dips/oral swabs without distress.  Short Term Goal #2: Infant to participate in a breast/bottle tria when clinically indicated.  Short Term Goal #3: Parent/caregiver to participate in education/training at least x1 prior to discharge.    Frequency: Frequency: 1x/week with increase to 2x/week given limited progression.     Documentation completed on following flowsheet: Infant flowsheet     Little Kauffman MA, SLP/L  Pediatric Speech Language Pathologist

## 2022-10-12 NOTE — TELEPHONE ENCOUNTER
Chandu Faustin,    So in as much as Keppra is not nephrotoxic, and if she has felt well on this with no breakthroughs I would not adjust the dose just based on the GFR  If she is noticing significant side effects we can consider it but if we decrease the dose we can expose her to increased seizure risk  If she has no clinical side effects the dose should be maintained  If she does need neuro follow up we can always help facilitate, but if she has been clinically well and stable for the last few years and prefers not to add neurology back into the mix I am ok with this as long as you guys are comfortable  Just let me know how we can help        Thanks!    -Bethany Carolina

## 2022-11-07 ENCOUNTER — TELEPHONE (OUTPATIENT)
Dept: FAMILY MEDICINE CLINIC | Facility: CLINIC | Age: 72
End: 2022-11-07

## 2022-11-07 NOTE — TELEPHONE ENCOUNTER
Folder (To be completed by- Dr Jose Maria Russo     Name of Form - Screening checklist for vaccines (copy of Moderna Bivalent Booster shot card not scanned in yet due to signature request)    Color folder Alejandra Habermann    Form to be [employer] (Fax #), [phone]

## 2022-12-05 DIAGNOSIS — E78.49 OTHER HYPERLIPIDEMIA: ICD-10-CM

## 2022-12-05 DIAGNOSIS — I63.019 CEREBROVASCULAR ACCIDENT (CVA) DUE TO THROMBOSIS OF VERTEBRAL ARTERY, UNSPECIFIED BLOOD VESSEL LATERALITY (HCC): ICD-10-CM

## 2022-12-05 DIAGNOSIS — G31.84 MILD COGNITIVE IMPAIRMENT: ICD-10-CM

## 2022-12-05 DIAGNOSIS — E55.9 VITAMIN D DEFICIENCY: ICD-10-CM

## 2022-12-06 ENCOUNTER — OFFICE VISIT (OUTPATIENT)
Dept: FAMILY MEDICINE CLINIC | Facility: CLINIC | Age: 72
End: 2022-12-06

## 2022-12-06 VITALS
HEART RATE: 76 BPM | SYSTOLIC BLOOD PRESSURE: 124 MMHG | HEIGHT: 67 IN | DIASTOLIC BLOOD PRESSURE: 69 MMHG | OXYGEN SATURATION: 96 % | WEIGHT: 202.6 LBS | BODY MASS INDEX: 31.8 KG/M2 | TEMPERATURE: 97.2 F | RESPIRATION RATE: 16 BRPM

## 2022-12-06 DIAGNOSIS — Z00.00 MEDICARE ANNUAL WELLNESS VISIT, SUBSEQUENT: Primary | ICD-10-CM

## 2022-12-06 NOTE — PROGRESS NOTES
Assessment and Plan:     Problem List Items Addressed This Visit        Other    Medicare annual wellness visit, subsequent - Primary     - Patient presents for Medicare Annual Wellness  No acute complains or concerns  - Screening for cardiovascular disease: yearly CMP and Lipid panel ordered today   - Patient declines mammogram screening for breast CA  Last 3 results were negative  - No indication for Screening for colorectal and cervical cancer screening due to age  - Counseled the patient on healthy lifestyle habits         Relevant Orders    Comprehensive metabolic panel    Lipid panel        Preventive health issues were discussed with patient, and age appropriate screening tests were ordered as noted in patient's After Visit Summary  Personalized health advice and appropriate referrals for health education or preventive services given if needed, as noted in patient's After Visit Summary  History of Present Illness:     Patient presents for a Medicare Wellness Visit    Patient is a 75-year-old female presents to the clinic today with her brother and sister in law  Patient resides at a [facility]  She overall is going well  She reports joint pain around her wrist and fingers, and occasionally knee pain  She ambulates with a walker  Denies chest pain, SOB, abdominal pain, dysuria, hematuria, constipation  Patient Care Team:  Noe Galeano DO as PCP - General (Family Medicine)  Tiffanie Ryan MD as Endoscopist     Review of Systems:     Review of Systems   Constitutional: Negative for activity change, appetite change, chills and fever  Respiratory: Negative for cough and shortness of breath  Cardiovascular: Negative for chest pain and palpitations  Gastrointestinal: Negative for abdominal pain and vomiting  Genitourinary: Negative for dysuria and hematuria  Musculoskeletal: Positive for arthralgias and gait problem  Negative for back pain  Skin: Negative for color change and rash  Neurological: Negative for dizziness and headaches  All other systems reviewed and are negative  Problem List:     Patient Active Problem List   Diagnosis   • FH: colon cancer   • Seizure ([facility] [address] )   • HLD (hyperlipidemia)   • Essential hypertension   • Mild cognitive impairment   • Tremor, essential   • Vitamin D deficiency   • Low serum vitamin B12   • History of stroke   • Type 2 diabetes mellitus without complication, without long-term current use of insulin ([facility] )   • Encounter for gynecological examination   • Family history of ovarian cancer   • Hypokalemia   • Cough   • Numbness and tingling in left hand   • Acute pain of left shoulder   • Weight loss   • Cataract   • Osteoarthritis   • Fall   • Closed head injury   • Status post placement of implantable loop recorder   • Medicare annual wellness visit, subsequent      Past Medical and Surgical History:     Past Medical History:   Diagnosis Date   • Diverticulosis    • Hypertension    • Morbid obesity ([facility] [address] )    • Osteoarthritis 2/7/2022   • Pancreatic cyst    • Seizures ([facility] )     last seizure 2013   • Stroke [facility])    • Type 2 diabetes mellitus without complication, without long-term current use of insulin ([facility] ) 12/10/2018   • Wears glasses      Past Surgical History:   Procedure Laterality Date   • APPENDECTOMY     • COLONOSCOPY      complete   • EYE SURGERY     • NC COLONOSCOPY FLX DX W/COLLJ SPEC WHEN PFRMD N/A 03/06/2018    Procedure: COLONOSCOPY with polypectomy;  Surgeon: Smitha Rogers MD;  Location: [facility];   Service: General      Family History:     Family History   Problem Relation Age of Onset   • Stroke Father    • Colon cancer Father    • Lung cancer Maternal Grandfather    • Ovarian cancer Mother         Left ovary    • Other Other         MSH6-related Powers syndrome    • Stroke Family    • Ovarian cancer Family    • Prostate cancer Paternal Grandmother    • Ovarian cancer Family    • Breast cancer Family 43   • Lung cancer Brother       Social History:     Social History     Socioeconomic History   • Marital status: Single     Spouse name: None   • Number of children: None   • Years of education: None   • Highest education level: None   Occupational History   • None   Tobacco Use   • Smoking status: Never   • Smokeless tobacco: Never   Vaping Use   • Vaping Use: Never used   Substance and Sexual Activity   • Alcohol use: No   • Drug use: No   • Sexual activity: Never   Other Topics Concern   • None   Social History Narrative    Living in assisted living facility      Social Determinants of Health     Financial Resource Strain: Low Risk    • Difficulty of Paying Living Expenses: Not hard at all   Food Insecurity: No Food Insecurity   • Worried About Running Out of Food in the Last Year: Never true   • Ran Out of Food in the Last Year: Never true   Transportation Needs: No Transportation Needs   • Lack of Transportation (Medical): No   • Lack of Transportation (Non-Medical):  No   Physical Activity: Not on file   Stress: No Stress Concern Present   • Feeling of Stress : Not at all   Social Connections: Not on file   Intimate Partner Violence: Not on file   Housing Stability: Low Risk    • Unable to Pay for Housing in the Last Year: No   • Number of Places Lived in the Last Year: 1   • Unstable Housing in the Last Year: No      Medications and Allergies:     Current Outpatient Medications   Medication Sig Dispense Refill   • aspirin 81 mg chewable tablet Chew 1 tablet (81 mg total) daily 30 tablet 5   • atorvastatin (LIPITOR) 80 mg tablet Take 1 tablet (80 mg total) by mouth daily 30 tablet 5   • Blood Pressure Monitor MISC by Does not apply route daily 1 each 0   • cholecalciferol (VITAMIN D3) 1,000 units tablet Take 1 tablet (1,000 Units total) by mouth daily 90 tablet 1   • clopidogrel (PLAVIX) 75 mg tablet Take 1 tablet (75 mg total) by mouth daily 30 tablet 5   • Diclofenac Sodium (VOLTAREN) 1 % Apply 2 g topically 2 (two) times a day 100 g 3   • donepezil (ARICEPT) 10 mg tablet Take 1 tablet (10 mg total) by mouth daily 30 tablet 5   • Elastic Bandages & Supports (WRIST SPLINT/COCK-UP/LEFT L) MISC by Does not apply route daily 1 each 0   • levETIRAcetam (KEPPRA) 750 mg tablet TAKE 1 TABLET (750 MG TOTAL) BY MOUTH IN THE MORNING AND 1 TABLET (750 MG TOTAL) IN THE EVENING  60 tablet 10   • lisinopril (ZESTRIL) 10 mg tablet Take 1 tablet (10 mg total) by mouth daily 30 tablet 5   • vitamin B-12 (VITAMIN B-12) 1,000 mcg tablet Take 1 tablet (1,000 mcg total) by mouth daily 30 tablet 5   • Blood Pressure Monitoring (BLOOD PRESSURE CUFF) MISC by Does not apply route daily 1 each 0   • COVID-19 At Home Test (FLOWFLEX COVID-19 AG HOME TEST VI)  (Patient not taking: Reported on 12/6/2022)     • cyclobenzaprine (FLEXERIL) 5 mg tablet Take 1 tablet (5 mg total) by mouth daily at bedtime (Patient not taking: Reported on 6/1/2022) 30 tablet 5   • potassium chloride (K-DUR,KLOR-CON) 20 mEq tablet Take 1 tablet (20 mEq total) by mouth daily for 3 days 3 tablet 0     No current facility-administered medications for this visit       No Known Allergies   Immunizations:     Immunization History   Administered Date(s) Administered   • COVID-19 MODERNA VACC 0 5 ML IM 01/12/2021, 02/09/2021, 10/18/2022   • INFLUENZA 09/28/2017, 09/07/2021   • Influenza Quadrivalent Preservative Free 3 years and older IM 11/09/2015   • Influenza, high dose seasonal 0 7 mL 10/04/2018, 09/07/2021   • Influenza, seasonal, injectable 12/02/2014   • Pneumococcal Conjugate 13-Valent 12/11/2015   • Pneumococcal Polysaccharide PPV23 03/25/2019   • influenza, trivalent, adjuvanted 11/05/2019      Health Maintenance:         Topic Date Due   • Breast Cancer Screening: Mammogram  09/12/2020   • Colorectal Cancer Screening  03/06/2028   • Hepatitis C Screening  Completed         Topic Date Due   • Hepatitis B Vaccine (1 of 3 - 3-dose series) Never done   • Influenza Vaccine (1) 09/01/2022      Medicare Screening Tests and Risk Assessments:     Cordelia Mars is here for her Subsequent Wellness visit  Health Risk Assessment:   Patient rates overall health as very good  Patient feels that their physical health rating is same  Patient is dissatisfied with their life  Eyesight was rated as same  Hearing was rated as same  Patient feels that their emotional and mental health rating is same  Patients states they are never, rarely angry  Patient states they are sometimes unusually tired/fatigued  Pain experienced in the last 7 days has been some  Patient states that she has experienced no weight loss or gain in last 6 months  Fall Risk Screening: In the past year, patient has experienced: no history of falling in past year      Urinary Incontinence Screening:   Patient has not leaked urine accidently in the last six months  Home Safety:  Patient has trouble with stairs inside or outside of their home  Patient has working smoke alarms and has working carbon monoxide detector  Home safety hazards include: none  Nutrition:   Current diet is Regular  Medications:   Patient is currently taking over-the-counter supplements  OTC medications include: see medication list  Patient is able to manage medications  Activities of Daily Living (ADLs)/Instrumental Activities of Daily Living (IADLs):   Walk and transfer into and out of bed and chair?: Yes  Dress and groom yourself?: Yes    Bathe or shower yourself?: No    Feed yourself? Yes  Do your laundry/housekeeping?: No  Manage your money, pay your bills and track your expenses?: No  Make your own meals?: No    Do your own shopping?: No    Previous Hospitalizations:   Any hospitalizations or ED visits within the last 12 months?: No      Advance Care Planning:   Living will: No    Durable POA for healthcare: Yes    Advanced directive: No      Comments: Patient's daughter is POA for patient Cruz Medley)      Cognitive Screening:   Provider or family/friend/caregiver concerned regarding cognition?: No    PREVENTIVE SCREENINGS      Cardiovascular Screening:    General: Screening Not Indicated and History Lipid Disorder      Diabetes Screening:     General: Screening Not Indicated and History Diabetes      Colorectal Cancer Screening:     General: Screening Current      Cervical Cancer Screening:    General: Screening Not Indicated      Lung Cancer Screening:     General: Screening Not Indicated      Hepatitis C Screening:    General: Screening Current    Screening, Brief Intervention, and Referral to Treatment (SBIRT)    Screening  Typical number of drinks in a day: 0  Typical number of drinks in a week: 0  Interpretation: Low risk drinking behavior  Single Item Drug Screening:  How often have you used an illegal drug (including marijuana) or a prescription medication for non-medical reasons in the past year? never    Single Item Drug Screen Score: 0  Interpretation: Negative screen for possible drug use disorder    No results found  Physical Exam:     /69 (BP Location: Left arm, Patient Position: Sitting, Cuff Size: Adult)   Pulse 76   Temp (!) 97 2 °F (36 2 °C) (Temporal)   Resp 16   Ht 5' 7" (1 702 m)   Wt 91 9 kg (202 lb 9 6 oz)   SpO2 96%   BMI 31 73 kg/m²     Physical Exam  Vitals reviewed  Constitutional:       General: She is not in acute distress  Appearance: Normal appearance  She is well-developed  She is not ill-appearing, toxic-appearing or diaphoretic  HENT:      Head: Normocephalic and atraumatic  Eyes:      Conjunctiva/sclera: Conjunctivae normal    Cardiovascular:      Rate and Rhythm: Normal rate and regular rhythm  Heart sounds: No murmur heard  Pulmonary:      Effort: Pulmonary effort is normal  No respiratory distress  Breath sounds: Normal breath sounds  Abdominal:      Palpations: Abdomen is soft  Tenderness: There is no abdominal tenderness     Musculoskeletal: General: No swelling  Cervical back: Neck supple  Comments: + resting tremors noted    Skin:     General: Skin is warm and dry  Capillary Refill: Capillary refill takes less than 2 seconds  Neurological:      Mental Status: She is alert  Gait: Gait abnormal (ambulates with a walker)     Psychiatric:         Mood and Affect: Mood normal          Behavior: Behavior normal           Natalya Naranjo MD

## 2022-12-06 NOTE — ASSESSMENT & PLAN NOTE
- Patient presents for [facility] Annual Wellness  No acute complains or concerns  - Screening for cardiovascular disease: yearly CMP and Lipid panel ordered today   - Patient declines mammogram screening for breast CA  Last 3 results were negative  - No indication for Screening for colorectal and cervical cancer screening due to age      - Counseled the patient on healthy lifestyle habits

## 2022-12-07 DIAGNOSIS — M19.90 OSTEOARTHRITIS, UNSPECIFIED OSTEOARTHRITIS TYPE, UNSPECIFIED SITE: ICD-10-CM

## 2022-12-09 RX ORDER — ASPIRIN 81 MG/1
81 TABLET, CHEWABLE ORAL DAILY
Qty: 30 TABLET | Refills: 5 | Status: SHIPPED | OUTPATIENT
Start: 2022-12-09

## 2022-12-09 RX ORDER — MELATONIN
1000 DAILY
Qty: 90 TABLET | Refills: 1 | Status: SHIPPED | OUTPATIENT
Start: 2022-12-09

## 2022-12-09 RX ORDER — ATORVASTATIN CALCIUM 80 MG/1
80 TABLET, FILM COATED ORAL DAILY
Qty: 30 TABLET | Refills: 5 | Status: SHIPPED | OUTPATIENT
Start: 2022-12-09

## 2022-12-09 RX ORDER — DONEPEZIL HYDROCHLORIDE 10 MG/1
10 TABLET, FILM COATED ORAL DAILY
Qty: 30 TABLET | Refills: 5 | Status: SHIPPED | OUTPATIENT
Start: 2022-12-09

## 2022-12-13 ENCOUNTER — TELEPHONE (OUTPATIENT)
Dept: FAMILY MEDICINE CLINIC | Facility: CLINIC | Age: 72
End: 2022-12-13

## 2022-12-13 NOTE — TELEPHONE ENCOUNTER
Call from [facility] reporting patient is showing increase in confusion   She is asking for Dr's order for UA  Please fax to [phone]

## 2022-12-13 NOTE — TELEPHONE ENCOUNTER
Good Morning  I called Ashely with no answer at [phone]  Tried to gather more information in the patient symptoms  Please review and if appropriate place the order for UA in order for us to send order via fax  Let us know   Thanks

## 2022-12-14 ENCOUNTER — OFFICE VISIT (OUTPATIENT)
Dept: FAMILY MEDICINE CLINIC | Facility: CLINIC | Age: 72
End: 2022-12-14

## 2022-12-14 VITALS
HEIGHT: 67 IN | SYSTOLIC BLOOD PRESSURE: 132 MMHG | DIASTOLIC BLOOD PRESSURE: 85 MMHG | BODY MASS INDEX: 31.55 KG/M2 | HEART RATE: 78 BPM | WEIGHT: 201 LBS | OXYGEN SATURATION: 98 % | RESPIRATION RATE: 16 BRPM

## 2022-12-14 DIAGNOSIS — Z91.89: Primary | ICD-10-CM

## 2022-12-14 NOTE — PATIENT INSTRUCTIONS
If symptoms recur, please come back for assessment with accurate facility documentation of noted/concerning symptoms  No changes in medication regimen at this time  No testing required at this time

## 2022-12-14 NOTE — TELEPHONE ENCOUNTER
Good Morning dr Harpal Chu and spoke with Charlie  I offered an appointment how you recommend  Nurse told me they do not take patient to appoinment is the parents  They will contact the parents for them to contact us to schedule an appointment  I will let you know   Thanks
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
## 2022-12-15 NOTE — TELEPHONE ENCOUNTER
Folder (To be completed by) - Dr Nasim Gibbs     Name of Form - Physicians Order for [facility]    Form to be given to patient at the end of visit on 12/14/2022  Form has been completed by Dr Nasim Gibbs and placed in purple folder in clerical area to be scanned into patient's chart

## 2023-01-11 DIAGNOSIS — E78.49 OTHER HYPERLIPIDEMIA: ICD-10-CM

## 2023-01-11 DIAGNOSIS — I63.019 CEREBROVASCULAR ACCIDENT (CVA) DUE TO THROMBOSIS OF VERTEBRAL ARTERY, UNSPECIFIED BLOOD VESSEL LATERALITY (HCC): ICD-10-CM

## 2023-01-11 RX ORDER — ASPIRIN 81 MG/1
TABLET, CHEWABLE ORAL
Qty: 30 TABLET | Refills: 11 | Status: SHIPPED | OUTPATIENT
Start: 2023-01-11

## 2023-01-11 RX ORDER — ATORVASTATIN CALCIUM 80 MG/1
80 TABLET, FILM COATED ORAL DAILY
Qty: 30 TABLET | Refills: 10 | Status: SHIPPED | OUTPATIENT
Start: 2023-01-11

## 2023-01-19 ENCOUNTER — RA CDI HCC (OUTPATIENT)
Dept: OTHER | Facility: HOSPITAL | Age: 73
End: 2023-01-19

## 2023-01-19 NOTE — PROGRESS NOTES
[facility] [address]  coding opportunities       Chart reviewed, no opportunity found: CHART REVIEWED, NO OPPORTUNITY FOUND        Patients Insurance     Medicare Insurance: Medicare

## 2023-01-26 ENCOUNTER — OFFICE VISIT (OUTPATIENT)
Dept: FAMILY MEDICINE CLINIC | Facility: CLINIC | Age: 73
End: 2023-01-26

## 2023-01-26 VITALS
OXYGEN SATURATION: 98 % | SYSTOLIC BLOOD PRESSURE: 154 MMHG | TEMPERATURE: 97.2 F | WEIGHT: 201 LBS | HEIGHT: 67 IN | HEART RATE: 69 BPM | RESPIRATION RATE: 18 BRPM | BODY MASS INDEX: 31.55 KG/M2 | DIASTOLIC BLOOD PRESSURE: 79 MMHG

## 2023-01-26 DIAGNOSIS — R41.82 ALTERED MENTAL STATUS, UNSPECIFIED ALTERED MENTAL STATUS TYPE: Primary | ICD-10-CM
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
## 2023-02-01 DIAGNOSIS — E55.9 VITAMIN D DEFICIENCY: ICD-10-CM

## 2023-02-01 RX ORDER — MELATONIN
Qty: 30 TABLET | Refills: 10 | Status: SHIPPED | OUTPATIENT
Start: 2023-02-01

## 2023-02-04 PROBLEM — Z00.00 MEDICARE ANNUAL WELLNESS VISIT, SUBSEQUENT: Status: RESOLVED | Noted: 2022-12-06 | Resolved: 2023-02-04

## 2023-03-06 DIAGNOSIS — Z86.73 HISTORY OF STROKE: ICD-10-CM

## 2023-03-06 RX ORDER — LANOLIN ALCOHOL/MO/W.PET/CERES
CREAM (GRAM) TOPICAL
Qty: 30 TABLET | Refills: 10 | Status: SHIPPED | OUTPATIENT
Start: 2023-03-06

## 2023-03-08 ENCOUNTER — TELEPHONE (OUTPATIENT)
Dept: FAMILY MEDICINE CLINIC | Facility: CLINIC | Age: 73
End: 2023-03-08

## 2023-03-08 NOTE — TELEPHONE ENCOUNTER
Kristen Howard from [facility], requesting to D/C order of an Ace Rap to left hand for swelling  Caller states, patient no longer want the rap  Please fax order to [phone]

## 2023-06-07 ENCOUNTER — TELEPHONE (OUTPATIENT)
Dept: FAMILY MEDICINE CLINIC | Facility: CLINIC | Age: 73
End: 2023-06-07

## 2023-06-07 NOTE — TELEPHONE ENCOUNTER
[facility] is requesting  An order to have diclofenac sodium gel change to PRN

## 2023-09-07 ENCOUNTER — TELEPHONE (OUTPATIENT)
Dept: CARDIOLOGY CLINIC | Facility: CLINIC | Age: 73
End: 2023-09-07

## 2023-09-07 NOTE — TELEPHONE ENCOUNTER
my name is Leslie Daley. I'm calling regarding my Aunt Adilson Urias. My telephone number is [phone]. She's in an assisted living. I'm calling because I received a notification from [employer] that her device is no longer functioning and that I should call her doctor again. Please give me a call back as soon as possible. My phone number is [phone]. Thank you.
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
## 2023-09-13 DIAGNOSIS — R56.9 SEIZURE (HCC): ICD-10-CM

## 2023-09-13 RX ORDER — LEVETIRACETAM 750 MG/1
750 TABLET ORAL 2 TIMES DAILY
Qty: 60 TABLET | Refills: 10 | Status: SHIPPED | OUTPATIENT
Start: 2023-09-13

## 2023-10-09 NOTE — TELEPHONE ENCOUNTER
MD ATTESTATION NOTE    The JAVIER and I have discussed this patient's history, physical exam, and treatment plan.  I have reviewed the documentation and personally had a face to face interaction with the patient. I affirm the documentation and agree with the treatment and plan.  The attached note describes my personal findings.      I provided a substantive portion of the care of the patient.  I personally performed the physical exam in its entirety, and below are my findings.      Brief HPI: Patient complains of pain in her right hip and leg after falling yesterday.  She also complains of tingling in her right buttock and right thigh.  History is limited secondary to dementia.    PHYSICAL EXAM  ED Triage Vitals [10/09/23 1204]   Temp Heart Rate Resp BP SpO2   97.6 øF (36.4 øC) 86 18 146/78 98 %      Temp src Heart Rate Source Patient Position BP Location FiO2 (%)   Tympanic Monitor -- -- --         GENERAL: Awake and alert.  Oriented to self.  Well-developed, well-nourished elderly female.  Resting comfortably in no acute distress  HENT: nares patent  EYES: no scleral icterus  CV: regular rhythm, normal rate  RESPIRATORY: normal effort, clear to auscultation bilaterally  ABDOMEN: soft, nontender  MUSCULOSKELETAL: There is mild tenderness over the anterior right hip and right SI joint.  There is full range of motion in the right leg.  Remainder of the right lower extremity is nontender.  T and L-spine are nontender.  NEURO: alert, moves all extremities, follows commands  PSYCH:  calm, cooperative  SKIN: warm, dry    Vital signs and nursing notes reviewed.        Plan: Obtain imaging studies.    Imaging studies are negative acute.  Patient will be discharged.  Plan is for symptomatic treatment with Robaxin, Naprosyn, and lidocaine patches.    ED Course as of 10/09/23 1545   Mon Oct 09, 2023   1515 Right hip x-ray personally interpreted by me.  My personal interpretation is: No acute fracture or dislocation. [WH]      ED  Scanned to chart Course User Index  [WH] Demetris Quesada MD Holland, William D, MD  10/09/23 5163

## 2023-11-14 DIAGNOSIS — I10 ESSENTIAL HYPERTENSION: ICD-10-CM

## 2023-11-14 DIAGNOSIS — G31.84 MILD COGNITIVE IMPAIRMENT: ICD-10-CM

## 2023-11-14 RX ORDER — DONEPEZIL HYDROCHLORIDE 10 MG/1
TABLET, FILM COATED ORAL
Qty: 30 TABLET | Refills: 10 | Status: SHIPPED | OUTPATIENT
Start: 2023-11-14

## 2023-11-14 RX ORDER — LISINOPRIL 10 MG/1
TABLET ORAL
Qty: 30 TABLET | Refills: 10 | Status: SHIPPED | OUTPATIENT
Start: 2023-11-14

## 2023-11-14 NOTE — TELEPHONE ENCOUNTER
GFR and K within appropriate range in 1/2023. Will refill as a courtesy    However, will need an appointment for f/u HTN.     Opal Cousins, DO PGY-3  Family Medicine

## 2023-11-15 DIAGNOSIS — E55.9 VITAMIN D DEFICIENCY: ICD-10-CM

## 2023-11-15 RX ORDER — MELATONIN
1000 DAILY
Qty: 30 TABLET | Refills: 5 | Status: SHIPPED | OUTPATIENT
Start: 2023-11-15

## 2023-11-15 NOTE — TELEPHONE ENCOUNTER
Ca 9.4 in 1/2023 per chart review, so patient meets parameters. Will send refill.     Sukhjinder Houston, DO PGY-3  Family Medicine

## 2023-12-13 DIAGNOSIS — E78.49 OTHER HYPERLIPIDEMIA: ICD-10-CM

## 2023-12-14 RX ORDER — ATORVASTATIN CALCIUM 80 MG/1
80 TABLET, FILM COATED ORAL DAILY
Qty: 30 TABLET | Refills: 10 | Status: SHIPPED | OUTPATIENT
Start: 2023-12-14

## 2024-01-04 DIAGNOSIS — I63.9 CEREBROVASCULAR ACCIDENT (CVA), UNSPECIFIED MECHANISM (HCC): ICD-10-CM

## 2024-01-04 RX ORDER — CLOPIDOGREL BISULFATE 75 MG/1
75 TABLET ORAL DAILY
Qty: 30 TABLET | Refills: 4 | Status: SHIPPED | OUTPATIENT
Start: 2024-01-04

## 2024-01-13 DIAGNOSIS — I63.019 CEREBROVASCULAR ACCIDENT (CVA) DUE TO THROMBOSIS OF VERTEBRAL ARTERY, UNSPECIFIED BLOOD VESSEL LATERALITY (HCC): ICD-10-CM

## 2024-01-13 DIAGNOSIS — Z86.73 HISTORY OF STROKE: ICD-10-CM

## 2024-01-15 RX ORDER — LANOLIN ALCOHOL/MO/W.PET/CERES
CREAM (GRAM) TOPICAL
Qty: 30 TABLET | Refills: 10 | Status: SHIPPED | OUTPATIENT
Start: 2024-01-15

## 2024-01-15 RX ORDER — ASPIRIN 81 MG/1
TABLET, CHEWABLE ORAL
Qty: 30 TABLET | Refills: 11 | Status: SHIPPED | OUTPATIENT
Start: 2024-01-15

## 2024-01-15 NOTE — TELEPHONE ENCOUNTER
Will provide refill as a courtesy.     Pt is also due for MAW, may you please schedule this patient?    Thank you in advance,  Roxie Galeano, DO PGY-3  Family Medicine

## 2024-01-26 NOTE — INCIDENTAL FINDINGS
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
none

## 2024-01-30 ENCOUNTER — TELEPHONE (OUTPATIENT)
Dept: FAMILY MEDICINE CLINIC | Facility: CLINIC | Age: 74
End: 2024-01-30

## 2024-01-30 ENCOUNTER — OFFICE VISIT (OUTPATIENT)
Dept: FAMILY MEDICINE CLINIC | Facility: CLINIC | Age: 74
End: 2024-01-30

## 2024-01-30 VITALS
WEIGHT: 194.2 LBS | RESPIRATION RATE: 18 BRPM | HEIGHT: 66 IN | OXYGEN SATURATION: 98 % | BODY MASS INDEX: 31.21 KG/M2 | DIASTOLIC BLOOD PRESSURE: 73 MMHG | SYSTOLIC BLOOD PRESSURE: 124 MMHG | HEART RATE: 87 BPM | TEMPERATURE: 97.1 F

## 2024-01-30 DIAGNOSIS — Z00.00 MEDICARE ANNUAL WELLNESS VISIT, SUBSEQUENT: Primary | ICD-10-CM

## 2024-01-30 DIAGNOSIS — Z12.11 SCREENING FOR COLON CANCER: ICD-10-CM

## 2024-01-30 DIAGNOSIS — Z13.6 SCREENING FOR CARDIOVASCULAR CONDITION: ICD-10-CM

## 2024-01-30 DIAGNOSIS — Z12.31 ENCOUNTER FOR SCREENING MAMMOGRAM FOR MALIGNANT NEOPLASM OF BREAST: ICD-10-CM

## 2024-01-30 PROBLEM — E11.9 TYPE 2 DIABETES MELLITUS WITHOUT COMPLICATION, WITHOUT LONG-TERM CURRENT USE OF INSULIN (HCC): Status: RESOLVED | Noted: 2018-12-10 | Resolved: 2024-01-30

## 2024-01-30 LAB — SL AMB POCT HEMOGLOBIN AIC: 5.7 (ref ?–6.5)

## 2024-01-30 PROCEDURE — G0439 PPPS, SUBSEQ VISIT: HCPCS | Performed by: FAMILY MEDICINE

## 2024-01-30 PROCEDURE — 83036 HEMOGLOBIN GLYCOSYLATED A1C: CPT | Performed by: FAMILY MEDICINE

## 2024-01-30 NOTE — TELEPHONE ENCOUNTER
Form completed at appointment on 1/30/24. Original returned to patient. Copy made to scan into chart, please scan

## 2024-01-30 NOTE — PROGRESS NOTES
Assessment and Plan:     Problem List Items Addressed This Visit    None  Visit Diagnoses     Medicare annual wellness visit, subsequent    -  Primary             Preventive health issues were discussed with patient, and age appropriate screening tests were ordered as noted in patient's After Visit Summary.  Personalized health advice and appropriate referrals for health education or preventive services given if needed, as noted in patient's After Visit Summary.     History of Present Illness:     Patient presents for a Medicare Wellness Visit    HPI   Patient Care Team:  Roxie Galeano DO as PCP - General (Family Medicine)  Jaymie Kruger MD as Endoscopist     Review of Systems:     Review of Systems     Problem List:     Patient Active Problem List   Diagnosis   • FH: colon cancer   • Seizure (HCC)   • HLD (hyperlipidemia)   • Essential hypertension   • Mild cognitive impairment   • Tremor, essential   • Vitamin D deficiency   • Low serum vitamin B12   • History of stroke   • Type 2 diabetes mellitus without complication, without long-term current use of insulin (HCC)   • Encounter for gynecological examination   • Family history of ovarian cancer   • Hypokalemia   • Cough   • Numbness and tingling in left hand   • Acute pain of left shoulder   • Weight loss   • Cataract   • Osteoarthritis   • Fall   • Closed head injury   • Status post placement of implantable loop recorder   • Altered mental status      Past Medical and Surgical History:     Past Medical History:   Diagnosis Date   • Diverticulosis    • Hypertension    • Morbid obesity (HCC)    • Osteoarthritis 2/7/2022   • Pancreatic cyst    • Seizures (HCC)     last seizure 2013   • Stroke (HCC)    • Type 2 diabetes mellitus without complication, without long-term current use of insulin (HCC) 12/10/2018   • Wears glasses      Past Surgical History:   Procedure Laterality Date   • APPENDECTOMY     • COLONOSCOPY      complete   • EYE SURGERY      • LA COLONOSCOPY FLX DX W/COLLJ SPEC WHEN PFRMD N/A 03/06/2018    Procedure: COLONOSCOPY with polypectomy;  Surgeon: Jaymie Kruger MD;  Location: [facility];  Service: General      Family History:     Family History   Problem Relation Age of Onset   • Stroke Father    • Colon cancer Father    • Lung cancer Maternal Grandfather    • Ovarian cancer Mother         Left ovary    • Other Other         MSH6-related Powers syndrome    • Stroke Family    • Ovarian cancer Family    • Prostate cancer Paternal Grandmother    • Ovarian cancer Family    • Breast cancer Family 42   • Lung cancer Brother       Social History:     Social History     Socioeconomic History   • Marital status: Single     Spouse name: Not on file   • Number of children: Not on file   • Years of education: Not on file   • Highest education level: Not on file   Occupational History   • Not on file   Tobacco Use   • Smoking status: Never   • Smokeless tobacco: Never   Vaping Use   • Vaping status: Never Used   Substance and Sexual Activity   • Alcohol use: No   • Drug use: No   • Sexual activity: Never   Other Topics Concern   • Not on file   Social History Narrative    Living in assisted living facility      Social Determinants of Health     Financial Resource Strain: Low Risk  (6/15/2022)    Overall Financial Resource Strain (CARDIA)    • Difficulty of Paying Living Expenses: Not hard at all   Food Insecurity: No Food Insecurity (6/15/2022)    Hunger Vital Sign    • Worried About Running Out of Food in the Last Year: Never true    • Ran Out of Food in the Last Year: Never true   Transportation Needs: No Transportation Needs (6/1/2022)    PRAPARE - Transportation    • Lack of Transportation (Medical): No    • Lack of Transportation (Non-Medical): No   Physical Activity: Not on file   Stress: No Stress Concern Present (6/1/2022)    [employer] [place] [employer] - Occupational Stress Questionnaire    • Feeling of Stress : Not at all   Social  Connections: Not on file   Intimate Partner Violence: Not on file   Housing Stability: Low Risk  (6/15/2022)    Housing Stability Vital Sign    • Unable to Pay for Housing in the Last Year: No    • Number of Places Lived in the Last Year: 1    • Unstable Housing in the Last Year: No      Medications and Allergies:     Current Outpatient Medications   Medication Sig Dispense Refill   • aspirin 81 mg chewable tablet CHEW 1 TABLET BY MOUTH EVERY DAY 30 tablet 11   • atorvastatin (LIPITOR) 80 mg tablet TAKE 1 TABLET (80 MG TOTAL) BY MOUTH DAILY 30 tablet 10   • Blood Pressure Monitor MISC by Does not apply route daily 1 each 0   • Blood Pressure Monitoring (BLOOD PRESSURE CUFF) MISC by Does not apply route daily 1 each 0   • cholecalciferol (VITAMIN D3) 1,000 units tablet Take 1 tablet (1,000 Units total) by mouth daily 30 tablet 5   • clopidogrel (PLAVIX) 75 mg tablet TAKE ONE TABLET BY MOUTH EVERY DAY 30 tablet 4   • COVID-19 At Home Test (FLOWFLEX COVID-19 AG HOME TEST VI)  (Patient not taking: Reported on 12/6/2022)     • cyclobenzaprine (FLEXERIL) 5 mg tablet Take 1 tablet (5 mg total) by mouth daily at bedtime (Patient not taking: Reported on 6/1/2022) 30 tablet 5   • Diclofenac Sodium (VOLTAREN) 1 % APPLY TOPICALLY TO PAINFUL AREA(S) ON SKIN TWO TIMES PER  g 10   • donepezil (ARICEPT) 10 mg tablet TAKE ONE TABLET BY MOUTH DAILY 30 tablet 10   • Elastic Bandages & Supports (WRIST SPLINT/COCK-UP/LEFT L) MISC by Does not apply route daily 1 each 0   • levETIRAcetam (KEPPRA) 750 mg tablet TAKE 1 TABLET (750 MG TOTAL) BY MOUTH IN THE MORNING AND 1 TABLET (750 MG TOTAL) IN THE EVENING. 60 tablet 10   • lisinopril (ZESTRIL) 10 mg tablet TAKE ONE TABLET BY MOUTH DAILY 30 tablet 10   • potassium chloride (K-DUR,KLOR-CON) 20 mEq tablet Take 1 tablet (20 mEq total) by mouth daily for 3 days 3 tablet 0   • vitamin B-12 (VITAMIN B-12) 1,000 mcg tablet TAKE ONE TABLET BY MOUTH EVERY DAY 30 tablet 10     No current  facility-administered medications for this visit.     No Known Allergies   Immunizations:     Immunization History   Administered Date(s) Administered   • COVID-19 MODERNA VACC 0.5 ML IM 01/12/2021, 02/09/2021, 10/18/2022   • INFLUENZA 09/28/2017, 09/07/2021   • Influenza Quadrivalent Preservative Free 3 years and older IM 11/09/2015   • Influenza, high dose seasonal 0.7 mL 10/04/2018, 09/07/2021   • Influenza, seasonal, injectable 12/02/2014   • Pneumococcal Conjugate 13-Valent 12/11/2015   • Pneumococcal Polysaccharide PPV23 03/25/2019   • influenza, trivalent, adjuvanted 11/05/2019      Health Maintenance:         Topic Date Due   • Breast Cancer Screening: Mammogram  09/12/2020   • Colorectal Cancer Screening  03/06/2028   • Hepatitis C Screening  Completed         Topic Date Due   • Influenza Vaccine (1) 09/01/2023   • COVID-19 Vaccine (4 - 2023-24 season) 09/01/2023      Medicare Screening Tests and Risk Assessments:     Annual Wellness Visit  No results found.     Physical Exam:     There were no vitals taken for this visit.    Physical Exam     Roxie Galeano DO

## 2024-01-30 NOTE — TELEPHONE ENCOUNTER
Signature required.    Folder (to be completed by): Dr. Galeano    Name of Form: [facility] Orders [facility] DME      Color Folder: N/A    Form to be faxed to:  Pt to collect at appt

## 2024-01-30 NOTE — PATIENT INSTRUCTIONS
Medicare Preventive Visit Patient Instructions  Thank you for completing your Welcome to Medicare Visit or Medicare Annual Wellness Visit today. Your next wellness visit will be due in one year (1/30/2025).  The screening/preventive services that you may require over the next 5-10 years are detailed below. Some tests may not apply to you based off risk factors and/or age. Screening tests ordered at today's visit but not completed yet may show as past due. Also, please note that scanned in results may not display below.  Preventive Screenings:  Service Recommendations Previous Testing/Comments   Colorectal Cancer Screening  * Colonoscopy    * Fecal Occult Blood Test (FOBT)/Fecal Immunochemical Test (FIT)  * Fecal DNA/Cologuard Test  * Flexible Sigmoidoscopy Age: 45-75 years old   Colonoscopy: every 10 years (may be performed more frequently if at higher risk)  OR  FOBT/FIT: every 1 year  OR  Cologuard: every 3 years  OR  Sigmoidoscopy: every 5 years  Screening may be recommended earlier than age 45 if at higher risk for colorectal cancer. Also, an individualized decision between you and your healthcare provider will decide whether screening between the ages of 76-85 would be appropriate. Colonoscopy: 03/06/2018  FOBT/FIT: Not on file  Cologuard: Not on file  Sigmoidoscopy: Not on file          Breast Cancer Screening Age: 40+ years old  Frequency: every 1-2 years  Not required if history of left and right mastectomy Mammogram: 09/12/2019        Cervical Cancer Screening Between the ages of 21-29, pap smear recommended once every 3 years.   Between the ages of 30-65, can perform pap smear with HPV co-testing every 5 years.   Recommendations may differ for women with a history of total hysterectomy, cervical cancer, or abnormal pap smears in past. Pap Smear: 04/29/2019        Hepatitis C Screening Once for adults born between 1945 and 1965  More frequently in patients at high risk for Hepatitis C Hep C Antibody:  06/25/2019        Diabetes Screening 1-2 times per year if you're at risk for diabetes or have pre-diabetes Fasting glucose: No results in last 5 years (No results in last 5 years)  A1C: 6.0 (6/3/2022)      Cholesterol Screening Once every 5 years if you don't have a lipid disorder. May order more often based on risk factors. Lipid panel: Not on file          Other Preventive Screenings Covered by Medicare:  Abdominal Aortic Aneurysm (AAA) Screening: covered once if your at risk. You're considered to be at risk if you have a family history of AAA.  Lung Cancer Screening: covers low dose CT scan once per year if you meet all of the following conditions: (1) Age 55-77; (2) No signs or symptoms of lung cancer; (3) Current smoker or have quit smoking within the last 15 years; (4) You have a tobacco smoking history of at least 20 pack years (packs per day multiplied by number of years you smoked); (5) You get a written order from a healthcare provider.  Glaucoma Screening: covered annually if you're considered high risk: (1) You have diabetes OR (2) Family history of glaucoma OR (3)  aged 50 and older OR (4)  American aged 65 and older  Osteoporosis Screening: covered every 2 years if you meet one of the following conditions: (1) You're estrogen deficient and at risk for osteoporosis based off medical history and other findings; (2) Have a vertebral abnormality; (3) On glucocorticoid therapy for more than 3 months; (4) Have primary hyperparathyroidism; (5) On osteoporosis medications and need to assess response to drug therapy.   Last bone density test (DXA Scan): 04/20/2019.  HIV Screening: covered annually if you're between the age of 15-65. Also covered annually if you are younger than 15 and older than 65 with risk factors for HIV infection. For pregnant patients, it is covered up to 3 times per pregnancy.    Immunizations:  Immunization Recommendations   Influenza Vaccine Annual influenza  vaccination during flu season is recommended for all persons aged >= 6 months who do not have contraindications   Pneumococcal Vaccine   * Pneumococcal conjugate vaccine = PCV13 (Prevnar 13), PCV15 (Vaxneuvance), PCV20 (Prevnar 20)  * Pneumococcal polysaccharide vaccine = PPSV23 (Pneumovax) Adults 19-63 yo with certain risk factors or if 65+ yo  If never received any pneumonia vaccine: recommend Prevnar 20 (PCV20)  Give PCV20 if previously received 1 dose of PCV13 or PPSV23   Hepatitis B Vaccine 3 dose series if at intermediate or high risk (ex: diabetes, end stage renal disease, liver disease)   Respiratory syncytial virus (RSV) Vaccine - COVERED BY MEDICARE PART D  * RSVPreF3 (Arexvy) CDC recommends that adults 60 years of age and older may receive a single dose of RSV vaccine using shared clinical decision-making (SCDM)   Tetanus (Td) Vaccine - COST NOT COVERED BY MEDICARE PART B Following completion of primary series, a booster dose should be given every 10 years to maintain immunity against tetanus. Td may also be given as tetanus wound prophylaxis.   Tdap Vaccine - COST NOT COVERED BY MEDICARE PART B Recommended at least once for all adults. For pregnant patients, recommended with each pregnancy.   Shingles Vaccine (Shingrix) - COST NOT COVERED BY MEDICARE PART B  2 shot series recommended in those 19 years and older who have or will have weakened immune systems or those 50 years and older     Health Maintenance Due:      Topic Date Due   • Breast Cancer Screening: Mammogram  09/12/2020   • Colorectal Cancer Screening  03/06/2028   • Hepatitis C Screening  Completed     Immunizations Due:      Topic Date Due   • Influenza Vaccine (1) 09/01/2023   • COVID-19 Vaccine (4 - 2023-24 season) 09/01/2023     Advance Directives   What are advance directives?  Advance directives are legal documents that state your wishes and plans for medical care. These plans are made ahead of time in case you lose your ability to make  decisions for yourself. Advance directives can apply to any medical decision, such as the treatments you want, and if you want to donate organs.   What are the types of advance directives?  There are many types of advance directives, and each state has rules about how to use them. You may choose a combination of any of the following:  Living will:  This is a written record of the treatment you want. You can also choose which treatments you do not want, which to limit, and which to stop at a certain time. This includes surgery, medicine, IV fluid, and tube feedings.   Durable power of  for healthcare (DPAHC):  This is a written record that states who you want to make healthcare choices for you when you are unable to make them for yourself. This person, called a proxy, is usually a family member or a friend. You may choose more than 1 proxy.  Do not resuscitate (DNR) order:  A DNR order is used in case your heart stops beating or you stop breathing. It is a request not to have certain forms of treatment, such as CPR. A DNR order may be included in other types of advance directives.  Medical directive:  This covers the care that you want if you are in a coma, near death, or unable to make decisions for yourself. You can list the treatments you want for each condition. Treatment may include pain medicine, surgery, blood transfusions, dialysis, IV or tube feedings, and a ventilator (breathing machine).  Values history:  This document has questions about your views, beliefs, and how you feel and think about life. This information can help others choose the care that you would choose.  Why are advance directives important?  An advance directive helps you control your care. Although spoken wishes may be used, it is better to have your wishes written down. Spoken wishes can be misunderstood, or not followed. Treatments may be given even if you do not want them. An advance directive may make it easier for your family  to make difficult choices about your care.       © Copyright [employer] 2018 Information is for End User's use only and may not be sold, redistributed or otherwise used for commercial purposes. All illustrations and images included in CareNotes® are the copyrighted property of [employer] Inc[employer] or [employer]      Medicare Preventive Visit Patient Instructions  Thank you for completing your Welcome to Medicare Visit or Medicare Annual Wellness Visit today. Your next wellness visit will be due in one year (1/30/2025).  The screening/preventive services that you may require over the next 5-10 years are detailed below. Some tests may not apply to you based off risk factors and/or age. Screening tests ordered at today's visit but not completed yet may show as past due. Also, please note that scanned in results may not display below.  Preventive Screenings:  Service Recommendations Previous Testing/Comments   Colorectal Cancer Screening  * Colonoscopy    * Fecal Occult Blood Test (FOBT)/Fecal Immunochemical Test (FIT)  * Fecal DNA/Cologuard Test  * Flexible Sigmoidoscopy Age: 45-75 years old   Colonoscopy: every 10 years (may be performed more frequently if at higher risk)  OR  FOBT/FIT: every 1 year  OR  Cologuard: every 3 years  OR  Sigmoidoscopy: every 5 years  Screening may be recommended earlier than age 45 if at higher risk for colorectal cancer. Also, an individualized decision between you and your healthcare provider will decide whether screening between the ages of 76-85 would be appropriate. Colonoscopy: 03/06/2018  FOBT/FIT: Not on file  Cologuard: Not on file  Sigmoidoscopy: Not on file          Breast Cancer Screening Age: 40+ years old  Frequency: every 1-2 years  Not required if history of left and right mastectomy Mammogram: 09/12/2019        Cervical Cancer Screening Between the ages of 21-29, pap smear recommended once every 3 years.   Between the ages of 30-65, can perform pap smear with HPV  co-testing every 5 years.   Recommendations may differ for women with a history of total hysterectomy, cervical cancer, or abnormal pap smears in past. Pap Smear: 04/29/2019        Hepatitis C Screening Once for adults born between 1945 and 1965  More frequently in patients at high risk for Hepatitis C Hep C Antibody: 06/25/2019        Diabetes Screening 1-2 times per year if you're at risk for diabetes or have pre-diabetes Fasting glucose: No results in last 5 years (No results in last 5 years)  A1C: 6.0 (6/3/2022)      Cholesterol Screening Once every 5 years if you don't have a lipid disorder. May order more often based on risk factors. Lipid panel: Not on file          Other Preventive Screenings Covered by Medicare:  Abdominal Aortic Aneurysm (AAA) Screening: covered once if your at risk. You're considered to be at risk if you have a family history of AAA.  Lung Cancer Screening: covers low dose CT scan once per year if you meet all of the following conditions: (1) Age 55-77; (2) No signs or symptoms of lung cancer; (3) Current smoker or have quit smoking within the last 15 years; (4) You have a tobacco smoking history of at least 20 pack years (packs per day multiplied by number of years you smoked); (5) You get a written order from a healthcare provider.  Glaucoma Screening: covered annually if you're considered high risk: (1) You have diabetes OR (2) Family history of glaucoma OR (3)  aged 50 and older OR (4)  American aged 65 and older  Osteoporosis Screening: covered every 2 years if you meet one of the following conditions: (1) You're estrogen deficient and at risk for osteoporosis based off medical history and other findings; (2) Have a vertebral abnormality; (3) On glucocorticoid therapy for more than 3 months; (4) Have primary hyperparathyroidism; (5) On osteoporosis medications and need to assess response to drug therapy.   Last bone density test (DXA Scan): 04/20/2019.  HIV  Screening: covered annually if you're between the age of 15-65. Also covered annually if you are younger than 15 and older than 65 with risk factors for HIV infection. For pregnant patients, it is covered up to 3 times per pregnancy.    Immunizations:  Immunization Recommendations   Influenza Vaccine Annual influenza vaccination during flu season is recommended for all persons aged >= 6 months who do not have contraindications   Pneumococcal Vaccine   * Pneumococcal conjugate vaccine = PCV13 (Prevnar 13), PCV15 (Vaxneuvance), PCV20 (Prevnar 20)  * Pneumococcal polysaccharide vaccine = PPSV23 (Pneumovax) Adults 19-65 yo with certain risk factors or if 65+ yo  If never received any pneumonia vaccine: recommend Prevnar 20 (PCV20)  Give PCV20 if previously received 1 dose of PCV13 or PPSV23   Hepatitis B Vaccine 3 dose series if at intermediate or high risk (ex: diabetes, end stage renal disease, liver disease)   Respiratory syncytial virus (RSV) Vaccine - COVERED BY MEDICARE PART D  * RSVPreF3 (Arexvy) CDC recommends that adults 60 years of age and older may receive a single dose of RSV vaccine using shared clinical decision-making (SCDM)   Tetanus (Td) Vaccine - COST NOT COVERED BY MEDICARE PART B Following completion of primary series, a booster dose should be given every 10 years to maintain immunity against tetanus. Td may also be given as tetanus wound prophylaxis.   Tdap Vaccine - COST NOT COVERED BY MEDICARE PART B Recommended at least once for all adults. For pregnant patients, recommended with each pregnancy.   Shingles Vaccine (Shingrix) - COST NOT COVERED BY MEDICARE PART B  2 shot series recommended in those 19 years and older who have or will have weakened immune systems or those 50 years and older     Health Maintenance Due:      Topic Date Due   • Breast Cancer Screening: Mammogram  09/12/2020   • Colorectal Cancer Screening  03/06/2028   • Hepatitis C Screening  Completed     Immunizations Due:       Topic Date Due   • Influenza Vaccine (1) 09/01/2023   • COVID-19 Vaccine (4 - 2023-24 season) 09/01/2023     Advance Directives   What are advance directives?  Advance directives are legal documents that state your wishes and plans for medical care. These plans are made ahead of time in case you lose your ability to make decisions for yourself. Advance directives can apply to any medical decision, such as the treatments you want, and if you want to donate organs.   What are the types of advance directives?  There are many types of advance directives, and each state has rules about how to use them. You may choose a combination of any of the following:  Living will:  This is a written record of the treatment you want. You can also choose which treatments you do not want, which to limit, and which to stop at a certain time. This includes surgery, medicine, IV fluid, and tube feedings.   Durable power of  for healthcare (DPAHC):  This is a written record that states who you want to make healthcare choices for you when you are unable to make them for yourself. This person, called a proxy, is usually a family member or a friend. You may choose more than 1 proxy.  Do not resuscitate (DNR) order:  A DNR order is used in case your heart stops beating or you stop breathing. It is a request not to have certain forms of treatment, such as CPR. A DNR order may be included in other types of advance directives.  Medical directive:  This covers the care that you want if you are in a coma, near death, or unable to make decisions for yourself. You can list the treatments you want for each condition. Treatment may include pain medicine, surgery, blood transfusions, dialysis, IV or tube feedings, and a ventilator (breathing machine).  Values history:  This document has questions about your views, beliefs, and how you feel and think about life. This information can help others choose the care that you would choose.  Why are  advance directives important?  An advance directive helps you control your care. Although spoken wishes may be used, it is better to have your wishes written down. Spoken wishes can be misunderstood, or not followed. Treatments may be given even if you do not want them. An advance directive may make it easier for your family to make difficult choices about your care.       © Copyright [employer] 2018 Information is for End User's use only and may not be sold, redistributed or otherwise used for commercial purposes. All illustrations and images included in CareNotes® are the copyrighted property of [employer]M., Inc. or [employer]

## 2024-02-01 ENCOUNTER — TELEPHONE (OUTPATIENT)
Dept: FAMILY MEDICINE CLINIC | Facility: CLINIC | Age: 74
End: 2024-02-01

## 2024-02-01 DIAGNOSIS — Z80.0 FH: COLON CANCER: ICD-10-CM

## 2024-02-01 DIAGNOSIS — Z12.11 SCREENING FOR COLON CANCER: Primary | ICD-10-CM

## 2024-02-01 NOTE — TELEPHONE ENCOUNTER
Called niece to discuss recent Cologuard screening for colon cancer, which was preferred by patient at her most recent [facility] visit.    Niece states that d/t pt's father's history of colon cancer and her personal history of Powers syndrome, she would like pt to be scheduled for a colonoscopy. Per chart review, pt previously followed with Dr Kruger (last visit 2/12/18). Daughter is agreeable to be referred.    Roxie Galeano, DO PGY-3  Family Medicine

## 2024-02-01 NOTE — TELEPHONE ENCOUNTER
Patient's niece (on residential) called regarding recent order for Cologuard test. She is concerned and would like a call back to discuss this order, especially regarding patient's medical history, in addition to family history of colon cancer.

## 2024-02-06 ENCOUNTER — TELEPHONE (OUTPATIENT)
Dept: FAMILY MEDICINE CLINIC | Facility: CLINIC | Age: 74
End: 2024-02-06

## 2024-02-06 NOTE — TELEPHONE ENCOUNTER
Contacted [employer] regarding Cologuard ordered.  Was able to confirm that Cologuard order was canceled.    Roxie Galeano, DO PGY-3  Family Medicine

## 2024-02-06 NOTE — TELEPHONE ENCOUNTER
Patients lizett Worthy called the office stating that she had spoken with the provider and opted to see the general surgeon for a GI follow up instead of the Cologuard and believed the order for the Cologuard was canceled, today they had received the box and were told that the insurance would still be billed unless the provider called [facility] and confirmed the order is canceled, She is going to reach out to [facility] office to make an apt for Nancy Fontanez (Lizett)  [phone]

## 2024-02-21 PROBLEM — Z13.6 SCREENING FOR CARDIOVASCULAR CONDITION: Status: RESOLVED | Noted: 2024-01-30 | Resolved: 2024-02-21

## 2024-02-21 PROBLEM — Z01.419 ENCOUNTER FOR GYNECOLOGICAL EXAMINATION: Status: RESOLVED | Noted: 2019-03-25 | Resolved: 2024-02-21

## 2024-02-21 PROBLEM — R05.9 COUGH: Status: RESOLVED | Noted: 2019-09-27 | Resolved: 2024-02-21

## 2024-02-21 PROBLEM — Z00.00 MEDICARE ANNUAL WELLNESS VISIT, SUBSEQUENT: Status: RESOLVED | Noted: 2022-12-06 | Resolved: 2024-02-21

## 2024-02-22 NOTE — PROGRESS NOTES
Estee Damon is a 73 y.o.female who is here for a:   Chief Complaint   Patient presents with    Colonoscopy        Colonoscopy in 2019 showed a 3 manage Carter ascending colon polyp and a 3 mm rectosigmoid polyp.  She is now here for follow-up.  These were both tubular adenomas.        Assessment/Plan:   Estee Damon is a 73 y.o.female who is here for a: Screening Colonoscopy.       Plan: Colonoscopy for: Personal history of polyps family history of colon cancer in father    $$$On Plavix.  Patient is aware to discontinue this 7 to 10 days prior to the procedure.      Previous Colonoscopy and  Location of polyps if any:   6 years ago and with polyps in the :   Ascending colon  and sigmoid colon..      Preoperative Clearance: Medical with anticuag recs      In preparation for this visit all relevant and known prior office notes, prior consultations, emergency room visits, blood work results, and imaging studies were personally reviewed.  A total of  35 minutes was spent reviewing all of this information., caring for this patient, providing differential diagnosis, instructions for management, counseling and coordination of care.  This also includes planning surgical intervention where indicated.    Preoperative Clearance: None      Patient was given full preop instructions including:  No fruits or vegetables or high roughage foods for 1 week prior to the procedure.  Clear liquids the day before the procedure, nothing by mouth after midnight the day before for the procedure.  Bowel prep instructions were given in writing to the patient and verbally reviewed.  Notify the office if patient is on any blood thinners.  Discontinue any weight loss medications 1 week prior to procedure unless instructed specifically otherwise.    Consult primary care physician for management of any diabetic medications.    Continue beta-blockers but no other hypertensive medications prior to the procedure.      The medication list was reviewed  in its entirety with the patient for preop instructions.    All preop medication should be taken per the anesthesia guidelines and as instructed by PAT in the pre-op visit or as otherwise instructed by the patient's PCP.    The patient is instructed to check with anesthesia preop and/or PCP regarding medications, especially any anticoagulation, hypertension medications, or diabetes medications.     Patient is instructed to stop any herbal medications or over-the-counter medications one week prior to the procedure.         Current Outpatient Medications:     aspirin 81 mg chewable tablet, CHEW 1 TABLET BY MOUTH EVERY DAY, Disp: 30 tablet, Rfl: 11    atorvastatin (LIPITOR) 80 mg tablet, TAKE 1 TABLET (80 MG TOTAL) BY MOUTH DAILY, Disp: 30 tablet, Rfl: 10    Blood Pressure Monitor MISC, by Does not apply route daily, Disp: 1 each, Rfl: 0    Blood Pressure Monitoring (BLOOD PRESSURE CUFF) MISC, by Does not apply route daily, Disp: 1 each, Rfl: 0    cholecalciferol (VITAMIN D3) 1,000 units tablet, Take 1 tablet (1,000 Units total) by mouth daily, Disp: 30 tablet, Rfl: 5    clopidogrel (PLAVIX) 75 mg tablet, TAKE ONE TABLET BY MOUTH EVERY DAY, Disp: 30 tablet, Rfl: 4    Diclofenac Sodium (VOLTAREN) 1 %, APPLY TOPICALLY TO PAINFUL AREA(S) ON SKIN TWO TIMES PER DAY, Disp: 100 g, Rfl: 10    donepezil (ARICEPT) 10 mg tablet, TAKE ONE TABLET BY MOUTH DAILY, Disp: 30 tablet, Rfl: 10    Elastic Bandages & Supports (WRIST SPLINT/COCK-UP/LEFT L) MISC, by Does not apply route daily, Disp: 1 each, Rfl: 0    levETIRAcetam (KEPPRA) 750 mg tablet, TAKE 1 TABLET (750 MG TOTAL) BY MOUTH IN THE MORNING AND 1 TABLET (750 MG TOTAL) IN THE EVENING., Disp: 60 tablet, Rfl: 10    lisinopril (ZESTRIL) 10 mg tablet, TAKE ONE TABLET BY MOUTH DAILY, Disp: 30 tablet, Rfl: 10    potassium chloride (K-DUR,KLOR-CON) 20 mEq tablet, Take 1 tablet (20 mEq total) by mouth daily for 3 days, Disp: 3 tablet, Rfl: 0    vitamin B-12 (VITAMIN B-12) 1,000 mcg  tablet, TAKE ONE TABLET BY MOUTH EVERY DAY, Disp: 30 tablet, Rfl: 10    COVID-19 At Home Test (FLOWFLEX COVID-19 AG HOME TEST VI), , Disp: , Rfl:     cyclobenzaprine (FLEXERIL) 5 mg tablet, Take 1 tablet (5 mg total) by mouth daily at bedtime (Patient not taking: Reported on 6/1/2022), Disp: 30 tablet, Rfl: 5       ______________________________________________________    HPI:  Estee Damon is a 73 y.o.female who was referred for evaluation of    Screening.      Currently:  Symptoms include:  no abdominal pain, change in bowel habits, or black or bloody stools.    History of stroke, history of using Plavix, closed head injury, family history of ovarian cancer.    Family history of colon cancer: None reported       Anticoagulation: Plavix    A1C:     LABS:      Lab Results   Component Value Date    WBC 8.26 11/17/2016    HGB 12.6 02/04/2022    HCT 37 02/04/2022    MCV 88 11/17/2016     11/17/2016     Lab Results   Component Value Date    K 3.5 12/04/2018     12/04/2018    CO2 34 (H) 02/04/2022    BUN 15 12/04/2018    CREATININE 1.00 12/04/2018    GLUCOSE 109 02/04/2022    CALCIUM 9.3 12/04/2018    AST 21 12/04/2018    ALT 32 12/04/2018    ALKPHOS 143 (H) 12/04/2018    EGFR 58 12/04/2018     Lab Results   Component Value Date    HGBA1C 5.7 01/30/2024     Lab Results   Component Value Date    INR 0.98 03/11/2015    PROTIME 13.2 03/11/2015         No orders to display           ROS:  General ROS: negative for - chills, fatigue, fever or night sweats, weight loss  Respiratory ROS: no cough, shortness of breath, or wheezing  Cardiovascular ROS: no chest pain or dyspnea on exertion  Genito-Urinary ROS: no dysuria, trouble voiding, or hematuria  Musculoskeletal ROS: negative for - gait disturbance, joint pain or muscle pain  Neurological ROS: no TIA or stroke symptoms  GI ROS: see HPI  Skin ROS: no new rashes or lesions   Lymphatic ROS: no new adenopathy noted by pt.   GYN ROS: see HPI, no new GYN history or  bleeding noted  Psy ROS: no new mental or behavioral disturbances           Imaging: No new pertinent imaging studies.         Patient Active Problem List   Diagnosis    FH: colon cancer    Seizure (HCC)    HLD (hyperlipidemia)    Essential hypertension    Mild cognitive impairment    Tremor, essential    Vitamin D deficiency    Low serum vitamin B12    History of stroke    Family history of ovarian cancer    Hypokalemia    Numbness and tingling in left hand    Acute pain of left shoulder    Weight loss    Cataract    Osteoarthritis    Fall    Closed head injury    Status post placement of implantable loop recorder    Altered mental status         Allergies:  Patient has no known allergies.      Current Outpatient Medications:     aspirin 81 mg chewable tablet, CHEW 1 TABLET BY MOUTH EVERY DAY, Disp: 30 tablet, Rfl: 11    atorvastatin (LIPITOR) 80 mg tablet, TAKE 1 TABLET (80 MG TOTAL) BY MOUTH DAILY, Disp: 30 tablet, Rfl: 10    Blood Pressure Monitor MISC, by Does not apply route daily, Disp: 1 each, Rfl: 0    Blood Pressure Monitoring (BLOOD PRESSURE CUFF) MISC, by Does not apply route daily, Disp: 1 each, Rfl: 0    cholecalciferol (VITAMIN D3) 1,000 units tablet, Take 1 tablet (1,000 Units total) by mouth daily, Disp: 30 tablet, Rfl: 5    clopidogrel (PLAVIX) 75 mg tablet, TAKE ONE TABLET BY MOUTH EVERY DAY, Disp: 30 tablet, Rfl: 4    Diclofenac Sodium (VOLTAREN) 1 %, APPLY TOPICALLY TO PAINFUL AREA(S) ON SKIN TWO TIMES PER DAY, Disp: 100 g, Rfl: 10    donepezil (ARICEPT) 10 mg tablet, TAKE ONE TABLET BY MOUTH DAILY, Disp: 30 tablet, Rfl: 10    Elastic Bandages & Supports (WRIST SPLINT/COCK-UP/LEFT L) MISC, by Does not apply route daily, Disp: 1 each, Rfl: 0    levETIRAcetam (KEPPRA) 750 mg tablet, TAKE 1 TABLET (750 MG TOTAL) BY MOUTH IN THE MORNING AND 1 TABLET (750 MG TOTAL) IN THE EVENING., Disp: 60 tablet, Rfl: 10    lisinopril (ZESTRIL) 10 mg tablet, TAKE ONE TABLET BY MOUTH DAILY, Disp: 30 tablet, Rfl:  10    potassium chloride (K-DUR,KLOR-CON) 20 mEq tablet, Take 1 tablet (20 mEq total) by mouth daily for 3 days, Disp: 3 tablet, Rfl: 0    vitamin B-12 (VITAMIN B-12) 1,000 mcg tablet, TAKE ONE TABLET BY MOUTH EVERY DAY, Disp: 30 tablet, Rfl: 10    COVID-19 At Home Test (FLOWFLEX COVID-19 AG HOME TEST VI), , Disp: , Rfl:     cyclobenzaprine (FLEXERIL) 5 mg tablet, Take 1 tablet (5 mg total) by mouth daily at bedtime (Patient not taking: Reported on 6/1/2022), Disp: 30 tablet, Rfl: 5    Past Medical History:   Diagnosis Date    Diverticulosis     Hypertension     Morbid obesity (HCC)     Osteoarthritis 2/7/2022    Pancreatic cyst     Seizures (HCC)     last seizure 2013    Stroke (HCC)     Type 2 diabetes mellitus without complication, without long-term current use of insulin (HCC) 12/10/2018    Wears glasses        Past Surgical History:   Procedure Laterality Date    APPENDECTOMY      COLONOSCOPY      complete    EYE SURGERY      [place] COLONOSCOPY FLX DX W/COLLJ SPEC WHEN PFRMD N/A 03/06/2018    Procedure: COLONOSCOPY with polypectomy;  Surgeon: Jaymie Kruger MD;  Location: [facility];  Service: General       Family History   Problem Relation Age of Onset    Stroke Father     Colon cancer Father     Lung cancer Maternal Grandfather     Ovarian cancer Mother         Left ovary     Other Other         MSH6-related Powers syndrome     Stroke Family     Ovarian cancer Family     Prostate cancer Paternal Grandmother     Ovarian cancer Family     Breast cancer Family 42    Lung cancer Brother        Social History     Socioeconomic History    Marital status: Single     Spouse name: None    Number of children: None    Years of education: None    Highest education level: None   Occupational History    None   Tobacco Use    Smoking status: Never    Smokeless tobacco: Never   Vaping Use    Vaping status: Never Used   Substance and Sexual Activity    Alcohol use: No    Drug use: No    Sexual activity: Never   Other Topics  Concern    None   Social History Narrative    Living in assisted living facility      Social Determinants of Health     Financial Resource Strain: Low Risk  (1/30/2024)    Overall Financial Resource Strain (CARDIA)     Difficulty of Paying Living Expenses: Not hard at all   Food Insecurity: No Food Insecurity (1/30/2024)    Hunger Vital Sign     Worried About Running Out of Food in the Last Year: Never true     Ran Out of Food in the Last Year: Never true   Transportation Needs: No Transportation Needs (1/30/2024)    PRAPARE - Transportation     Lack of Transportation (Medical): No     Lack of Transportation (Non-Medical): No   Physical Activity: Insufficiently Active (1/30/2024)    Exercise Vital Sign     Days of Exercise per Week: 5 days     Minutes of Exercise per Session: 20 min   Stress: No Stress Concern Present (1/30/2024)    [employer] [place] [employer] - Occupational Stress Questionnaire     Feeling of Stress : Not at all   Social Connections: Socially Isolated (1/30/2024)    Social Connection and Isolation Panel [NHANES]     Frequency of Communication with Friends and Family: Never     Frequency of Social Gatherings with Friends and Family: Never     Attends Muslim Services: 1 to 4 times per year     Active Member of Clubs or Organizations: No     Attends Club or Organization Meetings: Never     Marital Status: Never    Intimate Partner Violence: Not At Risk (1/30/2024)    Humiliation, Afraid, Rape, and Kick questionnaire     Fear of Current or Ex-Partner: No     Emotionally Abused: No     Physically Abused: No     Sexually Abused: No   Housing Stability: Unknown (1/30/2024)    Housing Stability Vital Sign     Unable to Pay for Housing in the Last Year: No     Number of Places Lived in the Last Year: Not on file     Unstable Housing in the Last Year: No       Exam:   Vitals:    02/26/24 1432   BP: 116/82   Pulse: 75   Temp: 99.1 °F (37.3 °C)   SpO2: 97%            ______________________________________________________    PHYSICAL EXAM  General Appearance:    Alert, cooperative, no distress,      Head:    Normocephalic without obvious abnormality   Eyes:    PERRL, conjunctiva/corneas clear,        Neck:   Supple, no adenopathy, no JVD   Back:     Symmetric, no spinal or CVA tenderness   Lungs:     Clear to auscultation bilaterally,    Heart:    Regular rate and rhythm, S1 and S2 normal, no murmur   Abdomen:     Non-tender in RUQ, LUQ, RLQ, LLQ, no magallon's signs. No visible masses or pulsations.    Extremities:   Extremities normal. No clubbing, cyanosis or edema   Psych:   Normal Affect   Neurologic:   CNII-XII intact. Strength symmetric, speech intact                 Chantal Perez PA-C  Date: 2/26/2024 Time: 2:55 PM       This report has been generated by a voice recognition software system.  Therefore, there may be syntax, spelling, and/or grammatical errors.  Please call if you've any questions.    This  encounter has been billed by a non certified Coder.          Informed consent for procedure was personally discussed, reviewed, and signed by Dr. Kruger. Discussion by Dr. Kruger was carried out regarding risks, benefits, and alternatives with the patient. Risks include but are not limited to:  bleeding, infection, and delayed wound healing or an open wound, pulmonary embolus, leaks from bowel or bile ducts or other viscus, transfusions, death.  Discussed in further detail the more common complications and their rates of occurrence.   was used if necessary.  Patient expressed understanding of the issues discussed and wished/consented to proceed.  All questions were answered by Dr. Kruger.    Discussion performed between patient and the provider signing below.     Signature:   Chantal Perez PA-C  Date: 2/26/2024 Time: 2:55 PM

## 2024-02-26 ENCOUNTER — CONSULT (OUTPATIENT)
Dept: SURGERY | Facility: CLINIC | Age: 74
End: 2024-02-26
Payer: MEDICARE

## 2024-02-26 VITALS
DIASTOLIC BLOOD PRESSURE: 82 MMHG | TEMPERATURE: 99.1 F | SYSTOLIC BLOOD PRESSURE: 116 MMHG | OXYGEN SATURATION: 97 % | HEART RATE: 75 BPM

## 2024-02-26 DIAGNOSIS — Z80.0 FH: COLON CANCER: ICD-10-CM

## 2024-02-26 DIAGNOSIS — Z12.11 SCREENING FOR COLON CANCER: ICD-10-CM

## 2024-02-26 DIAGNOSIS — Z80.41 FAMILY HISTORY OF OVARIAN CANCER: ICD-10-CM

## 2024-02-26 DIAGNOSIS — R56.9 SEIZURE (HCC): ICD-10-CM

## 2024-02-26 DIAGNOSIS — Z86.010 HISTORY OF COLON POLYPS: Primary | ICD-10-CM

## 2024-02-26 DIAGNOSIS — I10 ESSENTIAL HYPERTENSION: ICD-10-CM

## 2024-02-26 DIAGNOSIS — I63.9 CEREBROVASCULAR ACCIDENT (CVA), UNSPECIFIED MECHANISM (HCC): ICD-10-CM

## 2024-02-26 PROCEDURE — 99203 OFFICE O/P NEW LOW 30 MIN: CPT | Performed by: SURGERY

## 2024-02-27 ENCOUNTER — TELEPHONE (OUTPATIENT)
Dept: FAMILY MEDICINE CLINIC | Facility: CLINIC | Age: 74
End: 2024-02-27

## 2024-02-27 NOTE — TELEPHONE ENCOUNTER
Signature needed: Dr. Galeano    Form: [facility]    Fax # [phone]    Folder: [facility]

## 2024-04-05 ENCOUNTER — TELEPHONE (OUTPATIENT)
Age: 74
End: 2024-04-05

## 2024-04-05 NOTE — TELEPHONE ENCOUNTER
Pt niece called - they need a different date & time for follow up. Rescheduled for 4/15 at 2:30 w/ Chantal.

## 2024-04-08 NOTE — H&P (VIEW-ONLY)
Estee Damon is a 73 y.o.female who is here for a:   Chief Complaint   Patient presents with    Follow-up     H&P FOR COLONOSCOPY        Colonoscopy in 2019 showed a 3 manage Carter ascending colon polyp and a 3 mm rectosigmoid polyp.  She is now here for follow-up.  These were both tubular adenomas.        Assessment/Plan:   Estee Damon is a 73 y.o.female who is here for a: Screening Colonoscopy.       Plan: Colonoscopy for: Personal history of polyps family history of colon cancer in father    $$$On Plavix.  Patient is aware to discontinue this 5 days prior to procedure.      Previous Colonoscopy and  Location of polyps if any:   6 years ago and with polyps in the :   Ascending colon  and sigmoid colon..      Preoperative Clearance: Medical with anticavanig recs - this was received       In preparation for this visit all relevant and known prior office notes, prior consultations, emergency room visits, blood work results, and imaging studies were personally reviewed.  A total of  35 minutes was spent reviewing all of this information., caring for this patient, providing differential diagnosis, instructions for management, counseling and coordination of care.  This also includes planning surgical intervention where indicated.    Preoperative Clearance: None      Patient was given full preop instructions including:  No fruits or vegetables or high roughage foods for 1 week prior to the procedure.  Clear liquids the day before the procedure, nothing by mouth after midnight the day before for the procedure.  Bowel prep instructions were given in writing to the patient and verbally reviewed.  Notify the office if patient is on any blood thinners.  Discontinue any weight loss medications 1 week prior to procedure unless instructed specifically otherwise.    Consult primary care physician for management of any diabetic medications.    Continue beta-blockers but no other hypertensive medications prior to the  procedure.      The medication list was reviewed in its entirety with the patient for preop instructions.    All preop medication should be taken per the anesthesia guidelines and as instructed by PAT in the pre-op visit or as otherwise instructed by the patient's PCP.    The patient is instructed to check with anesthesia preop and/or PCP regarding medications, especially any anticoagulation, hypertension medications, or diabetes medications.     Patient is instructed to stop any herbal medications or over-the-counter medications one week prior to the procedure.         Current Outpatient Medications:     aspirin 81 mg chewable tablet, CHEW 1 TABLET BY MOUTH EVERY DAY, Disp: 30 tablet, Rfl: 11    atorvastatin (LIPITOR) 80 mg tablet, TAKE 1 TABLET (80 MG TOTAL) BY MOUTH DAILY, Disp: 30 tablet, Rfl: 10    Blood Pressure Monitor MISC, by Does not apply route daily, Disp: 1 each, Rfl: 0    Blood Pressure Monitoring (BLOOD PRESSURE CUFF) MISC, by Does not apply route daily, Disp: 1 each, Rfl: 0    cholecalciferol (VITAMIN D3) 1,000 units tablet, Take 1 tablet (1,000 Units total) by mouth daily, Disp: 30 tablet, Rfl: 5    clopidogrel (PLAVIX) 75 mg tablet, TAKE ONE TABLET BY MOUTH EVERY DAY, Disp: 30 tablet, Rfl: 4    Diclofenac Sodium (VOLTAREN) 1 %, APPLY TOPICALLY TO PAINFUL AREA(S) ON SKIN TWO TIMES PER DAY, Disp: 100 g, Rfl: 10    donepezil (ARICEPT) 10 mg tablet, TAKE ONE TABLET BY MOUTH DAILY, Disp: 30 tablet, Rfl: 10    Elastic Bandages & Supports (WRIST SPLINT/COCK-UP/LEFT L) MISC, by Does not apply route daily, Disp: 1 each, Rfl: 0    levETIRAcetam (KEPPRA) 750 mg tablet, TAKE 1 TABLET (750 MG TOTAL) BY MOUTH IN THE MORNING AND 1 TABLET (750 MG TOTAL) IN THE EVENING., Disp: 60 tablet, Rfl: 10    lisinopril (ZESTRIL) 10 mg tablet, TAKE ONE TABLET BY MOUTH DAILY, Disp: 30 tablet, Rfl: 10    vitamin B-12 (VITAMIN B-12) 1,000 mcg tablet, TAKE ONE TABLET BY MOUTH EVERY DAY, Disp: 30 tablet, Rfl: 10    COVID-19 At  Home Test (FLOWFLEX COVID-19 AG HOME TEST VI), , Disp: , Rfl:     cyclobenzaprine (FLEXERIL) 5 mg tablet, Take 1 tablet (5 mg total) by mouth daily at bedtime (Patient not taking: Reported on 6/1/2022), Disp: 30 tablet, Rfl: 5    potassium chloride (K-DUR,KLOR-CON) 20 mEq tablet, Take 1 tablet (20 mEq total) by mouth daily for 3 days, Disp: 3 tablet, Rfl: 0       ______________________________________________________    HPI:  Estee Damon is a 73 y.o.female who was referred for evaluation of    Screening.      Currently:  Symptoms include:  no abdominal pain, change in bowel habits, or black or bloody stools.    History of stroke, history of using Plavix, closed head injury, family history of ovarian cancer.    Family history of colon cancer: None reported       Anticoagulation: Plavix    A1C:     LABS:      Lab Results   Component Value Date    WBC 8.26 11/17/2016    HGB 12.6 02/04/2022    HCT 37 02/04/2022    MCV 88 11/17/2016     11/17/2016     Lab Results   Component Value Date    K 3.5 12/04/2018     12/04/2018    CO2 34 (H) 02/04/2022    BUN 15 12/04/2018    CREATININE 1.00 12/04/2018    GLUCOSE 109 02/04/2022    CALCIUM 9.3 12/04/2018    AST 21 12/04/2018    ALT 32 12/04/2018    ALKPHOS 143 (H) 12/04/2018    EGFR 58 12/04/2018     Lab Results   Component Value Date    HGBA1C 5.7 01/30/2024     Lab Results   Component Value Date    INR 0.98 03/11/2015    PROTIME 13.2 03/11/2015         No orders to display           ROS:  General ROS: negative for - chills, fatigue, fever or night sweats, weight loss  Respiratory ROS: no cough, shortness of breath, or wheezing  Cardiovascular ROS: no chest pain or dyspnea on exertion  Genito-Urinary ROS: no dysuria, trouble voiding, or hematuria  Musculoskeletal ROS: negative for - gait disturbance, joint pain or muscle pain  Neurological ROS: no TIA or stroke symptoms  GI ROS: see HPI  Skin ROS: no new rashes or lesions   Lymphatic ROS: no new adenopathy noted by  pt.   GYN ROS: see HPI, no new GYN history or bleeding noted  Psy ROS: no new mental or behavioral disturbances           Imaging: No new pertinent imaging studies.         Patient Active Problem List   Diagnosis    FH: colon cancer    Seizure (HCC)    HLD (hyperlipidemia)    Essential hypertension    Mild cognitive impairment    Tremor, essential    Vitamin D deficiency    Low serum vitamin B12    History of stroke    Family history of ovarian cancer    Hypokalemia    Numbness and tingling in left hand    Acute pain of left shoulder    Weight loss    Cataract    Osteoarthritis    Fall    Closed head injury    Status post placement of implantable loop recorder    Altered mental status         Allergies:  Patient has no known allergies.      Current Outpatient Medications:     aspirin 81 mg chewable tablet, CHEW 1 TABLET BY MOUTH EVERY DAY, Disp: 30 tablet, Rfl: 11    atorvastatin (LIPITOR) 80 mg tablet, TAKE 1 TABLET (80 MG TOTAL) BY MOUTH DAILY, Disp: 30 tablet, Rfl: 10    Blood Pressure Monitor MISC, by Does not apply route daily, Disp: 1 each, Rfl: 0    Blood Pressure Monitoring (BLOOD PRESSURE CUFF) MISC, by Does not apply route daily, Disp: 1 each, Rfl: 0    cholecalciferol (VITAMIN D3) 1,000 units tablet, Take 1 tablet (1,000 Units total) by mouth daily, Disp: 30 tablet, Rfl: 5    clopidogrel (PLAVIX) 75 mg tablet, TAKE ONE TABLET BY MOUTH EVERY DAY, Disp: 30 tablet, Rfl: 4    Diclofenac Sodium (VOLTAREN) 1 %, APPLY TOPICALLY TO PAINFUL AREA(S) ON SKIN TWO TIMES PER DAY, Disp: 100 g, Rfl: 10    donepezil (ARICEPT) 10 mg tablet, TAKE ONE TABLET BY MOUTH DAILY, Disp: 30 tablet, Rfl: 10    Elastic Bandages & Supports (WRIST SPLINT/COCK-UP/LEFT L) MISC, by Does not apply route daily, Disp: 1 each, Rfl: 0    levETIRAcetam (KEPPRA) 750 mg tablet, TAKE 1 TABLET (750 MG TOTAL) BY MOUTH IN THE MORNING AND 1 TABLET (750 MG TOTAL) IN THE EVENING., Disp: 60 tablet, Rfl: 10    lisinopril (ZESTRIL) 10 mg tablet, TAKE ONE  TABLET BY MOUTH DAILY, Disp: 30 tablet, Rfl: 10    vitamin B-12 (VITAMIN B-12) 1,000 mcg tablet, TAKE ONE TABLET BY MOUTH EVERY DAY, Disp: 30 tablet, Rfl: 10    COVID-19 At Home Test (FLOWFLEX COVID-19 AG HOME TEST VI), , Disp: , Rfl:     cyclobenzaprine (FLEXERIL) 5 mg tablet, Take 1 tablet (5 mg total) by mouth daily at bedtime (Patient not taking: Reported on 6/1/2022), Disp: 30 tablet, Rfl: 5    potassium chloride (K-DUR,KLOR-CON) 20 mEq tablet, Take 1 tablet (20 mEq total) by mouth daily for 3 days, Disp: 3 tablet, Rfl: 0    Past Medical History:   Diagnosis Date    Diverticulosis     Hypertension     Morbid obesity (HCC)     Osteoarthritis 2/7/2022    Pancreatic cyst     Seizures (HCC)     last seizure 2013    Stroke (HCC)     Type 2 diabetes mellitus without complication, without long-term current use of insulin (HCC) 12/10/2018    Wears glasses        Past Surgical History:   Procedure Laterality Date    APPENDECTOMY      COLONOSCOPY      complete    EYE SURGERY      TN COLONOSCOPY FLX DX W/COLLJ SPEC WHEN PFRMD N/A 03/06/2018    Procedure: COLONOSCOPY with polypectomy;  Surgeon: Jaymie Kruger MD;  Location: [facility];  Service: General       Family History   Problem Relation Age of Onset    Stroke Father     Colon cancer Father     Lung cancer Maternal Grandfather     Ovarian cancer Mother         Left ovary     Other Other         MSH6-related Powers syndrome     Stroke Family     Ovarian cancer Family     Prostate cancer Paternal Grandmother     Ovarian cancer Family     Breast cancer Family 42    Lung cancer Brother        Social History     Socioeconomic History    Marital status: Single     Spouse name: None    Number of children: None    Years of education: None    Highest education level: None   Occupational History    None   Tobacco Use    Smoking status: Never    Smokeless tobacco: Never   Vaping Use    Vaping status: Never Used   Substance and Sexual Activity    Alcohol use: No    Drug use: No     Sexual activity: Never   Other Topics Concern    None   Social History Narrative    Living in assisted living facility      Social Determinants of Health     Financial Resource Strain: Low Risk  (1/30/2024)    Overall Financial Resource Strain (CARDIA)     Difficulty of Paying Living Expenses: Not hard at all   Food Insecurity: No Food Insecurity (1/30/2024)    Hunger Vital Sign     Worried About Running Out of Food in the Last Year: Never true     Ran Out of Food in the Last Year: Never true   Transportation Needs: No Transportation Needs (1/30/2024)    PRAPARE - Transportation     Lack of Transportation (Medical): No     Lack of Transportation (Non-Medical): No   Physical Activity: Insufficiently Active (1/30/2024)    Exercise Vital Sign     Days of Exercise per Week: 5 days     Minutes of Exercise per Session: 20 min   Stress: No Stress Concern Present (1/30/2024)    [employer] - Occupational Stress Questionnaire     Feeling of Stress : Not at all   Social Connections: Socially Isolated (1/30/2024)    Social Connection and Isolation Panel [NHANES]     Frequency of Communication with Friends and Family: Never     Frequency of Social Gatherings with Friends and Family: Never     Attends Mu-ism Services: 1 to 4 times per year     Active Member of Clubs or Organizations: No     Attends Club or Organization Meetings: Never     Marital Status: Never    Intimate Partner Violence: Not At Risk (1/30/2024)    Humiliation, Afraid, Rape, and Kick questionnaire     Fear of Current or Ex-Partner: No     Emotionally Abused: No     Physically Abused: No     Sexually Abused: No   Housing Stability: Unknown (1/30/2024)    Housing Stability Vital Sign     Unable to Pay for Housing in the Last Year: No     Number of Places Lived in the Last Year: Not on file     Unstable Housing in the Last Year: No       Exam:   Vitals:    04/15/24 1427   BP: 122/77   Pulse: 77   SpO2: 96%              ______________________________________________________    PHYSICAL EXAM  General Appearance:    Alert, cooperative, no distress,      Head:    Normocephalic without obvious abnormality   Eyes:    PERRL, conjunctiva/corneas clear,        Neck:   Supple, no adenopathy, no JVD   Back:     Symmetric, no spinal or CVA tenderness   Lungs:     Clear to auscultation bilaterally,    Heart:    Regular rate and rhythm, S1 and S2 normal, no murmur   Abdomen:     Non-tender in RUQ, LUQ, RLQ, LLQ, no magallon's signs. No visible masses or pulsations.    Extremities:   Extremities normal. No clubbing, cyanosis or edema   Psych:   Normal Affect   Neurologic:   CNII-XII intact. Strength symmetric, speech intact                 Chantal Perez PA-C  Date: 4/15/2024 Time: 2:32 PM       This report has been generated by a voice recognition software system.  Therefore, there may be syntax, spelling, and/or grammatical errors.  Please call if you've any questions.    This  encounter has been billed by a non certified Coder.          Informed consent for procedure was personally discussed, reviewed, and signed by Dr. Kruger. Discussion by Dr. Kruger was carried out regarding risks, benefits, and alternatives with the patient. Risks include but are not limited to:  bleeding, infection, and delayed wound healing or an open wound, pulmonary embolus, leaks from bowel or bile ducts or other viscus, transfusions, death.  Discussed in further detail the more common complications and their rates of occurrence.   was used if necessary.  Patient expressed understanding of the issues discussed and wished/consented to proceed.  All questions were answered by Dr. Kruger.    Discussion performed between patient and the provider signing below.     Signature:   Chantal Perez PA-C  Date: 4/15/2024 Time: 2:32 PM

## 2024-04-08 NOTE — PROGRESS NOTES
Estee Damon is a 73 y.o.female who is here for a:   Chief Complaint   Patient presents with    Follow-up     H&P FOR COLONOSCOPY        Colonoscopy in 2019 showed a 3 manage Carter ascending colon polyp and a 3 mm rectosigmoid polyp.  She is now here for follow-up.  These were both tubular adenomas.        Assessment/Plan:   Estee Damon is a 73 y.o.female who is here for a: Screening Colonoscopy.       Plan: Colonoscopy for: Personal history of polyps family history of colon cancer in father    $$$On Plavix.  Patient is aware to discontinue this 5 days prior to procedure.      Previous Colonoscopy and  Location of polyps if any:   6 years ago and with polyps in the :   Ascending colon  and sigmoid colon..      Preoperative Clearance: Medical with anticavanig recs - this was received       In preparation for this visit all relevant and known prior office notes, prior consultations, emergency room visits, blood work results, and imaging studies were personally reviewed.  A total of  35 minutes was spent reviewing all of this information., caring for this patient, providing differential diagnosis, instructions for management, counseling and coordination of care.  This also includes planning surgical intervention where indicated.    Preoperative Clearance: None      Patient was given full preop instructions including:  No fruits or vegetables or high roughage foods for 1 week prior to the procedure.  Clear liquids the day before the procedure, nothing by mouth after midnight the day before for the procedure.  Bowel prep instructions were given in writing to the patient and verbally reviewed.  Notify the office if patient is on any blood thinners.  Discontinue any weight loss medications 1 week prior to procedure unless instructed specifically otherwise.    Consult primary care physician for management of any diabetic medications.    Continue beta-blockers but no other hypertensive medications prior to the  procedure.      The medication list was reviewed in its entirety with the patient for preop instructions.    All preop medication should be taken per the anesthesia guidelines and as instructed by PAT in the pre-op visit or as otherwise instructed by the patient's PCP.    The patient is instructed to check with anesthesia preop and/or PCP regarding medications, especially any anticoagulation, hypertension medications, or diabetes medications.     Patient is instructed to stop any herbal medications or over-the-counter medications one week prior to the procedure.         Current Outpatient Medications:     aspirin 81 mg chewable tablet, CHEW 1 TABLET BY MOUTH EVERY DAY, Disp: 30 tablet, Rfl: 11    atorvastatin (LIPITOR) 80 mg tablet, TAKE 1 TABLET (80 MG TOTAL) BY MOUTH DAILY, Disp: 30 tablet, Rfl: 10    Blood Pressure Monitor MISC, by Does not apply route daily, Disp: 1 each, Rfl: 0    Blood Pressure Monitoring (BLOOD PRESSURE CUFF) MISC, by Does not apply route daily, Disp: 1 each, Rfl: 0    cholecalciferol (VITAMIN D3) 1,000 units tablet, Take 1 tablet (1,000 Units total) by mouth daily, Disp: 30 tablet, Rfl: 5    clopidogrel (PLAVIX) 75 mg tablet, TAKE ONE TABLET BY MOUTH EVERY DAY, Disp: 30 tablet, Rfl: 4    Diclofenac Sodium (VOLTAREN) 1 %, APPLY TOPICALLY TO PAINFUL AREA(S) ON SKIN TWO TIMES PER DAY, Disp: 100 g, Rfl: 10    donepezil (ARICEPT) 10 mg tablet, TAKE ONE TABLET BY MOUTH DAILY, Disp: 30 tablet, Rfl: 10    Elastic Bandages & Supports (WRIST SPLINT/COCK-UP/LEFT L) MISC, by Does not apply route daily, Disp: 1 each, Rfl: 0    levETIRAcetam (KEPPRA) 750 mg tablet, TAKE 1 TABLET (750 MG TOTAL) BY MOUTH IN THE MORNING AND 1 TABLET (750 MG TOTAL) IN THE EVENING., Disp: 60 tablet, Rfl: 10    lisinopril (ZESTRIL) 10 mg tablet, TAKE ONE TABLET BY MOUTH DAILY, Disp: 30 tablet, Rfl: 10    vitamin B-12 (VITAMIN B-12) 1,000 mcg tablet, TAKE ONE TABLET BY MOUTH EVERY DAY, Disp: 30 tablet, Rfl: 10    COVID-19 At  Home Test (FLOWFLEX COVID-19 AG HOME TEST VI), , Disp: , Rfl:     cyclobenzaprine (FLEXERIL) 5 mg tablet, Take 1 tablet (5 mg total) by mouth daily at bedtime (Patient not taking: Reported on 6/1/2022), Disp: 30 tablet, Rfl: 5    potassium chloride (K-DUR,KLOR-CON) 20 mEq tablet, Take 1 tablet (20 mEq total) by mouth daily for 3 days, Disp: 3 tablet, Rfl: 0       ______________________________________________________    HPI:  Estee Damon is a 73 y.o.female who was referred for evaluation of    Screening.      Currently:  Symptoms include:  no abdominal pain, change in bowel habits, or black or bloody stools.    History of stroke, history of using Plavix, closed head injury, family history of ovarian cancer.    Family history of colon cancer: None reported       Anticoagulation: Plavix    A1C:     LABS:      Lab Results   Component Value Date    WBC 8.26 11/17/2016    HGB 12.6 02/04/2022    HCT 37 02/04/2022    MCV 88 11/17/2016     11/17/2016     Lab Results   Component Value Date    K 3.5 12/04/2018     12/04/2018    CO2 34 (H) 02/04/2022    BUN 15 12/04/2018    CREATININE 1.00 12/04/2018    GLUCOSE 109 02/04/2022    CALCIUM 9.3 12/04/2018    AST 21 12/04/2018    ALT 32 12/04/2018    ALKPHOS 143 (H) 12/04/2018    EGFR 58 12/04/2018     Lab Results   Component Value Date    HGBA1C 5.7 01/30/2024     Lab Results   Component Value Date    INR 0.98 03/11/2015    PROTIME 13.2 03/11/2015         No orders to display           ROS:  General ROS: negative for - chills, fatigue, fever or night sweats, weight loss  Respiratory ROS: no cough, shortness of breath, or wheezing  Cardiovascular ROS: no chest pain or dyspnea on exertion  Genito-Urinary ROS: no dysuria, trouble voiding, or hematuria  Musculoskeletal ROS: negative for - gait disturbance, joint pain or muscle pain  Neurological ROS: no TIA or stroke symptoms  GI ROS: see HPI  Skin ROS: no new rashes or lesions   Lymphatic ROS: no new adenopathy noted by  pt.   GYN ROS: see HPI, no new GYN history or bleeding noted  Psy ROS: no new mental or behavioral disturbances           Imaging: No new pertinent imaging studies.         Patient Active Problem List   Diagnosis    FH: colon cancer    Seizure (HCC)    HLD (hyperlipidemia)    Essential hypertension    Mild cognitive impairment    Tremor, essential    Vitamin D deficiency    Low serum vitamin B12    History of stroke    Family history of ovarian cancer    Hypokalemia    Numbness and tingling in left hand    Acute pain of left shoulder    Weight loss    Cataract    Osteoarthritis    Fall    Closed head injury    Status post placement of implantable loop recorder    Altered mental status         Allergies:  Patient has no known allergies.      Current Outpatient Medications:     aspirin 81 mg chewable tablet, CHEW 1 TABLET BY MOUTH EVERY DAY, Disp: 30 tablet, Rfl: 11    atorvastatin (LIPITOR) 80 mg tablet, TAKE 1 TABLET (80 MG TOTAL) BY MOUTH DAILY, Disp: 30 tablet, Rfl: 10    Blood Pressure Monitor MISC, by Does not apply route daily, Disp: 1 each, Rfl: 0    Blood Pressure Monitoring (BLOOD PRESSURE CUFF) MISC, by Does not apply route daily, Disp: 1 each, Rfl: 0    cholecalciferol (VITAMIN D3) 1,000 units tablet, Take 1 tablet (1,000 Units total) by mouth daily, Disp: 30 tablet, Rfl: 5    clopidogrel (PLAVIX) 75 mg tablet, TAKE ONE TABLET BY MOUTH EVERY DAY, Disp: 30 tablet, Rfl: 4    Diclofenac Sodium (VOLTAREN) 1 %, APPLY TOPICALLY TO PAINFUL AREA(S) ON SKIN TWO TIMES PER DAY, Disp: 100 g, Rfl: 10    donepezil (ARICEPT) 10 mg tablet, TAKE ONE TABLET BY MOUTH DAILY, Disp: 30 tablet, Rfl: 10    Elastic Bandages & Supports (WRIST SPLINT/COCK-UP/LEFT L) MISC, by Does not apply route daily, Disp: 1 each, Rfl: 0    levETIRAcetam (KEPPRA) 750 mg tablet, TAKE 1 TABLET (750 MG TOTAL) BY MOUTH IN THE MORNING AND 1 TABLET (750 MG TOTAL) IN THE EVENING., Disp: 60 tablet, Rfl: 10    lisinopril (ZESTRIL) 10 mg tablet, TAKE ONE  TABLET BY MOUTH DAILY, Disp: 30 tablet, Rfl: 10    vitamin B-12 (VITAMIN B-12) 1,000 mcg tablet, TAKE ONE TABLET BY MOUTH EVERY DAY, Disp: 30 tablet, Rfl: 10    COVID-19 At Home Test (FLOWFLEX COVID-19 AG HOME TEST VI), , Disp: , Rfl:     cyclobenzaprine (FLEXERIL) 5 mg tablet, Take 1 tablet (5 mg total) by mouth daily at bedtime (Patient not taking: Reported on 6/1/2022), Disp: 30 tablet, Rfl: 5    potassium chloride (K-DUR,KLOR-CON) 20 mEq tablet, Take 1 tablet (20 mEq total) by mouth daily for 3 days, Disp: 3 tablet, Rfl: 0    Past Medical History:   Diagnosis Date    Diverticulosis     Hypertension     Morbid obesity (HCC)     Osteoarthritis 2/7/2022    Pancreatic cyst     Seizures (HCC)     last seizure 2013    Stroke (HCC)     Type 2 diabetes mellitus without complication, without long-term current use of insulin (HCC) 12/10/2018    Wears glasses        Past Surgical History:   Procedure Laterality Date    APPENDECTOMY      COLONOSCOPY      complete    EYE SURGERY      MD COLONOSCOPY FLX DX W/COLLJ SPEC WHEN PFRMD N/A 03/06/2018    Procedure: COLONOSCOPY with polypectomy;  Surgeon: Jaymie Kruger MD;  Location: [facility];  Service: General       Family History   Problem Relation Age of Onset    Stroke Father     Colon cancer Father     Lung cancer Maternal Grandfather     Ovarian cancer Mother         Left ovary     Other Other         MSH6-related Powers syndrome     Stroke Family     Ovarian cancer Family     Prostate cancer Paternal Grandmother     Ovarian cancer Family     Breast cancer Family 42    Lung cancer Brother        Social History     Socioeconomic History    Marital status: Single     Spouse name: None    Number of children: None    Years of education: None    Highest education level: None   Occupational History    None   Tobacco Use    Smoking status: Never    Smokeless tobacco: Never   Vaping Use    Vaping status: Never Used   Substance and Sexual Activity    Alcohol use: No    Drug use: No     Sexual activity: Never   Other Topics Concern    None   Social History Narrative    Living in assisted living facility      Social Determinants of Health     Financial Resource Strain: Low Risk  (1/30/2024)    Overall Financial Resource Strain (CARDIA)     Difficulty of Paying Living Expenses: Not hard at all   Food Insecurity: No Food Insecurity (1/30/2024)    Hunger Vital Sign     Worried About Running Out of Food in the Last Year: Never true     Ran Out of Food in the Last Year: Never true   Transportation Needs: No Transportation Needs (1/30/2024)    PRAPARE - Transportation     Lack of Transportation (Medical): No     Lack of Transportation (Non-Medical): No   Physical Activity: Insufficiently Active (1/30/2024)    Exercise Vital Sign     Days of Exercise per Week: 5 days     Minutes of Exercise per Session: 20 min   Stress: No Stress Concern Present (1/30/2024)    [employer] - Occupational Stress Questionnaire     Feeling of Stress : Not at all   Social Connections: Socially Isolated (1/30/2024)    Social Connection and Isolation Panel [NHANES]     Frequency of Communication with Friends and Family: Never     Frequency of Social Gatherings with Friends and Family: Never     Attends [employer] Services: 1 to 4 times per year     Active Member of Clubs or Organizations: No     Attends Club or Organization Meetings: Never     Marital Status: Never    Intimate Partner Violence: Not At Risk (1/30/2024)    Humiliation, Afraid, Rape, and Kick questionnaire     Fear of Current or Ex-Partner: No     Emotionally Abused: No     Physically Abused: No     Sexually Abused: No   Housing Stability: Unknown (1/30/2024)    Housing Stability Vital Sign     Unable to Pay for Housing in the Last Year: No     Number of Places Lived in the Last Year: Not on file     Unstable Housing in the Last Year: No       Exam:   Vitals:    04/15/24 1427   BP: 122/77   Pulse: 77   SpO2: 96%              ______________________________________________________    PHYSICAL EXAM  General Appearance:    Alert, cooperative, no distress,      Head:    Normocephalic without obvious abnormality   Eyes:    PERRL, conjunctiva/corneas clear,        Neck:   Supple, no adenopathy, no JVD   Back:     Symmetric, no spinal or CVA tenderness   Lungs:     Clear to auscultation bilaterally,    Heart:    Regular rate and rhythm, S1 and S2 normal, no murmur   Abdomen:     Non-tender in RUQ, LUQ, RLQ, LLQ, no magallon's signs. No visible masses or pulsations.    Extremities:   Extremities normal. No clubbing, cyanosis or edema   Psych:   Normal Affect   Neurologic:   CNII-XII intact. Strength symmetric, speech intact                 Chantal Perez PA-C  Date: 4/15/2024 Time: 2:32 PM       This report has been generated by a voice recognition software system.  Therefore, there may be syntax, spelling, and/or grammatical errors.  Please call if you've any questions.    This  encounter has been billed by a non certified Coder.          Informed consent for procedure was personally discussed, reviewed, and signed by Dr. Kruger. Discussion by Dr. Kruger was carried out regarding risks, benefits, and alternatives with the patient. Risks include but are not limited to:  bleeding, infection, and delayed wound healing or an open wound, pulmonary embolus, leaks from bowel or bile ducts or other viscus, transfusions, death.  Discussed in further detail the more common complications and their rates of occurrence.   was used if necessary.  Patient expressed understanding of the issues discussed and wished/consented to proceed.  All questions were answered by Dr. Kruger.    Discussion performed between patient and the provider signing below.     Signature:   Chantal Perez PA-C  Date: 4/15/2024 Time: 2:32 PM

## 2024-04-09 ENCOUNTER — TELEPHONE (OUTPATIENT)
Age: 74
End: 2024-04-09

## 2024-04-09 NOTE — TELEPHONE ENCOUNTER
Calvin from [facility] called in with some questions regarding patients upcoming appointment. She can be reached at [phone] and ask for the wellness department.

## 2024-04-12 ENCOUNTER — TELEPHONE (OUTPATIENT)
Age: 74
End: 2024-04-12

## 2024-04-15 ENCOUNTER — TELEPHONE (OUTPATIENT)
Dept: FAMILY MEDICINE CLINIC | Facility: CLINIC | Age: 74
End: 2024-04-15

## 2024-04-15 ENCOUNTER — OFFICE VISIT (OUTPATIENT)
Dept: SURGERY | Facility: CLINIC | Age: 74
End: 2024-04-15
Payer: MEDICARE

## 2024-04-15 VITALS
HEART RATE: 77 BPM | DIASTOLIC BLOOD PRESSURE: 77 MMHG | OXYGEN SATURATION: 96 % | SYSTOLIC BLOOD PRESSURE: 122 MMHG | BODY MASS INDEX: 31.34 KG/M2 | HEIGHT: 66 IN | WEIGHT: 195 LBS

## 2024-04-15 DIAGNOSIS — Z80.0 FH: COLON CANCER: ICD-10-CM

## 2024-04-15 DIAGNOSIS — Z12.11 SCREENING FOR COLON CANCER: Primary | ICD-10-CM

## 2024-04-15 PROCEDURE — 99212 OFFICE O/P EST SF 10 MIN: CPT | Performed by: PHYSICIAN ASSISTANT

## 2024-04-15 NOTE — PROGRESS NOTES
Patient should hold aspirin and Plavix for 5-7 days prior to colonoscopy in the event that samples are taken.    Roxie Galeano, DO PGY-3  Family Medicine

## 2024-04-15 NOTE — TELEPHONE ENCOUNTER
Veena from [facility] surgery called stating that they need anticoagulation instructions for upcoming colonoscopy procedure. Please advise and place a note in patient's chart

## 2024-04-16 ENCOUNTER — TELEPHONE (OUTPATIENT)
Dept: FAMILY MEDICINE CLINIC | Facility: CLINIC | Age: 74
End: 2024-04-16

## 2024-04-16 DIAGNOSIS — J98.8 CONGESTION OF UPPER AIRWAY: ICD-10-CM

## 2024-04-16 DIAGNOSIS — R05.1 ACUTE COUGH: Primary | ICD-10-CM

## 2024-04-16 RX ORDER — GUAIFENESIN 200 MG/10ML
200 LIQUID ORAL 3 TIMES DAILY PRN
Qty: 120 ML | Refills: 0 | Status: SHIPPED | OUTPATIENT
Start: 2024-04-16 | End: 2024-04-23

## 2024-04-16 RX ORDER — BENZONATATE 100 MG/1
100 CAPSULE ORAL 3 TIMES DAILY PRN
Qty: 20 CAPSULE | Refills: 0 | Status: SHIPPED | OUTPATIENT
Start: 2024-04-16 | End: 2024-04-23

## 2024-04-16 NOTE — PROGRESS NOTES
D/t symptoms of cough & congestion, will prescribe guaifenisin and tessalon pearls x7 days. Pt remains afebrile. Likely 2/2 viral URI.    Roxie Galeano, DO PGY-3  Family Medicine

## 2024-04-16 NOTE — TELEPHONE ENCOUNTER
Folder (Dr Galeano) -     Name of Form -[facility]    Color folder ([place] Folder)     Form to be Faxed ([phone]),  -    Patient was made aware of the 7-10 business day form policy.

## 2024-04-26 ENCOUNTER — TELEPHONE (OUTPATIENT)
Dept: SURGERY | Facility: CLINIC | Age: 74
End: 2024-04-26

## 2024-04-27 RX ORDER — SODIUM CHLORIDE 9 MG/ML
125 INJECTION, SOLUTION INTRAVENOUS CONTINUOUS
Status: CANCELLED | OUTPATIENT
Start: 2024-04-27

## 2024-04-30 ENCOUNTER — ANESTHESIA (OUTPATIENT)
Dept: GASTROENTEROLOGY | Facility: HOSPITAL | Age: 74
End: 2024-04-30

## 2024-04-30 ENCOUNTER — HOSPITAL ENCOUNTER (OUTPATIENT)
Dept: GASTROENTEROLOGY | Facility: HOSPITAL | Age: 74
Setting detail: OUTPATIENT SURGERY
Discharge: HOME | End: 2024-04-30
Attending: SURGERY
Payer: MEDICARE

## 2024-04-30 ENCOUNTER — ANESTHESIA EVENT (OUTPATIENT)
Dept: GASTROENTEROLOGY | Facility: HOSPITAL | Age: 74
End: 2024-04-30

## 2024-04-30 VITALS
BODY MASS INDEX: 31.34 KG/M2 | WEIGHT: 195 LBS | SYSTOLIC BLOOD PRESSURE: 147 MMHG | TEMPERATURE: 98 F | HEART RATE: 67 BPM | DIASTOLIC BLOOD PRESSURE: 67 MMHG | RESPIRATION RATE: 16 BRPM | HEIGHT: 66 IN | OXYGEN SATURATION: 97 %

## 2024-04-30 DIAGNOSIS — Z12.11 ENCOUNTER FOR SCREENING COLONOSCOPY: ICD-10-CM

## 2024-04-30 PROCEDURE — G0105 COLORECTAL SCRN; HI RISK IND: HCPCS | Performed by: SURGERY

## 2024-04-30 RX ORDER — SODIUM CHLORIDE 9 MG/ML
125 INJECTION, SOLUTION INTRAVENOUS CONTINUOUS
Status: DISCONTINUED | OUTPATIENT
Start: 2024-04-30 | End: 2024-05-04 | Stop reason: HOSPADM

## 2024-04-30 RX ORDER — PROPOFOL 10 MG/ML
INJECTION, EMULSION INTRAVENOUS AS NEEDED
Status: DISCONTINUED | OUTPATIENT
Start: 2024-04-30 | End: 2024-04-30

## 2024-04-30 RX ADMIN — PROPOFOL 50 MG: 10 INJECTION, EMULSION INTRAVENOUS at 09:00

## 2024-04-30 RX ADMIN — PROPOFOL 100 MG: 10 INJECTION, EMULSION INTRAVENOUS at 08:51

## 2024-04-30 RX ADMIN — SODIUM CHLORIDE 125 ML/HR: 0.9 INJECTION, SOLUTION INTRAVENOUS at 08:06

## 2024-04-30 RX ADMIN — PROPOFOL 30 MG: 10 INJECTION, EMULSION INTRAVENOUS at 09:03

## 2024-04-30 RX ADMIN — PROPOFOL 50 MG: 10 INJECTION, EMULSION INTRAVENOUS at 08:55

## 2024-04-30 NOTE — ANESTHESIA PREPROCEDURE EVALUATION
Medical History    History Comments   Stroke (HCC)    Diverticulosis    Hypertension    Morbid obesity (HCC)    Seizures (HCC) last seizure 2013   Pancreatic cyst    Wears glasses    Type 2 diabetes mellitus without complication, without long-term current use of insulin (HCC)    Osteoarthritis    Procedure:  COLONOSCOPY    Relevant Problems   ANESTHESIA (within normal limits)      CARDIO   (+) Essential hypertension   (+) HLD (hyperlipidemia)      MUSCULOSKELETAL   (+) Osteoarthritis      NEURO/PSYCH   (+) Numbness and tingling in left hand   (+) Seizure (HCC)        Physical Exam    Airway    Mallampati score: II  TM Distance: >3 FB  Neck ROM: full     Dental       Cardiovascular  Rate: normal    Pulmonary  Pulmonary exam normal     Other Findings  Per pt denies anything remaining that is loose or removeablepost-pubertal.      Anesthesia Plan  ASA Score- 3     Anesthesia Type- IV sedation with anesthesia with ASA Monitors.         Additional Monitors:     Airway Plan:     Comment: Per patient, appropriately NPO, denies active CP/SOB/wheezing/symptoms related to heartburn/nausea/vomiting  .       Plan Factors-Exercise tolerance (METS): >4 METS.    Chart reviewed.    Patient summary reviewed.    Patient is not a current smoker.              Induction- intravenous.    Postoperative Plan-     Informed Consent- Anesthetic plan and risks discussed with patient.  I personally reviewed this patient with the CRNA. Discussed and agreed on the Anesthesia Plan with the CRNA..

## 2024-04-30 NOTE — INTERVAL H&P NOTE
H&P reviewed. After examining the patient I find no changes in the patients condition since the H&P had been written.    Vitals:    04/30/24 0804   BP: 162/70   Pulse: 64   Resp: 16   Temp: 98.6 °F (37 °C)   SpO2: 96%

## 2024-04-30 NOTE — ANESTHESIA POSTPROCEDURE EVALUATION
Post-Op Assessment Note    CV Status:  Stable    Pain management: satisfactory to patient       Mental Status:  Alert, awake and sleepy   Hydration Status:  Euvolemic   PONV Controlled:  Controlled   Airway Patency:  Patent     Post Op Vitals Reviewed: Yes    No anethesia notable event occurred.    Staff: Anesthesiologist, CRNA         AVSS

## 2024-04-30 NOTE — DISCHARGE INSTR - AVS FIRST PAGE
[facility]  Endoscopy Post-Operative Instructions  Dr. Jaymie Kruger MD, FACS    Procedure: Colonoscopy    Findings:  Diverticulosis and Hemorrhoids--no polyps this time!    Follow-Up: You will need a repeat Endoscopy in (generally) in 5 years      You should have an endoscopy sooner than recommended if you have any symptoms of bleeding or change in stools or other concerns.      You will receive a call from our office with your results, in addition to the the preliminary results you received today.      You will usually receive a follow-up letter from our office in 1-2 weeks.  Call the office if you do not hear from us. You are welcome to also schedule an office visit if desired to discuss the results further.     It is your responsibility to contact our office for results in 1- 2 weeks if you do not hear from us.    If a follow up endoscopy is needed, you are responsible for arranging that follow up appointment at the appropriate time.  The office may or may not issue a reminder at that future time.  Please take responsibility for your own follow up healthcare.      Diet: Eat a light snack first, and then resume your previous diet.    Call the office if you have unusual fevers, chills, nausea or vomiting or abdominal pain.  Report to the emergency room with these are severe in nature.      Activity: Do not drive a car, operate machinery, or sign legal documents for 24 hours after your procedure. Normal activity may be resumed on the day following the procedure.     Call the office at [phone] for any of the following: Severe abdominal pain, significant rectal bleeding, chills, or fever above 100°, new onset of persistent cough or persistent vomiting.     [address], [address]  [place] [address]  Phone: [phone]

## 2024-05-01 ENCOUNTER — TELEPHONE (OUTPATIENT)
Dept: SURGERY | Facility: CLINIC | Age: 74
End: 2024-05-01

## 2024-05-01 NOTE — TELEPHONE ENCOUNTER
Post op colonoscopy call:    Spoke with Nancy patients niece states she has not heard from the assisted nursing home were her aunt resides which is good means her aunt is doing well.  Advised her no polyps were found patient is good for 5 years.

## 2024-05-14 DIAGNOSIS — E55.9 VITAMIN D DEFICIENCY: ICD-10-CM

## 2024-05-14 DIAGNOSIS — I63.9 CEREBROVASCULAR ACCIDENT (CVA), UNSPECIFIED MECHANISM (HCC): ICD-10-CM

## 2024-05-14 RX ORDER — CLOPIDOGREL BISULFATE 75 MG/1
75 TABLET ORAL DAILY
Qty: 30 TABLET | Refills: 4 | Status: SHIPPED | OUTPATIENT
Start: 2024-05-14

## 2024-05-14 RX ORDER — MULTIVIT-MIN/IRON/FOLIC ACID/K 18-600-40
CAPSULE ORAL
Qty: 30 TABLET | Refills: 5 | Status: SHIPPED | OUTPATIENT
Start: 2024-05-14

## 2024-05-14 NOTE — TELEPHONE ENCOUNTER
Will provide refill. Pt is due for CMP, which was ordered.    Roxie Galeano, DO PGY-3  Family Medicine

## 2024-05-30 ENCOUNTER — HOSPITAL ENCOUNTER (OUTPATIENT)
Dept: MAMMOGRAPHY | Facility: HOSPITAL | Age: 74
Discharge: HOME | End: 2024-05-30
Payer: MEDICARE

## 2024-05-30 VITALS — BODY MASS INDEX: 31.36 KG/M2 | HEIGHT: 66 IN | WEIGHT: 195.11 LBS

## 2024-05-30 DIAGNOSIS — Z12.31 ENCOUNTER FOR SCREENING MAMMOGRAM FOR MALIGNANT NEOPLASM OF BREAST: ICD-10-CM

## 2024-05-30 PROCEDURE — 77067 SCR MAMMO BI INCL CAD: CPT

## 2024-05-30 PROCEDURE — 77063 BREAST TOMOSYNTHESIS BI: CPT

## 2024-07-16 ENCOUNTER — HOSPITAL ENCOUNTER (OUTPATIENT)
Dept: MAMMOGRAPHY | Facility: CLINIC | Age: 74
Discharge: HOME | End: 2024-07-16
Payer: MEDICARE

## 2024-07-16 DIAGNOSIS — R92.8 ABNORMAL SCREENING MAMMOGRAM: ICD-10-CM

## 2024-07-16 PROCEDURE — 77065 DX MAMMO INCL CAD UNI: CPT

## 2024-07-17 DIAGNOSIS — Z12.31 ENCOUNTER FOR SCREENING MAMMOGRAM FOR MALIGNANT NEOPLASM OF BREAST: Primary | ICD-10-CM

## 2024-08-08 DIAGNOSIS — M19.90 OSTEOARTHRITIS, UNSPECIFIED OSTEOARTHRITIS TYPE, UNSPECIFIED SITE: ICD-10-CM

## 2024-08-14 DIAGNOSIS — R56.9 SEIZURE (HCC): ICD-10-CM

## 2024-08-15 RX ORDER — LEVETIRACETAM 750 MG/1
750 TABLET ORAL 2 TIMES DAILY
Qty: 60 TABLET | Refills: 10 | Status: SHIPPED | OUTPATIENT
Start: 2024-08-15

## 2024-08-26 ENCOUNTER — TELEPHONE (OUTPATIENT)
Dept: FAMILY MEDICINE CLINIC | Facility: CLINIC | Age: 74
End: 2024-08-26

## 2024-08-26 DIAGNOSIS — M19.90 OSTEOARTHRITIS, UNSPECIFIED OSTEOARTHRITIS TYPE, UNSPECIFIED SITE: ICD-10-CM

## 2024-08-26 NOTE — TELEPHONE ENCOUNTER
Signature requested    Dr Froylan Grimm Preceptor     [facility]   Incident report     Folder [facility]    Fax [phone]

## 2024-08-29 ENCOUNTER — TELEPHONE (OUTPATIENT)
Dept: FAMILY MEDICINE CLINIC | Facility: CLINIC | Age: 74
End: 2024-08-29

## 2024-10-09 ENCOUNTER — TELEPHONE (OUTPATIENT)
Dept: FAMILY MEDICINE CLINIC | Facility: CLINIC | Age: 74
End: 2024-10-09

## 2024-10-09 NOTE — TELEPHONE ENCOUNTER
Folder (To be completed by) -  Dr. Packer     Name of Form - [facility] Encounter #[id number]     Color folder ([place])    Form to be Faxed   [phone]    Patient was made aware of the 7-10 business day form policy.

## 2024-10-16 DIAGNOSIS — I10 ESSENTIAL HYPERTENSION: ICD-10-CM

## 2024-10-16 DIAGNOSIS — I63.9 CEREBROVASCULAR ACCIDENT (CVA), UNSPECIFIED MECHANISM (HCC): ICD-10-CM

## 2024-10-16 DIAGNOSIS — G31.84 MILD COGNITIVE IMPAIRMENT: ICD-10-CM

## 2024-10-17 RX ORDER — CLOPIDOGREL BISULFATE 75 MG/1
75 TABLET ORAL DAILY
Qty: 30 TABLET | Refills: 4 | OUTPATIENT
Start: 2024-10-17

## 2024-10-17 RX ORDER — DONEPEZIL HYDROCHLORIDE 10 MG/1
TABLET, FILM COATED ORAL
Qty: 30 TABLET | Refills: 0 | Status: SHIPPED | OUTPATIENT
Start: 2024-10-17

## 2024-10-17 RX ORDER — LISINOPRIL 10 MG/1
TABLET ORAL
Qty: 30 TABLET | Refills: 0 | Status: SHIPPED | OUTPATIENT
Start: 2024-10-17

## 2024-10-23 ENCOUNTER — TELEPHONE (OUTPATIENT)
Dept: FAMILY MEDICINE CLINIC | Facility: CLINIC | Age: 74
End: 2024-10-23

## 2024-10-23 NOTE — TELEPHONE ENCOUNTER
Folder (To be completed by) -Dr. Packer     Name of Form - [facility]      Form to be Faxed [phone]    Patient was made aware of the 7-10 business day form policy.

## 2024-10-29 NOTE — TELEPHONE ENCOUNTER
Main from [facility] called to request that form be faxed to a different number:    [phone]    Form refaxed on 10/29/24

## 2024-11-07 DIAGNOSIS — G31.84 MILD COGNITIVE IMPAIRMENT: ICD-10-CM

## 2024-11-08 RX ORDER — DONEPEZIL HYDROCHLORIDE 10 MG/1
TABLET, FILM COATED ORAL
Qty: 30 TABLET | Refills: 4 | Status: SHIPPED | OUTPATIENT
Start: 2024-11-08

## 2024-11-15 DIAGNOSIS — E55.9 VITAMIN D DEFICIENCY: ICD-10-CM

## 2024-11-15 DIAGNOSIS — I63.9 CEREBROVASCULAR ACCIDENT (CVA), UNSPECIFIED MECHANISM (HCC): ICD-10-CM

## 2024-11-15 DIAGNOSIS — E78.49 OTHER HYPERLIPIDEMIA: ICD-10-CM

## 2024-11-15 RX ORDER — MULTIVIT-MIN/IRON/FOLIC ACID/K 18-600-40
CAPSULE ORAL
Qty: 30 TABLET | Refills: 5 | Status: SHIPPED | OUTPATIENT
Start: 2024-11-15

## 2024-11-15 RX ORDER — ATORVASTATIN CALCIUM 80 MG/1
80 TABLET, FILM COATED ORAL DAILY
Qty: 90 TABLET | Refills: 0 | Status: SHIPPED | OUTPATIENT
Start: 2024-11-15 | End: 2025-02-13

## 2024-11-15 RX ORDER — CLOPIDOGREL BISULFATE 75 MG/1
75 TABLET ORAL DAILY
Qty: 30 TABLET | Refills: 2 | Status: SHIPPED | OUTPATIENT
Start: 2024-11-15

## 2024-11-26 ENCOUNTER — OFFICE VISIT (OUTPATIENT)
Dept: FAMILY MEDICINE CLINIC | Facility: CLINIC | Age: 74
End: 2024-11-26

## 2024-11-26 VITALS
OXYGEN SATURATION: 97 % | TEMPERATURE: 98.1 F | HEIGHT: 66 IN | BODY MASS INDEX: 29.25 KG/M2 | SYSTOLIC BLOOD PRESSURE: 144 MMHG | DIASTOLIC BLOOD PRESSURE: 86 MMHG | HEART RATE: 71 BPM | WEIGHT: 182 LBS

## 2024-11-26 DIAGNOSIS — Z23 ENCOUNTER FOR IMMUNIZATION: ICD-10-CM

## 2024-11-26 DIAGNOSIS — E78.49 OTHER HYPERLIPIDEMIA: ICD-10-CM

## 2024-11-26 DIAGNOSIS — Z13.1 SCREENING FOR DIABETES MELLITUS (DM): ICD-10-CM

## 2024-11-26 DIAGNOSIS — Z79.899 MEDICATION MANAGEMENT: Primary | ICD-10-CM

## 2024-11-26 DIAGNOSIS — G31.84 MILD COGNITIVE IMPAIRMENT: ICD-10-CM

## 2024-11-26 DIAGNOSIS — Z86.73 HISTORY OF STROKE: ICD-10-CM

## 2024-11-26 DIAGNOSIS — I10 ESSENTIAL HYPERTENSION: ICD-10-CM

## 2024-11-26 PROCEDURE — 99213 OFFICE O/P EST LOW 20 MIN: CPT | Performed by: FAMILY MEDICINE

## 2024-11-26 PROCEDURE — G2211 COMPLEX E/M VISIT ADD ON: HCPCS | Performed by: FAMILY MEDICINE

## 2024-11-26 NOTE — ASSESSMENT & PLAN NOTE
-Patient to establish care with neurology as soon as possible  Orders:    Ambulatory Referral to Neurology; Future

## 2024-11-26 NOTE — ASSESSMENT & PLAN NOTE
- Patient resides at [facility]  -She has not been seen in the office for the past year.  Per chart review she has not followed with cardiology or neurology in the past year   -Patient has a history of loop recorder however siblings who accompany her think that the loop recorder   -Siblings unsure if patient is compliant with the medications as documentaries and when he distributes them  -Blank states her daughter is patient's main caretaker and keeps track of her appts    As there are no recent labs on file, will continue patient's current medication regimen at this time until blood work is completed.      Plan:  -Patient to re-establish care with Cardiology and Neurology immediately   - Group home to check patient's BP BID  -F/U labs: CBC, CMP, lipid panel and A1c ordered   - Patient to f/u in 2 weeks   -Referral to complex care management to ensure that [facility] is taking patient to appropriate appointments and receiving medication as prescribed    Orders:    Ambulatory Referral to Complex Care Management Program; Future

## 2024-11-26 NOTE — ASSESSMENT & PLAN NOTE
- Patient to establish care with cardiology and neurology as soon as possible  -To continue current medication regimen  Orders:    Lipid panel; Future    Ambulatory Referral to Cardiology; Future    Ambulatory Referral to Neurology; Future

## 2024-11-26 NOTE — ASSESSMENT & PLAN NOTE
-BP is chronic, uncontrolled  -Initial /78, repeat manually was 144/86  - She denies visual changes, chest pain, palpitations, headache, difficulty urinating, SOB  -As there are no baseline labs will continue current medication regimen at this time until blood work is completed.    Plan:   -Follow-up 2 weeks after labs   Orders:    Comprehensive metabolic panel; Future    CBC and differential; Future

## 2024-11-26 NOTE — PROGRESS NOTES
Name: Estee Damon      : 1950      MRN: [id number]  Encounter Provider: Domenica Matos MD  Encounter Date: 2024   Encounter department: [facility] [place]  :  Assessment & Plan  Medication management  - Patient resides at [facility]  -She has not been seen in the office for the past year.  Per chart review she has not followed with cardiology or neurology in the past year   -Patient has a history of loop recorder however siblings who accompany her think that the loop recorder   -Siblings unsure if patient is compliant with the medications as documentaries and when he distributes them  -Blank states her daughter is patient's main caretaker and keeps track of her appts    As there are no recent labs on file, will continue patient's current medication regimen at this time until blood work is completed.      Plan:  -Patient to re-establish care with Cardiology and Neurology immediately   - Group home to check patient's BP BID  -F/U labs: CBC, CMP, lipid panel and A1c ordered   - Patient to f/u in 2 weeks   -Referral to complex care management to ensure that [facility] is taking patient to appropriate appointments and receiving medication as prescribed    Orders:    Ambulatory Referral to Complex Care Management Program; Future    Essential hypertension  -BP is chronic, uncontrolled  -Initial /78, repeat manually was 144/86  - She denies visual changes, chest pain, palpitations, headache, difficulty urinating, SOB  -As there are no baseline labs will continue current medication regimen at this time until blood work is completed.    Plan:   -Follow-up 2 weeks after labs   Orders:    Comprehensive metabolic panel; Future    CBC and differential; Future    Other hyperlipidemia  -Follow-up lab  Orders:    Lipid panel; Future    Mild cognitive impairment  -Patient to establish care with neurology as soon as possible  Orders:    Ambulatory Referral to Neurology;  "Future    History of stroke  - Patient to establish care with cardiology and neurology as soon as possible  -To continue current medication regimen  Orders:    Lipid panel; Future    Ambulatory Referral to Cardiology; Future    Ambulatory Referral to Neurology; Future    Screening for diabetes mellitus (DM)  - Follow-up labs  Orders:    Hemoglobin A1C; Future         History of Present Illness     Patient is a 74 year old woman here today for medication management from [facility].  Patient has not been seen in the office for the past year and has no recent labs. She is accompanied by her siblings.  Neither siblings nor patient report any side effects nor concerns about any of her medications. She reports compliance with all medications.  She endorses getting very occasional episodes of diarrhea. She denies any SOB, chest pain, fever, weight change, dizziness, arthralgias/myalgias, or rashes.  Siblings brought patient today for medication management and review but have no concerns at this time.      Review of Systems   Constitutional:  Negative for appetite change, chills, fever and unexpected weight change.   HENT:  Negative for tinnitus.    Respiratory:  Negative for chest tightness and shortness of breath.    Cardiovascular:  Negative for chest pain and palpitations.   Gastrointestinal:  Positive for diarrhea. Negative for abdominal pain, blood in stool, constipation, nausea and vomiting.   Musculoskeletal:  Negative for arthralgias and myalgias.   Skin:  Negative for rash and wound.   Neurological:  Positive for tremors and facial asymmetry. Negative for dizziness, seizures, syncope, weakness, light-headedness and headaches.        History of multiple prior strokes   Psychiatric/Behavioral:  Negative for agitation, behavioral problems, confusion, hallucinations and sleep disturbance.           Objective   /86   Pulse 71   Temp 98.1 °F (36.7 °C) (Temporal)   Ht 5' 6\" (1.676 m)   Wt 82.6 kg (182 " lb)   SpO2 97%   BMI 29.38 kg/m²      Physical Exam  Vitals reviewed.   HENT:      Head: Normocephalic and atraumatic.      Right Ear: External ear normal.      Left Ear: External ear normal.   Eyes:      Conjunctiva/sclera: Conjunctivae normal.   Cardiovascular:      Rate and Rhythm: Normal rate and regular rhythm.      Pulses: Normal pulses.      Heart sounds: Normal heart sounds.   Pulmonary:      Effort: Pulmonary effort is normal.      Breath sounds: Normal breath sounds.   Abdominal:      General: Bowel sounds are normal.   Skin:     General: Skin is warm and dry.   Neurological:      Mental Status: She is alert.      Comments: Essential tremor               Domenica Matos MD   PGY-2, Family Medicine  [facility]

## 2024-12-05 ENCOUNTER — PATIENT OUTREACH (OUTPATIENT)
Dept: FAMILY MEDICINE CLINIC | Facility: CLINIC | Age: 74
End: 2024-12-05

## 2024-12-05 NOTE — PROGRESS NOTES
Outpatient RN Care Manager Note    Re:  New patient referral    Chart reviewed.    Patient was seen in office on 11/26/24 and was referred to complex care management to ensure [facility] is taking patient to appropriate appointments and receiving medications as prescribed.    Called and spoke to abbie's niece Nancy Allen, listed on patient's communication consent.  I introduced myself and explained my role in office.  Niece state her parents live close to the patient in [place] and niece resides in patient's prior home in [place].  Niece is abbie's POA. Niece states patient is getting her medications from [facility] staff as prescribed.  Appointments for Cardiology and Neurology have been scheduled.  I advised niemma of lab testing to be performed and she states [facility] staff will obtain specimens. Niece states patient will need to schedule MAW visit. Chart review indicates MAW visit due 1/30/25.  Will have Clerical staff contact patient to schedule appointment.  Confirmed upcoming appointment with Dr. Matos on 1/03/25 at 9:50, Cardiology on 1/30/25 and Neurology on 2/20/25.  Niemma has no further care needs or questions regarding patient.  Will close to complex care management.

## 2024-12-06 DIAGNOSIS — I10 ESSENTIAL HYPERTENSION: ICD-10-CM

## 2024-12-06 RX ORDER — LISINOPRIL 10 MG/1
10 TABLET ORAL DAILY
Qty: 30 TABLET | Refills: 0 | Status: SHIPPED | OUTPATIENT
Start: 2024-12-06

## 2024-12-11 ENCOUNTER — APPOINTMENT (OUTPATIENT)
Dept: LAB | Facility: CLINIC | Age: 74
End: 2024-12-11
Payer: MEDICARE

## 2024-12-11 DIAGNOSIS — I10 ESSENTIAL HYPERTENSION: ICD-10-CM

## 2024-12-11 DIAGNOSIS — Z86.73 HISTORY OF STROKE: ICD-10-CM

## 2024-12-11 DIAGNOSIS — Z13.1 SCREENING FOR DIABETES MELLITUS (DM): ICD-10-CM

## 2024-12-11 DIAGNOSIS — I63.9 CEREBROVASCULAR ACCIDENT (CVA), UNSPECIFIED MECHANISM (HCC): ICD-10-CM

## 2024-12-11 DIAGNOSIS — E78.49 OTHER HYPERLIPIDEMIA: ICD-10-CM

## 2024-12-11 LAB
ALBUMIN SERPL BCG-MCNC: 4.1 G/DL (ref 3.5–5)
ALP SERPL-CCNC: 116 U/L (ref 34–104)
ALT SERPL W P-5'-P-CCNC: 18 U/L (ref 7–52)
ANION GAP SERPL CALCULATED.3IONS-SCNC: 11 MMOL/L (ref 4–13)
AST SERPL W P-5'-P-CCNC: 14 U/L (ref 13–39)
BASOPHILS # BLD AUTO: 0.06 THOUSANDS/ÂΜL (ref 0–0.1)
BASOPHILS NFR BLD AUTO: 1 % (ref 0–1)
BILIRUB SERPL-MCNC: 0.68 MG/DL (ref 0.2–1)
BUN SERPL-MCNC: 12 MG/DL (ref 5–25)
CALCIUM SERPL-MCNC: 9 MG/DL (ref 8.4–10.2)
CHLORIDE SERPL-SCNC: 103 MMOL/L (ref 96–108)
CHOLEST SERPL-MCNC: 102 MG/DL (ref ?–200)
CO2 SERPL-SCNC: 28 MMOL/L (ref 21–32)
CREAT SERPL-MCNC: 0.86 MG/DL (ref 0.6–1.3)
EOSINOPHIL # BLD AUTO: 0.15 THOUSAND/ÂΜL (ref 0–0.61)
EOSINOPHIL NFR BLD AUTO: 2 % (ref 0–6)
ERYTHROCYTE [DISTWIDTH] IN BLOOD BY AUTOMATED COUNT: 12.8 % (ref 11.6–15.1)
EST. AVERAGE GLUCOSE BLD GHB EST-MCNC: 126 MG/DL
GFR SERPL CREATININE-BSD FRML MDRD: 66 ML/MIN/1.73SQ M
GLUCOSE P FAST SERPL-MCNC: 114 MG/DL (ref 65–99)
HBA1C MFR BLD: 6 %
HCT VFR BLD AUTO: 46 % (ref 34.8–46.1)
HDLC SERPL-MCNC: 51 MG/DL
HGB BLD-MCNC: 14.9 G/DL (ref 11.5–15.4)
IMM GRANULOCYTES # BLD AUTO: 0.01 THOUSAND/UL (ref 0–0.2)
IMM GRANULOCYTES NFR BLD AUTO: 0 % (ref 0–2)
LDLC SERPL CALC-MCNC: 33 MG/DL (ref 0–100)
LYMPHOCYTES # BLD AUTO: 1.5 THOUSANDS/ÂΜL (ref 0.6–4.47)
LYMPHOCYTES NFR BLD AUTO: 24 % (ref 14–44)
MCH RBC QN AUTO: 30.6 PG (ref 26.8–34.3)
MCHC RBC AUTO-ENTMCNC: 32.4 G/DL (ref 31.4–37.4)
MCV RBC AUTO: 95 FL (ref 82–98)
MONOCYTES # BLD AUTO: 0.48 THOUSAND/ÂΜL (ref 0.17–1.22)
MONOCYTES NFR BLD AUTO: 8 % (ref 4–12)
NEUTROPHILS # BLD AUTO: 4.17 THOUSANDS/ÂΜL (ref 1.85–7.62)
NEUTS SEG NFR BLD AUTO: 65 % (ref 43–75)
NONHDLC SERPL-MCNC: 51 MG/DL
NRBC BLD AUTO-RTO: 0 /100 WBCS
PLATELET # BLD AUTO: 262 THOUSANDS/UL (ref 149–390)
PMV BLD AUTO: 11 FL (ref 8.9–12.7)
POTASSIUM SERPL-SCNC: 4.2 MMOL/L (ref 3.5–5.3)
PROT SERPL-MCNC: 6.5 G/DL (ref 6.4–8.4)
RBC # BLD AUTO: 4.87 MILLION/UL (ref 3.81–5.12)
SODIUM SERPL-SCNC: 142 MMOL/L (ref 135–147)
TRIGL SERPL-MCNC: 92 MG/DL (ref ?–150)
WBC # BLD AUTO: 6.37 THOUSAND/UL (ref 4.31–10.16)

## 2024-12-11 PROCEDURE — 80053 COMPREHEN METABOLIC PANEL: CPT

## 2024-12-11 PROCEDURE — 83036 HEMOGLOBIN GLYCOSYLATED A1C: CPT

## 2024-12-11 PROCEDURE — 85025 COMPLETE CBC W/AUTO DIFF WBC: CPT

## 2024-12-11 PROCEDURE — 36415 COLL VENOUS BLD VENIPUNCTURE: CPT

## 2024-12-11 PROCEDURE — 80061 LIPID PANEL: CPT

## 2024-12-12 ENCOUNTER — RESULTS FOLLOW-UP (OUTPATIENT)
Dept: FAMILY MEDICINE CLINIC | Facility: CLINIC | Age: 74
End: 2024-12-12

## 2024-12-12 RX ORDER — LANOLIN ALCOHOL/MO/W.PET/CERES
1000 CREAM (GRAM) TOPICAL DAILY
Qty: 30 TABLET | Refills: 10 | Status: SHIPPED | OUTPATIENT
Start: 2024-12-12

## 2025-01-02 DIAGNOSIS — I10 ESSENTIAL HYPERTENSION: ICD-10-CM

## 2025-01-03 ENCOUNTER — TELEPHONE (OUTPATIENT)
Dept: FAMILY MEDICINE CLINIC | Facility: CLINIC | Age: 75
End: 2025-01-03

## 2025-01-03 RX ORDER — LISINOPRIL 10 MG/1
10 TABLET ORAL DAILY
Qty: 30 TABLET | Refills: 7 | Status: SHIPPED | OUTPATIENT
Start: 2025-01-03

## 2025-01-14 DIAGNOSIS — I63.019 CEREBROVASCULAR ACCIDENT (CVA) DUE TO THROMBOSIS OF VERTEBRAL ARTERY, UNSPECIFIED BLOOD VESSEL LATERALITY (HCC): ICD-10-CM

## 2025-01-15 DIAGNOSIS — I63.019 CEREBROVASCULAR ACCIDENT (CVA) DUE TO THROMBOSIS OF VERTEBRAL ARTERY, UNSPECIFIED BLOOD VESSEL LATERALITY (HCC): ICD-10-CM

## 2025-01-15 RX ORDER — ASPIRIN 81 MG/1
81 TABLET, CHEWABLE ORAL DAILY
Qty: 30 TABLET | Refills: 11 | OUTPATIENT
Start: 2025-01-15

## 2025-01-15 RX ORDER — ASPIRIN 81 MG/1
81 TABLET, CHEWABLE ORAL DAILY
Qty: 30 TABLET | Refills: 4 | Status: SHIPPED | OUTPATIENT
Start: 2025-01-15

## 2025-01-30 ENCOUNTER — OFFICE VISIT (OUTPATIENT)
Dept: CARDIOLOGY CLINIC | Facility: CLINIC | Age: 75
End: 2025-01-30
Payer: MEDICARE

## 2025-01-30 VITALS — SYSTOLIC BLOOD PRESSURE: 136 MMHG | DIASTOLIC BLOOD PRESSURE: 72 MMHG | HEART RATE: 103 BPM | OXYGEN SATURATION: 96 %

## 2025-01-30 DIAGNOSIS — R73.03 PREDIABETES: ICD-10-CM

## 2025-01-30 DIAGNOSIS — I10 ESSENTIAL HYPERTENSION: ICD-10-CM

## 2025-01-30 DIAGNOSIS — Z86.73 HISTORY OF STROKE: ICD-10-CM

## 2025-01-30 DIAGNOSIS — I49.1 PAC (PREMATURE ATRIAL CONTRACTION): Primary | ICD-10-CM

## 2025-01-30 DIAGNOSIS — E78.2 MIXED HYPERLIPIDEMIA: ICD-10-CM

## 2025-01-30 PROCEDURE — 99204 OFFICE O/P NEW MOD 45 MIN: CPT | Performed by: INTERNAL MEDICINE

## 2025-01-30 RX ORDER — LISINOPRIL 10 MG/1
10 TABLET ORAL 2 TIMES DAILY
Qty: 30 TABLET | Refills: 3 | Status: SHIPPED | OUTPATIENT
Start: 2025-01-30 | End: 2025-02-14 | Stop reason: SDUPTHER

## 2025-01-30 NOTE — ASSESSMENT & PLAN NOTE
Frequent PACs noted on today's EKG but patient has no symptoms or awareness.  A 24-hour Holter monitor is requested to determine PAC burden given history of CVA.

## 2025-01-30 NOTE — ASSESSMENT & PLAN NOTE
LDL goal less than 55 given prior history of CVA.  Most recent lipid panel on December 11, 2024 showed a target goal. Continue high intensity atorvastatin.

## 2025-01-30 NOTE — ASSESSMENT & PLAN NOTE
BP goal less than 130/80.  Review of the blood pressure recording from nursing home showed not a target goal.  We will increase lisinopril to 10 mg p.o. twice daily.

## 2025-01-30 NOTE — ASSESSMENT & PLAN NOTE
Patient has known history of CVA with MRI showed multiple infarcts in the PCA territory affecting both cerebellar and occipital lobes on November 2013.

## 2025-01-30 NOTE — ASSESSMENT & PLAN NOTE
Latest HbA1c on December 11, 2024 was 6. Therapeutic lifestyle changes with health healthy low salt diet was recommended.

## 2025-01-30 NOTE — PROGRESS NOTES
Cardiology Consultation     Estee Damon  [id number]  1950  [facility]  [address]  [address]  [place] [place] [facility] [id number]    Assessment & Plan  PAC (premature atrial contraction)  Frequent PACs noted on today's EKG but patient has no symptoms or awareness.  A 24-hour Holter monitor is requested to determine PAC burden given history of CVA.  History of stroke  Patient has known history of CVA with MRI showed multiple infarcts in the PCA territory affecting both cerebellar and occipital lobes on November 2013.   Essential hypertension  BP goal less than 130/80.  Review of the blood pressure recording from nursing home showed not a target goal.  We will increase lisinopril to 10 mg p.o. twice daily.  Mixed hyperlipidemia  LDL goal less than 55 given prior history of CVA.  Most recent lipid panel on December 11, 2024 showed a target goal. Continue high intensity atorvastatin.  Prediabetes  Latest HbA1c on December 11, 2024 was 6. Therapeutic lifestyle changes with health healthy low salt diet was recommended.       HPI:    This is a 74-year-old female with known history of CVA with MRI showed multiple infarcts in the PCA territory affecting both cerebellar and occipital lobes on November 2013, history of seizure disorder, hypertension, hyperlipidemia.  ANSELMO on November 2018 showed no evidence of cardioembolic source.  ILR was implanted in January 2019.  Last device check was October of 2021 with no reported episodes.      Patient is referred to cardiology to reestablish care.  At present, patient denies any cardiac complaints.  No chest pain or exertional angina.  Palpitations.  However, EKG during today's office visit showed sinus with frequent PAC.  Patient denies any associated symptoms or awareness.      Patient Active Problem List   Diagnosis    FH: colon cancer    Seizure (HCC)    HLD (hyperlipidemia)    Essential  hypertension    Mild cognitive impairment    Tremor, essential    Vitamin D deficiency    Low serum vitamin B12    History of stroke    Family history of ovarian cancer    Hypokalemia    Numbness and tingling in left hand    Acute pain of left shoulder    Weight loss    Cataract    Osteoarthritis    Fall    Closed head injury    Status post placement of implantable loop recorder    Altered mental status    Medication management     Past Medical History:   Diagnosis Date    Diverticulosis     Hypertension     Morbid obesity (HCC)     Osteoarthritis 2/7/2022    Pancreatic cyst     Seizures (HCC)     last seizure 2013    Stroke (HCC)     Type 2 diabetes mellitus without complication, without long-term current use of insulin (HCC) 12/10/2018    Wears glasses      Social History     Socioeconomic History    Marital status: Single     Spouse name: Not on file    Number of children: Not on file    Years of education: Not on file    Highest education level: Not on file   Occupational History    Not on file   Tobacco Use    Smoking status: Never    Smokeless tobacco: Never   Vaping Use    Vaping status: Never Used   Substance and Sexual Activity    Alcohol use: No    Drug use: No    Sexual activity: Never   Other Topics Concern    Not on file   Social History Narrative    Living in assisted living facility      Social Drivers of Health     Financial Resource Strain: Low Risk  (1/30/2024)    Overall Financial Resource Strain (CARDIA)     Difficulty of Paying Living Expenses: Not hard at all   Food Insecurity: No Food Insecurity (1/30/2024)    Nursing - Inadequate Food Risk Classification     Worried About Running Out of Food in the Last Year: Never true     Ran Out of Food in the Last Year: Never true     Ran Out of Food in the Last Year: Not on file   Transportation Needs: No Transportation Needs (1/30/2024)    PRAPARE - Transportation     Lack of Transportation (Medical): No     Lack of Transportation (Non-Medical): No    Physical Activity: Insufficiently Active (1/30/2024)    Exercise Vital Sign     Days of Exercise per Week: 5 days     Minutes of Exercise per Session: 20 min   Stress: No Stress Concern Present (1/30/2024)    [employer] [place] [employer] - Occupational Stress Questionnaire     Feeling of Stress : Not at all   Social Connections: Socially Isolated (1/30/2024)    Social Connection and Isolation Panel [NHANES]     Frequency of Communication with Friends and Family: Never     Frequency of Social Gatherings with Friends and Family: Never     Attends [employer] Services: 1 to 4 times per year     Active Member of Clubs or Organizations: No     Attends Club or Organization Meetings: Never     Marital Status: Never    Intimate Partner Violence: Not At Risk (1/30/2024)    Humiliation, Afraid, Rape, and Kick questionnaire     Fear of Current or Ex-Partner: No     Emotionally Abused: No     Physically Abused: No     Sexually Abused: No   Housing Stability: Unknown (1/30/2024)    Housing Stability Vital Sign     Unable to Pay for Housing in the Last Year: No     Number of Times Moved in the Last Year: Not on file     Homeless in the Last Year: No      Family History   Problem Relation Age of Onset    Ovarian cancer Mother         Left ovary     Stroke Father     Colon cancer Father     Lung cancer Maternal Grandfather     Prostate cancer Paternal Grandmother     Lung cancer Brother     Other Other         MSH6-related Powers syndrome     Stroke Family     Ovarian cancer Family     Ovarian cancer Family     Breast cancer Family 42     Past Surgical History:   Procedure Laterality Date    APPENDECTOMY      COLONOSCOPY      complete    EYE SURGERY      UT COLONOSCOPY FLX DX W/COLLJ SPEC WHEN PFRMD N/A 03/06/2018    Procedure: COLONOSCOPY with polypectomy;  Surgeon: Jaymie Kruger MD;  Location: [facility];  Service: General       Current Outpatient Medications:     aspirin 81 mg chewable tablet, CHEW 1 TABLET  BY MOUTH EVERY DAY, Disp: 30 tablet, Rfl: 4    atorvastatin (LIPITOR) 80 mg tablet, Take 1 tablet (80 mg total) by mouth daily, Disp: 90 tablet, Rfl: 0    clopidogrel (PLAVIX) 75 mg tablet, Take 1 tablet (75 mg total) by mouth daily, Disp: 30 tablet, Rfl: 2    D3-1000 25 MCG (1000 UT) tablet, TAKE 1 TABLET (1,000 UNITS TOTAL) BY MOUTH DAILY, Disp: 30 tablet, Rfl: 5    Diclofenac Sodium (VOLTAREN) 1 %, APPLY TOPICALLY TO PAINFUL AREA(S) ON SKIN TWO TIMES PER DAY AS NEEDED, Disp: 100 g, Rfl: 4    donepezil (ARICEPT) 10 mg tablet, TAKE ONE TABLET BY MOUTH DAILY, Disp: 30 tablet, Rfl: 4    levETIRAcetam (KEPPRA) 750 mg tablet, TAKE 1 TABLET (750 MG TOTAL) BY MOUTH IN THE MORNING AND 1 TABLET (750 MG TOTAL) IN THE EVENING., Disp: 60 tablet, Rfl: 10    lisinopril (ZESTRIL) 10 mg tablet, Take 1 tablet (10 mg total) by mouth 2 (two) times a day, Disp: 30 tablet, Rfl: 3    vitamin B-12 (VITAMIN B-12) 1,000 mcg tablet, TAKE ONE TABLET BY MOUTH EVERY DAY, Disp: 30 tablet, Rfl: 10    Blood Pressure Monitor MISC, by Does not apply route daily (Patient not taking: Reported on 1/30/2025), Disp: 1 each, Rfl: 0    Blood Pressure Monitoring (BLOOD PRESSURE CUFF) MISC, by Does not apply route daily (Patient not taking: Reported on 11/26/2024), Disp: 1 each, Rfl: 0    COVID-19 At Home Test (FLOWFLEX COVID-19 AG HOME TEST VI), , Disp: , Rfl:     cyclobenzaprine (FLEXERIL) 5 mg tablet, Take 1 tablet (5 mg total) by mouth daily at bedtime (Patient not taking: Reported on 6/1/2022), Disp: 30 tablet, Rfl: 5    Elastic Bandages & Supports (WRIST SPLINT/COCK-UP/LEFT L) MISC, by Does not apply route daily (Patient not taking: Reported on 11/26/2024), Disp: 1 each, Rfl: 0  No Known Allergies  Vitals:    01/30/25 1308   BP: 136/72   BP Location: Left arm   Patient Position: Sitting   Cuff Size: Standard   Pulse: 103   SpO2: 96%       Labs:  Lab Results   Component Value Date    K 4.2 12/11/2024    K 3.5 12/04/2018    K 4.0 11/17/2016    CO2 28  12/11/2024    CO2 34 (H) 02/04/2022    CO2 28 12/04/2018    CO2 29 11/17/2016    BUN 12 12/11/2024    BUN 15 12/04/2018    BUN 16 11/14/2018    CREATININE 0.86 12/11/2024    CREATININE 1.00 12/04/2018    CREATININE 0.98 11/14/2018    GLUCOSE 109 02/04/2022    CALCIUM 9.0 12/11/2024    CALCIUM 9.3 12/04/2018    CALCIUM 8.8 11/17/2016     Lab Results   Component Value Date    TRIG 92 12/11/2024    HDL 51 12/11/2024     Imaging: No results found.    My personal review of EKG during today's office visit showed sinus with frequent PAC, NSST-T abnormalities. Compared to previous, PAC are now present.     Review of Systems:  Review of Systems   All other systems reviewed and are negative.      Physical Exam:  Vitals and nursing note reviewed.   Constitutional:       General: not in acute distress.     Appearance: well-developed.   HENT:      Head: Normocephalic and atraumatic.      Eyes: Pink conjunctivae, anicteric sclerae.   Neck:     Supple, no JVD, no carotid bruit.  Cardiovascular:      Rate and Rhythm: Normal rate. Rhythm irregular from PAC     Heart sounds: No murmur. Normal S1S2, No gallops. No rubs.      No pedal edema.   Pulmonary:      Effort: Pulmonary effort is normal. No respiratory distress.      Breath sounds: Normal breath sounds.   Abdominal:      Palpations: Abdomen is soft, no distention.     Tenderness: There is no abdominal tenderness.   Musculoskeletal:         General: No swelling.      Cervical back: Neck supple.   Skin:     General: Skin is warm and dry.    Neurological:      Mental Status: awake, alert, oriented x 3.

## 2025-01-30 NOTE — PATIENT INSTRUCTIONS
Schedule to have echo  Schedule to have 24 hour Holter heart monitor  Increase Lisinopril to 10 mg by mouth twice daily.

## 2025-02-03 PROCEDURE — 93000 ELECTROCARDIOGRAM COMPLETE: CPT | Performed by: INTERNAL MEDICINE

## 2025-02-12 ENCOUNTER — HOSPITAL ENCOUNTER (OUTPATIENT)
Dept: NON INVASIVE DIAGNOSTICS | Facility: CLINIC | Age: 75
Discharge: HOME | End: 2025-02-12
Payer: MEDICARE

## 2025-02-12 PROCEDURE — 93225 XTRNL ECG REC<48 HRS REC: CPT

## 2025-02-12 PROCEDURE — 93226 XTRNL ECG REC<48 HR SCAN A/R: CPT

## 2025-02-14 ENCOUNTER — RESULTS FOLLOW-UP (OUTPATIENT)
Dept: CARDIOLOGY CLINIC | Facility: CLINIC | Age: 75
End: 2025-02-14

## 2025-02-14 DIAGNOSIS — I10 ESSENTIAL HYPERTENSION: ICD-10-CM

## 2025-02-14 DIAGNOSIS — I49.1 PAC (PREMATURE ATRIAL CONTRACTION): Primary | ICD-10-CM

## 2025-02-14 PROCEDURE — 93227 XTRNL ECG REC<48 HR R&I: CPT | Performed by: INTERNAL MEDICINE

## 2025-02-14 RX ORDER — LISINOPRIL 10 MG/1
10 TABLET ORAL DAILY
Qty: 30 TABLET | Refills: 3 | Status: SHIPPED | OUTPATIENT
Start: 2025-02-14 | End: 2025-02-21 | Stop reason: SDUPTHER

## 2025-02-14 RX ORDER — METOPROLOL SUCCINATE 25 MG/1
25 TABLET, EXTENDED RELEASE ORAL
Qty: 30 TABLET | Refills: 3 | Status: SHIPPED | OUTPATIENT
Start: 2025-02-14 | End: 2025-02-21 | Stop reason: SDUPTHER

## 2025-02-17 DIAGNOSIS — I63.9 CEREBROVASCULAR ACCIDENT (CVA), UNSPECIFIED MECHANISM (HCC): ICD-10-CM

## 2025-02-17 DIAGNOSIS — E78.49 OTHER HYPERLIPIDEMIA: ICD-10-CM

## 2025-02-17 RX ORDER — CLOPIDOGREL BISULFATE 75 MG/1
75 TABLET ORAL DAILY
Qty: 30 TABLET | Refills: 0 | Status: SHIPPED | OUTPATIENT
Start: 2025-02-17

## 2025-02-17 RX ORDER — ATORVASTATIN CALCIUM 80 MG/1
80 TABLET, FILM COATED ORAL DAILY
Qty: 90 TABLET | Refills: 3 | Status: SHIPPED | OUTPATIENT
Start: 2025-02-17 | End: 2025-05-18

## 2025-02-19 NOTE — TELEPHONE ENCOUNTER
----- Message from Phil Mixon MD sent at 2/14/2025  9:01 PM EST -----  Please inform Holter monitor showed normal sinus rhythm with frequent PVC at 5.9%. I recommend starting B blocker using Metoprolol succinate 25 mg po once daily at bedtime and to reduce Lisinopril to 10 mg po once daily to allow use of Metoprolol.  Thank you. .

## 2025-02-19 NOTE — TELEPHONE ENCOUNTER
Spoke with Patient daughter an she said patient is in a living  facility  to call this number an speak with nurse there for med changes     Called number no answer [phone]

## 2025-02-20 ENCOUNTER — CONSULT (OUTPATIENT)
Dept: NEUROLOGY | Facility: CLINIC | Age: 75
End: 2025-02-20
Payer: MEDICARE

## 2025-02-20 VITALS
SYSTOLIC BLOOD PRESSURE: 130 MMHG | TEMPERATURE: 97.4 F | WEIGHT: 168.6 LBS | BODY MASS INDEX: 27.21 KG/M2 | OXYGEN SATURATION: 97 % | HEART RATE: 74 BPM | DIASTOLIC BLOOD PRESSURE: 84 MMHG

## 2025-02-20 DIAGNOSIS — Z86.73 HISTORY OF STROKE: ICD-10-CM

## 2025-02-20 DIAGNOSIS — F01.50 VASCULAR DEMENTIA (HCC): Primary | ICD-10-CM

## 2025-02-20 DIAGNOSIS — G11.9 CEREBELLAR ATAXIA (HCC): ICD-10-CM

## 2025-02-20 DIAGNOSIS — G31.84 MILD COGNITIVE IMPAIRMENT: ICD-10-CM

## 2025-02-20 DIAGNOSIS — G25.0 TREMOR, ESSENTIAL: ICD-10-CM

## 2025-02-20 DIAGNOSIS — R56.9 SEIZURE (HCC): ICD-10-CM

## 2025-02-20 PROCEDURE — G2211 COMPLEX E/M VISIT ADD ON: HCPCS | Performed by: PSYCHIATRY & NEUROLOGY

## 2025-02-20 PROCEDURE — 99205 OFFICE O/P NEW HI 60 MIN: CPT | Performed by: PSYCHIATRY & NEUROLOGY

## 2025-02-20 NOTE — ASSESSMENT & PLAN NOTE
MRI in 2013 with multiple discontiguous and confluent foci of acute ischemia in the posterior circulation affecting the supra and infratentorial parenchyma.

## 2025-02-20 NOTE — PROGRESS NOTES
Name: Estee Damon      : 1950      MRN: [id number]  Encounter Provider: Yane Ramírez MD  Encounter Date: 2025   Encounter department: [facility] [place]  :  Assessment & Plan  Vascular dementia (HCC)  Patient with cognitive impairment maintained on donepezil. There is extensive white matter change on previous MRI in 2018.     Likely Vascular dementia. Will repeat MRI NeuroQuant to evaluate changes in hippocampal volume and new areas of stroke.   Orders:    MRI brain NeuroQuant wo contrast; Future    History of stroke  MRI in 2018 showed 2 tiny foci of recent ischemia, one each in the right frontal and left parieto-occipital region. Patient on  aspirin, Plavix and atorvastatin. They deny any new stroke like symptom. She has a loop recorder in place which is not working. She follows with cardiology. Holter monitor recently done. Echocardiogram scheduled for later this week. Recent LDL 33.     Plan:  Will repeat MRI brain to evaluate for new areas of stroke  Will focus on stroke risk prevention  Antiplatelet therapy: aspirin 81 mg and plavix 75 mg   Goal LDL < 70    Continue atorvastatin 80 mg, would consider decreasing to 40 mg   Monitor blood pressure with goal <130/80  HbA1c 6.0, goal euglycemia  Counseled on lifestyle measures to reduce stroke burden if applicable, such as:  Smoking cessation   Reducing alcohol intake  Encouraging physical activity  Encouraging weight loss  A low-carbohydrate diet reducing the intake of foods rich in carbohydrates, such as bread, pasta, rice, and sugary snacks.Emphasizes foods that are high in protein, healthy fats, and non-starchy vegetables  Regular follow-up with their PCP  If they have any stroke-like symptoms including sudden painless loss of vision or double vision, difficulty speaking or swallowing, vertigo/room spinning that does not quickly resolve, or weakness/numbness affecting 1 side of the face or body he should proceed by  "ambulance to the nearest emergency room immediately.  Follow-up in 4 months    Orders:    Ambulatory Referral to Neurology    MRI brain NeuroQuant wo contrast; Future    Cerebellar ataxia (HCC)  MRI in 2013 with multiple discontiguous and confluent foci of acute ischemia in the posterior circulation affecting the supra and infratentorial parenchyma.        Seizure (HCC)  As per chart review, patient with a history of epilepsy which was well controlled for about 30 years. In 2013 she had recurrent seizures and she began Keppra at that time. Last seizure around 2013. Patient with history of seizures maintained on Keppra 750 mg twice daily. She denies any interval seizures since she was last seen by neurology.     Patient today is stable. Continue Keppra 750 mg bid.        Tremor, essential  Patient with many years of \"no, no\" head tremor.        Mild cognitive impairment    Orders:    Ambulatory Referral to Neurology          History of Present Illness   HPI   Patient is a 74-year-old female with history of hypertension, hyperlipidemia, B12 deficiency, osteoarthritis, premature atrial contractions and seizures who presents to re establish care with neurology. Patient was last seen by our team in 2019.    Stroke:   MRI in 2018 showed 2 tiny foci of recent ischemia, one each in the right frontal and left parieto-occipital region. Patient on  aspirin, Plavix and atorvastatin. They deny any new stroke like symptom. She has a loop recorder in place which is not working. She follows with cardiology. Holter monitor recently done. Echocardiogram scheduled for later this week.     Seizures:  As per chart review, patient with a history of epilepsy which was well controlled for about 30 years. In 2013 she had recurrent seizures and she began Keppra at that time. Last seizure around 2013. Patient with history of seizures maintained on Keppra 750 mg twice daily. She denies any interval seizures since she was last seen by " "neurology.     Cognitive impairment:  Patient with cognitive impairment maintained on donepezil. There is extensive white matter change on previous MRI in 2018.     Essential tremor:  Patient with a longstanding history of a \"no, no\" head tremor.     Patient was a  for 36 years.     Workup:  Holter monitor (02/12/2025):  1. A 24 hour holter monitor demonstrated normal sinus rhythm with an average rate of 67 BPM; a minimum rate of 46 BPM; and a maximum rate of 118 BPM.  2. There were 5702 (5.9%) ventricular ectopic beats, the majority of which were premature ventricular contractions.  There were 16 ventricular couplets, but no runs of non-sustained ventricular tachycardia.  3. There were 765 supraventricular ectopic beats, the majority of which were premature atrial contractions.  There were 36 atrial couplets, 2 atrial triplets, but no runs of supraventricular tachycardia.  4. The longest R-R interval was 1.7 seconds.  5. The patient did not return a diary with holter monitor.    MRI BRAIN NEUROQUANT WO AND W CONTRAST (Final) 5/10/2018 12:02 PM    Impression  1.  There may be 2 tiny foci of recent ischemia, one each in the right frontal and left parieto-occipital region.  Diffuse generalized volume loss, advanced chronic large and small vessel ischemia.  2.  NeuroQuant analysis was performed: There does appear to be selective volume loss of the hippocampus and prominence of the temporal horn 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.  This occurs in the face of diffuse chronic  large and small vessel ischemia.               Review of Systems   Constitutional:  Negative for appetite change, fatigue and fever.   HENT: Negative.  Negative for hearing loss, tinnitus, trouble swallowing and voice change.    Eyes: Negative.  Negative for photophobia, pain and visual disturbance.   Respiratory: Negative.  Negative for shortness of breath.    Cardiovascular: Negative.  Negative for " palpitations.   Gastrointestinal: Negative.  Negative for nausea and vomiting.   Endocrine: Negative.  Negative for cold intolerance.   Genitourinary: Negative.  Negative for dysuria, frequency and urgency.   Musculoskeletal:  Negative for back pain, gait problem, myalgias, neck pain and neck stiffness.   Skin: Negative.  Negative for rash.   Allergic/Immunologic: Negative.    Neurological:  Negative for dizziness, tremors, seizures, syncope, facial asymmetry, speech difficulty, weakness, light-headedness, numbness (fingers) and headaches.        Pt complains of numbness in fingers and has issues with balance.    Hematological: Negative.  Does not bruise/bleed easily.   Psychiatric/Behavioral: Negative.  Negative for confusion, hallucinations and sleep disturbance.    All other systems reviewed and are negative.   I have personally reviewed the MA's review of systems and made changes as necessary.         Objective   /84 (BP Location: Right arm, Patient Position: Sitting, Cuff Size: Standard)   Pulse 74   Temp (!) 97.4 °F (36.3 °C) (Temporal)   Wt 76.5 kg (168 lb 9.6 oz)   SpO2 97%   BMI 27.21 kg/m²     Physical Exam  Constitutional:       General: She is awake.   Eyes:      Extraocular Movements: Extraocular movements intact.   Neurological:      Mental Status: She is alert.      Motor: Motor strength is normal.     Deep Tendon Reflexes:      Reflex Scores:       Bicep reflexes are 2+ on the right side and 2+ on the left side.       Brachioradialis reflexes are 2+ on the right side and 2+ on the left side.      Neurological Exam  Mental Status  Awake and alert.  Unable to tell me today's date or month .    Cranial Nerves  CN III, IV, VI: Extraocular movements intact bilaterally.  CN VII: Full and symmetric facial movement.  CN VIII: Hearing is normal.  Difficulty with peripheral vision on the right temporal visual field .    Motor   The following abnormal movements were seen: Strength is 5/5 throughout  "all four extremities.  \"No, No\" head tremor .    Reflexes                                            Right                      Left  Brachioradialis                    2+                         2+  Biceps                                 2+                         2+  Patellar                                Tr                         Tr    Coordination  Right: Finger-to-nose normal.Left: Finger-to-nose normal.    Gait  Casual gait:  Mildly ataxic gait. Walks with assistance of a walker .            "

## 2025-02-20 NOTE — ASSESSMENT & PLAN NOTE
As per chart review, patient with a history of epilepsy which was well controlled for about 30 years. In 2013 she had recurrent seizures and she began Keppra at that time. Last seizure around 2013. Patient with history of seizures maintained on Keppra 750 mg twice daily. She denies any interval seizures since she was last seen by neurology.     Patient today is stable. Continue Keppra 750 mg bid.

## 2025-02-20 NOTE — ASSESSMENT & PLAN NOTE
MRI in 2018 showed 2 tiny foci of recent ischemia, one each in the right frontal and left parieto-occipital region. Patient on  aspirin, Plavix and atorvastatin. They deny any new stroke like symptom. She has a loop recorder in place which is not working. She follows with cardiology. Holter monitor recently done. Echocardiogram scheduled for later this week. Recent LDL 33.     Plan:  Will repeat MRI brain to evaluate for new areas of stroke  Will focus on stroke risk prevention  Antiplatelet therapy: aspirin 81 mg and plavix 75 mg   Goal LDL < 70    Continue atorvastatin 80 mg, would consider decreasing to 40 mg   Monitor blood pressure with goal <130/80  HbA1c 6.0, goal euglycemia  Counseled on lifestyle measures to reduce stroke burden if applicable, such as:  Smoking cessation   Reducing alcohol intake  Encouraging physical activity  Encouraging weight loss  A low-carbohydrate diet reducing the intake of foods rich in carbohydrates, such as bread, pasta, rice, and sugary snacks.Emphasizes foods that are high in protein, healthy fats, and non-starchy vegetables  Regular follow-up with their PCP  If they have any stroke-like symptoms including sudden painless loss of vision or double vision, difficulty speaking or swallowing, vertigo/room spinning that does not quickly resolve, or weakness/numbness affecting 1 side of the face or body he should proceed by ambulance to the nearest emergency room immediately.  Follow-up in 4 months    Orders:    Ambulatory Referral to Neurology    MRI brain NeuroQuant wo contrast; Future

## 2025-02-21 ENCOUNTER — HOSPITAL ENCOUNTER (OUTPATIENT)
Dept: NON INVASIVE DIAGNOSTICS | Facility: CLINIC | Age: 75
Discharge: HOME | End: 2025-02-21
Payer: MEDICARE

## 2025-02-21 VITALS
HEIGHT: 66 IN | DIASTOLIC BLOOD PRESSURE: 84 MMHG | WEIGHT: 168 LBS | SYSTOLIC BLOOD PRESSURE: 130 MMHG | HEART RATE: 74 BPM | BODY MASS INDEX: 27 KG/M2

## 2025-02-21 DIAGNOSIS — I49.1 PAC (PREMATURE ATRIAL CONTRACTION): ICD-10-CM

## 2025-02-21 DIAGNOSIS — I10 ESSENTIAL HYPERTENSION: ICD-10-CM

## 2025-02-21 DIAGNOSIS — Z86.73 HISTORY OF STROKE: ICD-10-CM

## 2025-02-21 LAB
AORTIC ROOT: 3.4 CM
ASCENDING AORTA: 3.3 CM
BSA FOR ECHO PROCEDURE: 1.86 M2
E WAVE DECELERATION TIME: 325 MS
E/A RATIO: 0.69
FRACTIONAL SHORTENING: 32 (ref 28–44)
INTERVENTRICULAR SEPTUM IN DIASTOLE (PARASTERNAL SHORT AXIS VIEW): 1.4 CM
INTERVENTRICULAR SEPTUM: 1.4 CM (ref 0.6–1.1)
LAAS-AP2: 18.1 CM2
LAAS-AP4: 17 CM2
LEFT ATRIUM SIZE: 2.7 CM
LEFT ATRIUM VOLUME (MOD BIPLANE): 48 ML
LEFT ATRIUM VOLUME INDEX (MOD BIPLANE): 25.8 ML/M2
LEFT INTERNAL DIMENSION IN SYSTOLE: 3 CM (ref 2.1–4)
LEFT VENTRICULAR INTERNAL DIMENSION IN DIASTOLE: 4.4 CM (ref 3.5–6)
LEFT VENTRICULAR POSTERIOR WALL IN END DIASTOLE: 1.1 CM
LEFT VENTRICULAR STROKE VOLUME: 52 ML
LV EF US.2D.A4C+ESTIMATED: 59 %
LVSV (TEICH): 52 ML
MV E'TISSUE VEL-SEP: 5 CM/S
MV PEAK A VEL: 0.75 M/S
MV PEAK E VEL: 52 CM/S
MV STENOSIS PRESSURE HALF TIME: 94 MS
MV VALVE AREA P 1/2 METHOD: 2.34
RIGHT ATRIUM AREA SYSTOLE A4C: 11.7 CM2
RIGHT VENTRICLE ID DIMENSION: 3.1 CM
SL CV LEFT ATRIUM LENGTH A2C: 5 CM
SL CV LV EF: 60
SL CV PED ECHO LEFT VENTRICLE DIASTOLIC VOLUME (MOD BIPLANE) 2D: 89 ML
SL CV PED ECHO LEFT VENTRICLE SYSTOLIC VOLUME (MOD BIPLANE) 2D: 36 ML
TR MAX PG: 23 MMHG
TR PEAK VELOCITY: 2.4 M/S
TRICUSPID ANNULAR PLANE SYSTOLIC EXCURSION: 2 CM
TRICUSPID VALVE PEAK REGURGITATION VELOCITY: 2.37 M/S

## 2025-02-21 PROCEDURE — 93306 TTE W/DOPPLER COMPLETE: CPT | Performed by: INTERNAL MEDICINE

## 2025-02-21 PROCEDURE — 93306 TTE W/DOPPLER COMPLETE: CPT

## 2025-02-21 RX ORDER — METOPROLOL SUCCINATE 25 MG/1
25 TABLET, EXTENDED RELEASE ORAL
Qty: 30 TABLET | Refills: 3 | Status: SHIPPED | OUTPATIENT
Start: 2025-02-21 | End: 2025-02-21 | Stop reason: SDUPTHER

## 2025-02-21 RX ORDER — LISINOPRIL 10 MG/1
10 TABLET ORAL DAILY
Qty: 30 TABLET | Refills: 3 | Status: SHIPPED | OUTPATIENT
Start: 2025-02-21 | End: 2025-02-21 | Stop reason: SDUPTHER

## 2025-02-21 NOTE — TELEPHONE ENCOUNTER
Spoke with Darby from [facility] who requested that the new med changes be sent to them @ [facility]. Metoprolol succinate 25 mg po once daily at bedtime and to reduce Lisinopril to 10 mg po once daily to allow use of Metoprolol. Those new orders were faxed to # [phone]. She also requested that the orders be faxed to the pharmacy, [facility]. New order requests put through.

## 2025-02-21 NOTE — TELEPHONE ENCOUNTER
Prescription for Metoprolol succinate 25 mg po daily at bedtime and lisinopril 10 mg po daily were sent to [facility].

## 2025-02-21 NOTE — TELEPHONE ENCOUNTER
Need to be sent to [facility]       Medication: Lisinopril 10mg tablet    Dose/Frequency: 1 tablet daily     Quantity: 30    Pharmacy: [facility]     Office:   [] PCP/Provider -   [x] Speciality/Provider -     Does the patient have enough for 3 days?   [] Yes   [x] No - Send as HP to POD       Medication: Metoprolol Succinate 25mg tablet     Dose/Frequency: 1 tablet @ bedtime     Quantity: 30    Pharmacy: [facility]     Office:   [] PCP/Provider -   [x] Speciality/Provider -     Does the patient have enough for 3 days?   [] Yes   [x] No - Send as HP to POD

## 2025-02-25 ENCOUNTER — OFFICE VISIT (OUTPATIENT)
Dept: FAMILY MEDICINE CLINIC | Facility: CLINIC | Age: 75
End: 2025-02-25

## 2025-02-25 VITALS
HEART RATE: 68 BPM | DIASTOLIC BLOOD PRESSURE: 72 MMHG | OXYGEN SATURATION: 96 % | SYSTOLIC BLOOD PRESSURE: 132 MMHG | TEMPERATURE: 98.1 F

## 2025-02-25 DIAGNOSIS — R73.03 PRE-DIABETES: ICD-10-CM

## 2025-02-25 DIAGNOSIS — I10 ESSENTIAL HYPERTENSION: Primary | ICD-10-CM

## 2025-02-25 DIAGNOSIS — Z86.73 HISTORY OF STROKE: ICD-10-CM

## 2025-02-25 PROCEDURE — 99213 OFFICE O/P EST LOW 20 MIN: CPT | Performed by: FAMILY MEDICINE

## 2025-02-25 PROCEDURE — G2211 COMPLEX E/M VISIT ADD ON: HCPCS | Performed by: FAMILY MEDICINE

## 2025-02-25 NOTE — ASSESSMENT & PLAN NOTE
Blood pressure uncontrolled in office today, with readings of 135/60 and 132/72 today. Goal <130/80  Per medication list from [facility], patient has not yet started Metoprolol 25mg daily as prescribed by Cardiology. Anticipate better BP control once patient is taking this daily.   Patient currently taking lisinopril 10 mg daily per medication list from [facility]  Patient denies visual changes, chest pain, palpitations, shortness of breath, headache, or difficulty urinating.    Plan  Continue Lisinopril 10 mg daily.  Starts Metoprolol 25 mg nightly at bedtime   Complete daily home blood pressure checks and bring blood pressure log to next visit.  Patient following with Cardiology, appreciate recommendations  Follow up in office in 1 month for Medicare Annual Wellness

## 2025-02-25 NOTE — PROGRESS NOTES
Name: Estee Damon      : 1950      MRN: [id number]  Encounter Provider: Domenica Matos MD  Encounter Date: 2025   Encounter department: [facility] [place]  :  Assessment & Plan  Essential hypertension  Blood pressure uncontrolled in office today, with readings of 135/60 and 132/72 today. Goal <130/80  Per medication list from [facility], patient has not yet started Metoprolol 25mg daily as prescribed by Cardiology. Anticipate better BP control once patient is taking this daily.   Patient currently taking lisinopril 10 mg daily per medication list from [facility]  Patient denies visual changes, chest pain, palpitations, shortness of breath, headache, or difficulty urinating.    Plan  Continue Lisinopril 10 mg daily.  Starts Metoprolol 25 mg nightly at bedtime   Complete daily home blood pressure checks and bring blood pressure log to next visit.  Patient following with Cardiology, appreciate recommendations  Follow up in office in 1 month for Medicare Annual Wellness        History of stroke  Patient with history of CVA.  2013 MRI with multiple infarcts in PCA territory affecting cerebellar and occipital lobes.  2018 MRI NueroQuant with tiny foci of recent ischemia in right frontal lobe (1) and left parieto-occipital region (1).  24 lipid panel within normal limits with LDL of 33. Goal LDL < 70.  25 24 hour Holter monitor with frequent premature ventricular contractions (5.9%) with no non-sustained ventricular tachycardia, and occasional premature atrial contractions with no supraventricular tachycardia.  25 Echo with EF 60%.  Patient currently taking atorvastatin 80 mg. Per cardiology, continue 80 mg dose. Per neurology, may consider decreasing to atorvastatin 40 mg    Plan  Continue atorvastatin 80 mg at this time  Continue aspirin 81 mg and plavix 75 mg daily  MRI NeuroQuant scheduled for 3/20/25.  Patient following with Neurology,  appreciate recommendations  Follow up in 1 month at [facility].       Pre-diabetes  12/11/24 HbA1c 6%, fasting glucose 114. Goal is euglycemia.  She states she frequently drinks Cranberry juice throughout the day    Plan  Discussed lifestyle modifications including decreasing carbohydrate intake and limiting sugary beverages to 1 serving per day.  Recommend limiting cranberry juice to 8 oz daily at most              History of Present Illness   Estee is a 74 year old female presenting for a follow up to discuss recent appointments with Cardiology and Neurology, lab work and imaging. Patient is feeling well overall. She currently resides at  [facility], [place]. Patient's list from [facility] [place] does not reflect recent medication changes from Cardiology appointment nor her Vitamin D nor Vitamin B12 regimens. She presents with her brother who states he is unsure why patient has not yet started Metoprolol 25mg nightly. Patient states she is feeling well today and has no concerns. She admits to a diet high in carbs and sugars.       Review of Systems   Constitutional:  Negative for activity change and appetite change.   Respiratory:  Negative for shortness of breath.    Cardiovascular:  Negative for chest pain.   Gastrointestinal:  Negative for nausea and vomiting.   Genitourinary:  Negative for difficulty urinating.   Neurological:  Negative for dizziness and headaches.       Objective   /72 (BP Location: Right arm, Patient Position: Sitting)   Pulse 68   Temp 98.1 °F (36.7 °C) (Temporal)   SpO2 96%      Physical Exam  HENT:      Head: Normocephalic and atraumatic.      Nose: Nose normal.      Mouth/Throat:      Mouth: Mucous membranes are moist.      Pharynx: Oropharynx is clear.   Eyes:      Conjunctiva/sclera: Conjunctivae normal.   Cardiovascular:      Rate and Rhythm: Normal rate and regular rhythm.   Pulmonary:      Effort: Pulmonary effort is normal. No respiratory distress.      Breath sounds: Normal  breath sounds. No wheezing.   Abdominal:      General: Bowel sounds are normal.      Palpations: Abdomen is soft.      Tenderness: There is no abdominal tenderness.   Skin:     General: Skin is warm and dry.   Neurological:      Mental Status: She is alert and oriented to person, place, and time.   Psychiatric:         Mood and Affect: Mood normal.         Behavior: Behavior normal.             Domenica Matos MD   PGY-2, Family Medicine  [facility]

## 2025-02-25 NOTE — ASSESSMENT & PLAN NOTE
Patient with history of CVA.  11/2013 MRI with multiple infarcts in PCA territory affecting cerebellar and occipital lobes.  5/2018 MRI NueroQuant with tiny foci of recent ischemia in right frontal lobe (1) and left parieto-occipital region (1).  12/11/24 lipid panel within normal limits with LDL of 33. Goal LDL < 70.  2/12/25 24 hour Holter monitor with frequent premature ventricular contractions (5.9%) with no non-sustained ventricular tachycardia, and occasional premature atrial contractions with no supraventricular tachycardia.  2/21/25 Echo with EF 60%.  Patient currently taking atorvastatin 80 mg. Per cardiology, continue 80 mg dose. Per neurology, may consider decreasing to atorvastatin 40 mg    Plan  Continue atorvastatin 80 mg at this time  Continue aspirin 81 mg and plavix 75 mg daily  MRI NeuroQuant scheduled for 3/20/25.  Patient following with Neurology, appreciate recommendations  Follow up in 1 month at [facility].

## 2025-03-20 ENCOUNTER — HOSPITAL ENCOUNTER (OUTPATIENT)
Dept: MRI IMAGING | Facility: HOSPITAL | Age: 75
Discharge: HOME | End: 2025-03-20
Attending: PSYCHIATRY & NEUROLOGY
Payer: MEDICARE

## 2025-03-20 DIAGNOSIS — Z86.73 HISTORY OF STROKE: ICD-10-CM

## 2025-03-20 DIAGNOSIS — F01.50 VASCULAR DEMENTIA (HCC): ICD-10-CM

## 2025-03-20 PROCEDURE — 70551 MRI BRAIN STEM W/O DYE: CPT

## 2025-03-23 ENCOUNTER — APPOINTMENT (EMERGENCY)
Dept: RADIOLOGY | Facility: HOSPITAL | Age: 75
End: 2025-03-23
Payer: MEDICARE

## 2025-03-23 ENCOUNTER — HOSPITAL ENCOUNTER (EMERGENCY)
Facility: HOSPITAL | Age: 75
Discharge: HOME | End: 2025-03-23
Attending: EMERGENCY MEDICINE
Payer: MEDICARE

## 2025-03-23 VITALS
SYSTOLIC BLOOD PRESSURE: 200 MMHG | HEART RATE: 54 BPM | OXYGEN SATURATION: 94 % | DIASTOLIC BLOOD PRESSURE: 100 MMHG | RESPIRATION RATE: 20 BRPM | TEMPERATURE: 98.9 F

## 2025-03-23 DIAGNOSIS — R93.0 ABNORMAL MRI OF HEAD: Primary | ICD-10-CM

## 2025-03-23 LAB
ANION GAP SERPL CALCULATED.3IONS-SCNC: 7 MMOL/L (ref 4–13)
ATRIAL RATE: 116 BPM
ATRIAL RATE: 116 BPM
BASOPHILS # BLD AUTO: 0.06 THOUSANDS/ÂΜL (ref 0–0.1)
BASOPHILS NFR BLD AUTO: 1 % (ref 0–1)
BUN SERPL-MCNC: 12 MG/DL (ref 5–25)
CALCIUM SERPL-MCNC: 8.9 MG/DL (ref 8.4–10.2)
CHLORIDE SERPL-SCNC: 105 MMOL/L (ref 96–108)
CO2 SERPL-SCNC: 28 MMOL/L (ref 21–32)
CREAT SERPL-MCNC: 0.88 MG/DL (ref 0.6–1.3)
EOSINOPHIL # BLD AUTO: 0.11 THOUSAND/ÂΜL (ref 0–0.61)
EOSINOPHIL NFR BLD AUTO: 2 % (ref 0–6)
ERYTHROCYTE [DISTWIDTH] IN BLOOD BY AUTOMATED COUNT: 13.5 % (ref 11.6–15.1)
GFR SERPL CREATININE-BSD FRML MDRD: 64 ML/MIN/1.73SQ M
GLUCOSE SERPL-MCNC: 93 MG/DL (ref 65–140)
HCT VFR BLD AUTO: 44.4 % (ref 34.8–46.1)
HGB BLD-MCNC: 14.3 G/DL (ref 11.5–15.4)
IMM GRANULOCYTES # BLD AUTO: 0.03 THOUSAND/UL (ref 0–0.2)
IMM GRANULOCYTES NFR BLD AUTO: 0 % (ref 0–2)
LYMPHOCYTES # BLD AUTO: 1.31 THOUSANDS/ÂΜL (ref 0.6–4.47)
LYMPHOCYTES NFR BLD AUTO: 19 % (ref 14–44)
MCH RBC QN AUTO: 30.3 PG (ref 26.8–34.3)
MCHC RBC AUTO-ENTMCNC: 32.2 G/DL (ref 31.4–37.4)
MCV RBC AUTO: 94 FL (ref 82–98)
MONOCYTES # BLD AUTO: 0.55 THOUSAND/ÂΜL (ref 0.17–1.22)
MONOCYTES NFR BLD AUTO: 8 % (ref 4–12)
NEUTROPHILS # BLD AUTO: 4.71 THOUSANDS/ÂΜL (ref 1.85–7.62)
NEUTS SEG NFR BLD AUTO: 70 % (ref 43–75)
NRBC BLD AUTO-RTO: 0 /100 WBCS
P AXIS: 27 DEGREES
P AXIS: 31 DEGREES
PLATELET # BLD AUTO: 217 THOUSANDS/UL (ref 149–390)
PMV BLD AUTO: 11 FL (ref 8.9–12.7)
POTASSIUM SERPL-SCNC: 3.7 MMOL/L (ref 3.5–5.3)
PR INTERVAL: 154 MS
PR INTERVAL: 154 MS
QRS AXIS: 2 DEGREES
QRS AXIS: 2 DEGREES
QRSD INTERVAL: 78 MS
QRSD INTERVAL: 78 MS
QT INTERVAL: 472 MS
QT INTERVAL: 472 MS
QTC INTERVAL: 463 MS
QTC INTERVAL: 463 MS
RBC # BLD AUTO: 4.72 MILLION/UL (ref 3.81–5.12)
SODIUM SERPL-SCNC: 140 MMOL/L (ref 135–147)
T WAVE AXIS: 12 DEGREES
T WAVE AXIS: 18 DEGREES
VENTRICULAR RATE: 58 BPM
VENTRICULAR RATE: 58 BPM
WBC # BLD AUTO: 6.77 THOUSAND/UL (ref 4.31–10.16)

## 2025-03-23 PROCEDURE — 85025 COMPLETE CBC W/AUTO DIFF WBC: CPT

## 2025-03-23 PROCEDURE — 93010 ELECTROCARDIOGRAM REPORT: CPT | Performed by: INTERNAL MEDICINE

## 2025-03-23 PROCEDURE — 99285 EMERGENCY DEPT VISIT HI MDM: CPT | Performed by: EMERGENCY MEDICINE

## 2025-03-23 PROCEDURE — 70496 CT ANGIOGRAPHY HEAD: CPT

## 2025-03-23 PROCEDURE — 93005 ELECTROCARDIOGRAM TRACING: CPT

## 2025-03-23 PROCEDURE — 80048 BASIC METABOLIC PNL TOTAL CA: CPT

## 2025-03-23 PROCEDURE — 99284 EMERGENCY DEPT VISIT MOD MDM: CPT

## 2025-03-23 PROCEDURE — 36415 COLL VENOUS BLD VENIPUNCTURE: CPT

## 2025-03-23 PROCEDURE — 70498 CT ANGIOGRAPHY NECK: CPT

## 2025-03-23 RX ORDER — LISINOPRIL 5 MG/1
10 TABLET ORAL ONCE
Status: COMPLETED | OUTPATIENT
Start: 2025-03-23 | End: 2025-03-23

## 2025-03-23 RX ORDER — METOPROLOL TARTRATE 25 MG/1
25 TABLET, FILM COATED ORAL EVERY 12 HOURS SCHEDULED
Status: DISCONTINUED | OUTPATIENT
Start: 2025-03-23 | End: 2025-03-23 | Stop reason: HOSPADM

## 2025-03-23 RX ORDER — METOPROLOL TARTRATE 25 MG/1
25 TABLET, FILM COATED ORAL EVERY 12 HOURS SCHEDULED
Status: DISCONTINUED | OUTPATIENT
Start: 2025-03-23 | End: 2025-03-23

## 2025-03-23 RX ADMIN — IOHEXOL 85 ML: 350 INJECTION, SOLUTION INTRAVENOUS at 16:53

## 2025-03-23 RX ADMIN — LISINOPRIL 10 MG: 5 TABLET ORAL at 16:04

## 2025-03-23 NOTE — TELEMEDICINE
e-Consult ([facility]) - Neurology   Name: Estee Damon 74 y.o. female I MRN: [id number]  Unit/Bed#: ED 23 I Date of Admission: 3/23/2025   Date of Service: 3/23/2025 I Hospital Day: 0   Consult to neurology  Consult performed by: Caio Suarez DO  Consult ordered by: Dominik Alcantara DO        Physician Requesting Evaluation: Dominik Alcantara DO   Reason for Evaluation / Principal Problem: Stroke    ASSESSMENT:  # Embolic strokes, ESUS    74-year-old female with a past medical history of hypertension, hyperlipidemia, B12 deficiency, history of seizures, history of cerebellar ataxia, vascular dementia, and history of stroke with MRI in 2018 showing 2 tiny foci of ischemia 1 in the right frontal and left parieto-occipital region concerning for ESUS.  Patient had a repeat MRI with neuro quant which shows a few punctate foci of ischemia in the bilateral cerebral hemispheres concerning for subacute infarcts and was recommended to come to the hospital by her nursing home staff and neurology.  As patient has a second episode of recurrent embolic stroke, we will recommend empirically starting anticoagulation.    RECOMMENDATIONS:  Repeat CTA head and neck to ensure no significant findings that would explain these new strokes.  Read shows worsening severe stenosis of the proximal left vertebral artery V4 segment which would not explain her stroke pattern.  Will recommend patient to discontinue Plavix, continue on aspirin 81 mg daily as well as AC, such as Eliquis 5 mg every 12 hours.   Patient to follow-up with Dr. Ramírez in 6 weeks.  Staff message sent.       21-30 minutes, >50% of the total time devoted to medical consultative verbal/EMR discussion between providers. Written report will be generated in the EMR.

## 2025-03-23 NOTE — TELEMEDICINE
e-Consult ([facility]) - Neurology   Name: Estee Damon 74 y.o. female I MRN: [id number]  Unit/Bed#: ED 23 I Date of Admission: 3/23/2025   Date of Service: 3/23/2025 I Hospital Day: 0   Consult to neurology  Consult performed by: Caio Suarez DO  Consult ordered by: Dominik Alcantara DO        Physician Requesting Evaluation: Dominik Alcantara DO   Reason for Evaluation / Principal Problem: Stroke    ASSESSMENT:  # Embolic strokes, ESUS    74-year-old female with a past medical history of hypertension, hyperlipidemia, B12 deficiency, history of seizures, history of cerebellar ataxia, vascular dementia, and history of stroke with MRI in 2018 showing 2 tiny foci of ischemia 1 in the right frontal and left parieto-occipital region concerning for ESUS.  Patient had a repeat MRI with neuro quant which shows a few punctate foci of ischemia in the bilateral cerebral hemispheres concerning for subacute infarcts and was recommended to come to the hospital by her nursing home staff and neurology.  As patient has a second episode of recurrent embolic stroke, we will recommend empirically starting anticoagulation.    RECOMMENDATIONS:  Repeat CTA head and neck to ensure no significant findings that would explain these new strokes.  Read shows worsening severe stenosis of the proximal left vertebral artery V4 segment which would not explain her stroke pattern.  Will recommend patient to discontinue Plavix, continue on aspirin 81 mg daily as well as AC, such as Eliquis 5 mg every 12 hours.   Patient to follow-up with Dr. Ramírez in 6 weeks.  Staff message sent.       21-30 minutes, >50% of the total time devoted to medical consultative verbal/EMR discussion between providers. Written report will be generated in the EMR.

## 2025-03-23 NOTE — ED ATTENDING ATTESTATION
3/23/2025  I, Dominik Alcantara DO, saw and evaluated the patient. I have discussed the patient with the resident/non-physician practitioner and agree with the resident's/non-physician practitioner's findings, Plan of Care, and MDM as documented in the resident's/non-physician practitioner's note, except where noted. All available labs and Radiology studies were reviewed.  I was present for key portions of any procedure(s) performed by the resident/non-physician practitioner and I was immediately available to provide assistance.       At this point I agree with the current assessment done in the Emergency Department.  I have conducted an independent evaluation of this patient a history and physical is as follows:    ED Course  ED Course as of 03/23/25 1349   Sun Mar 23, 2025   1348 34-year-old female presents emergency department with noted evaluation of abnormal test for noted outpatient MRI that shows a new onset of stroke.  Was sent in for evaluation for this positive MRI test the patient has no complaints at this point time no symptoms.  Will reach out to neurology for recommendations.         Critical Care Time  Procedures

## 2025-03-23 NOTE — ED CARE HANDOFF
Emergency Department Sign Out Note        Sign out and transfer of care from Dr. Andrade. See Separate Emergency Department note.     The patient, Estee Damon, was evaluated by the previous provider for abnormal outpatient MRI.    Workup Completed:  Blood work    ED Course / Workup Pending (followup):  CT angiography head and neck                                  ED Course as of 03/23/25 1753   Sun Mar 23, 2025   1611 SO: abnormal MRI finding, subacute infarcts, will likely DC home after repeat CTaHN. Will f/u with neuro for eliquis dosing.    1748 Case was discussed with neurology resident, Dr. Suarez.  CTA head and neck results do not require admission per their interpretation.  Will transition patient to Eliquis 5 mg every 12 hours.  Will provide 1 month prescription.   1751 CTA head and neck with and without contrast  IMPRESSION:     CT Brain: No acute large vessel description infarct, hemorrhage or mass effect.  Tiny subacute infarcts identified on recent MRI are not well seen.  Stable chronic ischemic changes     CT Angiography: No significant stenosis of the cervical carotid or vertebral arteries.  Worsening severe focal stenosis in the proximal left V4 segment.  No other high-grade intracranial stenosis, large vessel occlusion or aneurysm.              Workstation performed: [id number]          Procedures  Medical Decision Making  No acute stroke.  Will change medications at the recommendation of neurology.  Will discharge with neurology follow-up.    Disposition: Discharged with instructions to obtain outpatient follow up of patient's symptoms and findings, with strict return precautions if patient develops new or worsening symptoms. Patient understands this plan and is agreeable. All questions answered. Patient discharged home with return precautions.      Amount and/or Complexity of Data Reviewed  Labs: ordered.  Radiology: ordered. Decision-making details documented in ED Course.    Risk  Prescription drug  management.            Disposition  Final diagnoses:   Abnormal MRI of head     Time reflects when diagnosis was documented in both MDM as applicable and the Disposition within this note       Time User Action Codes Description Comment    3/23/2025  2:06 PM Feliz Andrade [R93.0] Abnormal MRI of head           ED Disposition       ED Disposition   Discharge    Condition   Stable    Date/Time   Sun Mar 23, 2025  5:50 PM    Comment   Estee Damon discharge to home/self care.                   Follow-up Information       Follow up With Specialties Details Why Contact Info    Yane Flores MD Neurology   [address] [facility] [id number] [phone]            Patient's Medications   Discharge Prescriptions    APIXABAN (ELIQUIS) 5 MG    Take 1 tablet (5 mg total) by mouth 2 (two) times a day       Start Date: 3/23/2025 End Date: 4/22/2025       Order Dose: 5 mg       Quantity: 60 tablet    Refills: 0     No discharge procedures on file.       ED Provider  Electronically Signed by     Juan Ann DO  03/23/25 1749       Juan Ann DO  03/23/25 1753

## 2025-03-23 NOTE — DISCHARGE INSTRUCTIONS
Estee Damon was seen and evaluated today in the emergency department over your concern of abnormal MRI.  The workup that we performed showed no indications for hospital admission at this time, no large vessel occlusion please return to the emergency department if you experience headache, dizziness, nausea, vomiting, weakness or any other signs and symptoms that may be concerning to you.  Please follow-up with your primary care doctor within 1 week.  All questions were answered prior to discharge.  Thank you for choosing [facility] for your care.    Transition to Eliquis twice a day.  Follow-up with PCP and neurology for repeat prescription

## 2025-03-24 ENCOUNTER — TELEPHONE (OUTPATIENT)
Age: 75
End: 2025-03-24

## 2025-03-24 NOTE — TELEPHONE ENCOUNTER
1ST ATTEMPT,     Called pt no answer, LMOM ON MOBILE NUMBER, THEN CALLED NURSING HOME ASKED BY ADITYA TO CALL FAMILY MEMBER AND GUILLERMO MENJIVAR WHO SCHEDULES ALL APPTS     Thank you,     Dolly       U/ [facility]/ STROKE     DC- 3/23/2025- HOME    ----- Message from Caio Suarez DO sent at 3/23/2025  5:41 PM EDT -----  Regarding: HFU  Patient to follow-up with Dr. Ramírez in 6 weeks instead of 4 month follow up in July.

## 2025-03-24 NOTE — ED PROVIDER NOTES
Time reflects when diagnosis was documented in both MDM as applicable and the Disposition within this note       Time User Action Codes Description Comment    3/23/2025  2:06 PM Feliz Andrade Add [R93.0] Abnormal MRI of head           ED Disposition       ED Disposition   Discharge    Condition   Stable    Date/Time   Sun Mar 23, 2025  5:50 PM    Comment   Estee Damon discharge to home/self care.                   Assessment & Plan       Medical Decision Making  Patient 74-year-old female presenting for abnormal MRI findings.  DDx: Abnormal MRI findings, evaluate for possible new CVA  Based on patient presentation physical exam findings primary concern is for abnormal MRI findings.  After discussion with the patient patient's family, plan to contact neurology.  After discussion with neurology we will repeat baseline lab work and obtain CTA head and neck.  Most likely plan for medication adjustment from aspirin Plavix to aspirin and DOAC.  Patient signed out to evening team.  Dispo pending CTA head and neck.    Problems Addressed:  Abnormal MRI of head: acute illness or injury    Amount and/or Complexity of Data Reviewed  Labs: ordered.  Radiology: ordered.    Risk  Prescription drug management.        ED Course as of 03/24/25 1836   Sun Mar 23, 2025   1524 Spoke with  from neurology, plans for basic labs repeat CTA head and neck and solids no acute findings we will plan to transition patient from aspirin Plavix to aspirin and Eliquis, stopping the Plavix.       Medications   lisinopril (ZESTRIL) tablet 10 mg (10 mg Oral Given 3/23/25 1604)   iohexol (OMNIPAQUE) 350 MG/ML injection (MULTI-DOSE) 85 mL (85 mL Intravenous Given 3/23/25 1653)       ED Risk Strat Scores                            SBIRT 20yo+      Flowsheet Row Most Recent Value   Initial Alcohol Screen: US AUDIT-C     1. How often do you have a drink containing alcohol? 0 Filed at: 03/23/2025 5922   2. How many drinks containing alcohol do you have  on a typical day you are drinking?  0 Filed at: 03/23/2025 1414   3a. Male UNDER 65: How often do you have five or more drinks on one occasion? 0 Filed at: 03/23/2025 1414   3b. FEMALE Any Age, or MALE 65+: How often do you have 4 or more drinks on one occassion? 0 Filed at: 03/23/2025 1414   Audit-C Score 0 Filed at: 03/23/2025 1414   LEONID: How many times in the past year have you...    Used an illegal drug or used a prescription medication for non-medical reasons? Never Filed at: 03/23/2025 1414                            History of Present Illness       Chief Complaint   Patient presents with    Evaluation of Abnormal Diagnostic Test     Pt sent in from [facility] after having an MRI x2days ago for possible concern of stroke pt denies any symptoms        Past Medical History:   Diagnosis Date    Diverticulosis     Hypertension     Morbid obesity (HCC)     Osteoarthritis 2/7/2022    Pancreatic cyst     Seizures (HCC)     last seizure 2013    Stroke (HCC)     Type 2 diabetes mellitus without complication, without long-term current use of insulin (HCC) 12/10/2018    Wears glasses       Past Surgical History:   Procedure Laterality Date    APPENDECTOMY      COLONOSCOPY      complete    EYE SURGERY      TN COLONOSCOPY FLX DX W/COLLJ SPEC WHEN PFRMD N/A 03/06/2018    Procedure: COLONOSCOPY with polypectomy;  Surgeon: Jaymie Kruger MD;  Location: [facility];  Service: General      Family History   Problem Relation Age of Onset    Ovarian cancer Mother         Left ovary     Stroke Father     Colon cancer Father     Lung cancer Maternal Grandfather     Prostate cancer Paternal Grandmother     Lung cancer Brother     Other Other         MSH6-related Powres syndrome     Stroke Family     Ovarian cancer Family     Ovarian cancer Family     Breast cancer Family 42      Social History     Tobacco Use    Smoking status: Never    Smokeless tobacco: Never   Vaping Use    Vaping status: Never Used   Substance Use Topics    Alcohol use:  "No    Drug use: No      E-Cigarette/Vaping    E-Cigarette Use Never User       E-Cigarette/Vaping Substances    Nicotine No     THC No     CBD No     Flavoring No     Other No     Unknown No       I have reviewed and agree with the history as documented.     HPI  Patient 74-year-old female presenting for concerns of abnormal MRI findings.  Patient MRI done approximately 3 days ago on 3/20 with findings concerning for \" A few punctate foci of diffusion signal abnormality in the bilateral cerebral hemispheres, which are isointense to hyperintense on the ADC map, likely reflective of subacute infarcts. No significant mass effect or midline shift. Progressive marked chronic microangiopathy.\"  Patient currently denies any symptoms, states is at her neurologic baseline.  Has no complaints at present.  States she came purely because of the abnormal MRI findings and was told to present to the ER by her neurologist.  Review of Systems   Constitutional: Negative.    HENT: Negative.     Eyes: Negative.    Respiratory: Negative.     Cardiovascular: Negative.    Gastrointestinal: Negative.    Endocrine: Negative.    Genitourinary: Negative.    Musculoskeletal: Negative.    Skin: Negative.    Allergic/Immunologic: Negative.    Neurological: Negative.    Hematological: Negative.    Psychiatric/Behavioral: Negative.     All other systems reviewed and are negative.          Objective       ED Triage Vitals   Temperature Pulse Blood Pressure Respirations SpO2 Patient Position - Orthostatic VS   03/23/25 1315 03/23/25 1257 03/23/25 1257 03/23/25 1257 03/23/25 1257 03/23/25 1257   98.9 °F (37.2 °C) 58 (!) 190/78 16 94 % Sitting      Temp Source Heart Rate Source BP Location FiO2 (%) Pain Score    03/23/25 1315 03/23/25 1257 03/23/25 1257 -- --    Tympanic Monitor Right arm        Vitals      Date and Time Temp Pulse SpO2 Resp BP Pain Score FACES Pain Rating User   03/23/25 1830 --  54 94 % 20 200/100 -- -- EL   03/23/25 1730 -- 58 94 % " 18 215/95 -- -- SM   03/23/25 1628 -- 62 94 % 22 188/112 -- -- [place]   03/23/25 1600 -- 58 94 % 22 222/95 -- -- SM   03/23/25 1530 -- 58 94 % 19 213/77 -- -- SM   03/23/25 1500 -- 58 94 % 21 201/93 -- -- SM   03/23/25 1400 -- 56 94 % 24 180/84 -- -- SM   03/23/25 1315 98.9 °F (37.2 °C) -- -- -- -- -- -- EL   03/23/25 1300 -- 58 94 % 20 181/77 -- -- SM   03/23/25 1257 -- 58 94 % 16 190/78 -- -- EL            Physical Exam  Vitals and nursing note reviewed.   Constitutional:       Appearance: Normal appearance. She is normal weight.   HENT:      Head: Normocephalic and atraumatic.      Right Ear: Tympanic membrane, ear canal and external ear normal.      Left Ear: Tympanic membrane, ear canal and external ear normal.      Nose: Nose normal.      Mouth/Throat:      Mouth: Mucous membranes are moist.      Pharynx: Oropharynx is clear.   Eyes:      Extraocular Movements: Extraocular movements intact.      Conjunctiva/sclera: Conjunctivae normal.      Pupils: Pupils are equal, round, and reactive to light.   Cardiovascular:      Rate and Rhythm: Normal rate and regular rhythm.      Pulses: Normal pulses.      Heart sounds: Normal heart sounds.   Pulmonary:      Effort: Pulmonary effort is normal.      Breath sounds: Normal breath sounds.   Abdominal:      General: Abdomen is flat. Bowel sounds are normal.      Palpations: Abdomen is soft.   Musculoskeletal:      Cervical back: Neck supple.   Skin:     General: Skin is warm and dry.      Capillary Refill: Capillary refill takes less than 2 seconds.   Neurological:      Mental Status: She is alert and oriented to person, place, and time. Mental status is at baseline.   Psychiatric:         Mood and Affect: Mood normal.         Behavior: Behavior normal.         Results Reviewed       Procedure Component Value Units Date/Time    Basic metabolic panel [[id number]] Collected: 03/23/25 1531    Lab Status: Final result Specimen: Blood from Arm, Right Updated: 03/23/25 1600      Sodium 140 mmol/L      Potassium 3.7 mmol/L      Chloride 105 mmol/L      CO2 28 mmol/L      ANION GAP 7 mmol/L      BUN 12 mg/dL      Creatinine 0.88 mg/dL      Glucose 93 mg/dL      Calcium 8.9 mg/dL      eGFR 64 ml/min/1.73sq m     Narrative:      National Kidney Disease Foundation guidelines for Chronic Kidney Disease (CKD):     Stage 1 with normal or high GFR (GFR > 90 mL/min/1.73 square meters)    Stage 2 Mild CKD (GFR = 60-89 mL/min/1.73 square meters)    Stage 3A Moderate CKD (GFR = 45-59 mL/min/1.73 square meters)    Stage 3B Moderate CKD (GFR = 30-44 mL/min/1.73 square meters)    Stage 4 Severe CKD (GFR = 15-29 mL/min/1.73 square meters)    Stage 5 End Stage CKD (GFR <15 mL/min/1.73 square meters)  Note: GFR calculation is accurate only with a steady state creatinine    CBC and differential [[id number]] Collected: 03/23/25 1531    Lab Status: Final result Specimen: Blood from Arm, Right Updated: 03/23/25 1546     WBC 6.77 Thousand/uL      RBC 4.72 Million/uL      Hemoglobin 14.3 g/dL      Hematocrit 44.4 %      MCV 94 fL      MCH 30.3 pg      MCHC 32.2 g/dL      RDW 13.5 %      MPV 11.0 fL      Platelets 217 Thousands/uL      nRBC 0 /100 WBCs      Segmented % 70 %      Immature Grans % 0 %      Lymphocytes % 19 %      Monocytes % 8 %      Eosinophils Relative 2 %      Basophils Relative 1 %      Absolute Neutrophils 4.71 Thousands/µL      Absolute Immature Grans 0.03 Thousand/uL      Absolute Lymphocytes 1.31 Thousands/µL      Absolute Monocytes 0.55 Thousand/µL      Eosinophils Absolute 0.11 Thousand/µL      Basophils Absolute 0.06 Thousands/µL             CTA head and neck with and without contrast   Final Interpretation by E. Alec Schoenberger, MD (03/23 1724)      CT Brain: No acute large vessel description infarct, hemorrhage or mass effect.   Tiny subacute infarcts identified on recent MRI are not well seen.   Stable chronic ischemic changes      CT Angiography: No significant stenosis of the cervical  carotid or vertebral arteries.   Worsening severe focal stenosis in the proximal left V4 segment.   No other high-grade intracranial stenosis, large vessel occlusion or aneurysm.               Workstation performed: [id number]             Procedures    ED Medication and Procedure Management   Prior to Admission Medications   Prescriptions Last Dose Informant Patient Reported? Taking?   Blood Pressure Monitor MISC  Self No No   Sig: by Does not apply route daily   Blood Pressure Monitoring (BLOOD PRESSURE CUFF) MISC  Self No No   Sig: by Does not apply route daily   D3-1000 25 MCG (1000 UT) tablet  Self No No   Sig: TAKE 1 TABLET (1,000 UNITS TOTAL) BY MOUTH DAILY   Diclofenac Sodium (VOLTAREN) 1 %  Self No No   Sig: APPLY TOPICALLY TO PAINFUL AREA(S) ON SKIN TWO TIMES PER DAY AS NEEDED   Elastic Bandages & Supports (WRIST SPLINT/COCK-UP/LEFT L) MISC  Self No No   Sig: by Does not apply route daily   aspirin 81 mg chewable tablet  Self No No   Sig: CHEW 1 TABLET BY MOUTH EVERY DAY   atorvastatin (LIPITOR) 80 mg tablet  Self No No   Sig: Take 1 tablet (80 mg total) by mouth daily   clopidogrel (PLAVIX) 75 mg tablet  Self No No   Sig: TAKE 1 TABLET (75 MG TOTAL) BY MOUTH DAILY   donepezil (ARICEPT) 10 mg tablet  Self No No   Sig: TAKE ONE TABLET BY MOUTH DAILY   levETIRAcetam (KEPPRA) 750 mg tablet  Self No No   Sig: TAKE 1 TABLET (750 MG TOTAL) BY MOUTH IN THE MORNING AND 1 TABLET (750 MG TOTAL) IN THE EVENING.   lisinopril (ZESTRIL) 10 mg tablet   No No   Sig: Take 1 tablet (10 mg total) by mouth in the morning   metoprolol succinate (TOPROL-XL) 25 mg 24 hr tablet   No No   Sig: Take 1 tablet (25 mg total) by mouth daily at bedtime   Patient not taking: Reported on 2/25/2025   vitamin B-12 (VITAMIN B-12) 1,000 mcg tablet  Self No No   Sig: TAKE ONE TABLET BY MOUTH EVERY DAY      Facility-Administered Medications: None     Discharge Medication List as of 3/23/2025  5:52 PM        START taking these medications     Details   apixaban (Eliquis) 5 mg Take 1 tablet (5 mg total) by mouth 2 (two) times a day, Starting Sun 3/23/2025, Until Tue 4/22/2025, Normal           CONTINUE these medications which have NOT CHANGED    Details   aspirin 81 mg chewable tablet CHEW 1 TABLET BY MOUTH EVERY DAY, Starting Wed 1/15/2025, Normal      atorvastatin (LIPITOR) 80 mg tablet Take 1 tablet (80 mg total) by mouth daily, Starting Mon 2/17/2025, Until Sun 5/18/2025, Normal      Blood Pressure Monitor MISC by Does not apply route daily, Starting Thu 7/25/2019, Normal      Blood Pressure Monitoring (BLOOD PRESSURE CUFF) MISC by Does not apply route daily, Starting Thu 7/18/2019, Normal      D3-1000 25 MCG (1000 UT) tablet TAKE 1 TABLET (1,000 UNITS TOTAL) BY MOUTH DAILY, Normal      Diclofenac Sodium (VOLTAREN) 1 % APPLY TOPICALLY TO PAINFUL AREA(S) ON SKIN TWO TIMES PER DAY AS NEEDED, Normal      donepezil (ARICEPT) 10 mg tablet TAKE ONE TABLET BY MOUTH DAILY, Normal      Elastic Bandages & Supports (WRIST SPLINT/COCK-UP/LEFT L) MISC by Does not apply route daily, Starting Fri 9/27/2019, Print      levETIRAcetam (KEPPRA) 750 mg tablet TAKE 1 TABLET (750 MG TOTAL) BY MOUTH IN THE MORNING AND 1 TABLET (750 MG TOTAL) IN THE EVENING., Starting Thu 8/15/2024, Normal      lisinopril (ZESTRIL) 10 mg tablet Take 1 tablet (10 mg total) by mouth in the morning, Starting Fri 2/21/2025, Normal      metoprolol succinate (TOPROL-XL) 25 mg 24 hr tablet Take 1 tablet (25 mg total) by mouth daily at bedtime, Starting Fri 2/21/2025, Normal      vitamin B-12 (VITAMIN B-12) 1,000 mcg tablet TAKE ONE TABLET BY MOUTH EVERY DAY, Starting Thu 12/12/2024, Normal      clopidogrel (PLAVIX) 75 mg tablet TAKE 1 TABLET (75 MG TOTAL) BY MOUTH DAILY, Starting Mon 2/17/2025, Normal           No discharge procedures on file.  ED SEPSIS DOCUMENTATION   Time reflects when diagnosis was documented in both MDM as applicable and the Disposition within this note       Time User Action  Codes Description Comment    3/23/2025  2:06 PM Feliz Andrade Add [R93.0] Abnormal MRI of head                  Feliz Andrade MD  03/24/25 1103

## 2025-03-25 ENCOUNTER — TELEPHONE (OUTPATIENT)
Dept: FAMILY MEDICINE CLINIC | Facility: CLINIC | Age: 75
End: 2025-03-25

## 2025-03-25 ENCOUNTER — OFFICE VISIT (OUTPATIENT)
Dept: FAMILY MEDICINE CLINIC | Facility: CLINIC | Age: 75
End: 2025-03-25

## 2025-03-25 VITALS
SYSTOLIC BLOOD PRESSURE: 133 MMHG | WEIGHT: 188.2 LBS | HEART RATE: 74 BPM | RESPIRATION RATE: 18 BRPM | DIASTOLIC BLOOD PRESSURE: 82 MMHG | HEIGHT: 66 IN | OXYGEN SATURATION: 97 % | TEMPERATURE: 99.1 F | BODY MASS INDEX: 30.25 KG/M2

## 2025-03-25 DIAGNOSIS — Z86.73 HISTORY OF STROKE: ICD-10-CM

## 2025-03-25 DIAGNOSIS — I10 ESSENTIAL HYPERTENSION: ICD-10-CM

## 2025-03-25 DIAGNOSIS — E55.9 VITAMIN D DEFICIENCY: ICD-10-CM

## 2025-03-25 DIAGNOSIS — Z00.00 MEDICARE ANNUAL WELLNESS VISIT, SUBSEQUENT: Primary | ICD-10-CM

## 2025-03-25 DIAGNOSIS — R73.03 PREDIABETES: ICD-10-CM

## 2025-03-25 PROCEDURE — G0439 PPPS, SUBSEQ VISIT: HCPCS | Performed by: FAMILY MEDICINE

## 2025-03-25 RX ORDER — PHENOL 1.4 %
600 AEROSOL, SPRAY (ML) MUCOUS MEMBRANE 2 TIMES DAILY WITH MEALS
Qty: 180 TABLET | Refills: 1 | Status: SHIPPED | OUTPATIENT
Start: 2025-03-25

## 2025-03-25 RX ORDER — PHENOL 1.4 %
600 AEROSOL, SPRAY (ML) MUCOUS MEMBRANE 2 TIMES DAILY WITH MEALS
Qty: 180 TABLET | Refills: 1 | Status: SHIPPED | OUTPATIENT
Start: 2025-03-25 | End: 2025-03-25

## 2025-03-25 NOTE — TELEPHONE ENCOUNTER
Patients inocente Cruz called back to schedule HFU.    Scheduled 4/29/25 10:30 AM Yane Flores MD [facility]  HFU/ [facility]/ STROKE      DC- 3/23/2025- HOME     ----- Message from Caio Suarez DO sent at 3/23/2025  5:41 PM EDT -----  Regarding: HFU  Patient to follow-up with Dr. Ramírez in 6 weeks instead of 4 month follow up in July.

## 2025-03-25 NOTE — TELEPHONE ENCOUNTER
Form completed at time of visit. Original given back to patient, copy placed in the red clerical folder to be scanned to the chart.

## 2025-03-25 NOTE — PROGRESS NOTES
"Name: Estee Damon      : 1950      MRN: [id number]  Encounter Provider: Domenica Matos MD  Encounter Date: 3/25/2025   Encounter department: [facility]    Assessment & Plan  Medicare annual wellness visit, subsequent  - DXA scan ordered  - Mammogram previously ordered, patient due 2025   - Colonoscopy , recommend 5 year f/u  - Will discuss administering updated PNA vaccine and Shingles vaccine at visit in one month   - Family thinks she received annual flu shot from her facility    Plan  - F/U 1 month BP check   - Repeat labs 2025       History of stroke  Patient with history of CVA.  2013 MRI with multiple infarcts in PCA territory affecting cerebellar and occipital lobes.  2018 MRI NueroQuant with tiny foci of recent ischemia in right frontal lobe (1) and left parieto-occipital region (1).  24 lipid panel within normal limits with LDL of 33. Goal LDL < 70.  25 24 hour Holter monitor with frequent premature ventricular contractions (5.9%) with no non-sustained ventricular tachycardia, and occasional premature atrial contractions with no supraventricular tachycardia.  25 Echo with EF 60%.  Patient currently taking atorvastatin 80 mg.  MRI brain completed 3/20/25: \"A few punctate foci of diffusion signal abnormality in the bilateral cerebral hemispheres, which are isointense to hyperintense on the ADC map, likely reflective of subacute infarcts. No significant mass effect or midline shift. Progressive marked chronic microangiopathy\".   3/23/25: Patient instructed by Neurology to go to ED to repeat labs and CTA head and neck.   In ED, repeat CTA head and neck performed. Neurology consulted at that time. Per Neurology note from 3/23: \"Repeat CTA head and neck to ensure no significant findings that would explain these new strokes. Read shows worsening severe stenosis of the proximal left vertebral artery V4 segment which would not explain her " "stroke pattern\"  Neurology wanted patient to discontinue Plavix and start Eliquis 5mg BID and continue ASA daily  Called Scarlet Berry during office visit to confirm that patient's Plavix was discontinued and that she is currently taking Eliquis 5mg BID. Nurse from facility confirms.      Plan  Continue atorvastatin 80 mg at this time  Continue aspirin 81 mg and Eliquis 5mg BID  Patient following with Neurology, appreciate recommendations  Follow up in 1 month for BP recheck  Orders:    Lipid Panel With Direct LDL; Future    CBC and differential; Future    Comprehensive metabolic panel; Future    Hemoglobin A1C; Future    Essential hypertension  - BP goal <130/80  - In office today 133/82  - Patient compliant with Lisinopril 10mg daily   - Per facility BP monitored daily however no BP logs brought into office today    Plan  - Continue Lisinopril daily  - Bring BP logs to next visit   - Repeat labs in 6 months   - F/U in one month for BP check   Orders:    CBC and differential; Future    Vitamin D deficiency  - Last DXA scan 4/2019 resulted with normal findings  - patient has been taking Vitamin D daily but has not been taking Calcium daily     Plan  - Start Calcium Carbonate 600mg BID   - Continue Vitamin D 1,000 IU daily   - Repeat DXA scan ordered   Orders:    DXA bone density spine hip and pelvis; Future    calcium carbonate (OS-KARIN) 600 MG tablet; Take 1 tablet (600 mg total) by mouth 2 (two) times a day with meals    BMI Counseling: Body mass index is 30.38 kg/m². The BMI is above normal. Nutrition recommendations include consuming healthier snacks, limiting drinks that contain sugar and moderation in carbohydrate intake. Exercise recommendations include strength training exercises. No pharmacotherapy was ordered. Rationale for BMI follow-up plan is due to patient being overweight or obese.       Preventive health issues were discussed with patient, and age appropriate screening tests were ordered as " noted in patient's After Visit Summary. Personalized health advice and appropriate referrals for health education or preventive services given if needed, as noted in patient's After Visit Summary.    History of Present Illness     Patient presenting for Medicare Annual Wellness visit. She resides at [facility] in assisted living. Family accompanied her today and states all medications are dispensed by nursing team at facility. Family denies any new concerns for patient today. However patient was seen in ED on 3/23/25 after outpatient MRI brain showed concerning findings. Patient was instructed to discontinue her Plavix and to start Eliquis 5mg BID as well as continue ASA.        Patient Care Team:  Domenica Matos MD as PCP - General (Family Medicine)  Jaymie Kruger MD as Endoscopist    Review of Systems   Constitutional:  Negative for activity change and appetite change.   Respiratory:  Negative for shortness of breath.    Cardiovascular:  Negative for chest pain.   Gastrointestinal:  Negative for nausea.   Genitourinary:  Negative for difficulty urinating.   Neurological:  Negative for headaches.     Medical History Reviewed by provider this encounter:       Annual Wellness Visit Questionnaire   Estee is here for her Subsequent Wellness visit.     Health Risk Assessment:   Patient rates overall health as good. Patient feels that their physical health rating is same. Patient is satisfied with their life. Eyesight was rated as slightly worse. Hearing was rated as same. Patient feels that their emotional and mental health rating is same. Patients states they are never, rarely angry. Patient states they are never, rarely unusually tired/fatigued. Pain experienced in the last 7 days has been none. Patient states that she has experienced no weight loss or gain in last 6 months.     Depression Screening:   PHQ-2 Score: 0      Fall Risk Screening:   In the past year, patient has experienced: history of  falling in past year    Number of falls: 1  Injured during fall?: No    Feels unsteady when standing or walking?: No    Worried about falling?: No      Urinary Incontinence Screening:   Patient has leaked urine accidently in the last six months.     Home Safety:  Patient has trouble with stairs inside or outside of their home. Patient has working smoke alarms and has working carbon monoxide detector. Home safety hazards include: none. Patient lives in assisted living facility [facility]     Nutrition:   Current diet is Regular.     Medications:   Patient is not currently taking any over-the-counter supplements. Patient is not able to manage medications. She has someone give her daily medications at [facility]     Activities of Daily Living (ADLs)/Instrumental Activities of Daily Living (IADLs):   Walk and transfer into and out of bed and chair?: Yes  Dress and groom yourself?: Yes    Bathe or shower yourself?: Yes    Feed yourself? Yes  Do your laundry/housekeeping?: No  Manage your money, pay your bills and track your expenses?: No  Make your own meals?: No    Do your own shopping?: No    ADL comments: Resides at [facility].     Previous Hospitalizations:   Any hospitalizations or ED visits within the last 12 months?: Yes      Hospitalization Comments: ED visit 3/23/25 for abnormal MRI Brain    Advance Care Planning:   Living will: No    Durable POA for healthcare: Yes    Advanced directive: No    Advanced directive counseling given: Yes      Comments: Nancy Allen-patient's niece is patient's medical and financial POA     PREVENTIVE SCREENINGS      Cardiovascular Screening:    General: Screening Not Indicated and History Lipid Disorder      Diabetes Screening:     General: Screening Current      Colorectal Cancer Screening:     General: Screening Current      Breast Cancer Screening:     General: Screening Current      Cervical Cancer Screening:    General: Screening Not Indicated       "Osteoporosis Screening:    General: Risks and Benefits Discussed    Due for: DXA Axial      Abdominal Aortic Aneurysm (AAA) Screening:        General: Screening Not Indicated      Lung Cancer Screening:     General: Screening Not Indicated      Hepatitis C Screening:    General: Screening Current    Screening, Brief Intervention, and Referral to Treatment (SBIRT)     Screening      Single Item Drug Screening:  How often have you used an illegal drug (including marijuana) or a prescription medication for non-medical reasons in the past year? never    Single Item Drug Screen Score: 0  Interpretation: Negative screen for possible drug use disorder    Other Counseling Topics:   Sunscreen and calcium and vitamin D intake and regular weightbearing exercise.     Social Drivers of Health     Financial Resource Strain: Low Risk  (2/25/2025)    Overall Financial Resource Strain (CARDIA)     Difficulty of Paying Living Expenses: Not hard at all   Food Insecurity: No Food Insecurity (2/25/2025)    Hunger Vital Sign     Worried About Running Out of Food in the Last Year: Never true     Ran Out of Food in the Last Year: Never true   Transportation Needs: No Transportation Needs (2/25/2025)    PRAPARE - Transportation     Lack of Transportation (Medical): No     Lack of Transportation (Non-Medical): No   Housing Stability: Low Risk  (2/25/2025)    Housing Stability Vital Sign     Unable to Pay for Housing in the Last Year: No     Number of Times Moved in the Last Year: 1     Homeless in the Last Year: No   Utilities: Not At Risk (2/25/2025)    [facility] Utilities     Threatened with loss of utilities: No     No results found.    Objective   /82 (BP Location: Left arm, Patient Position: Sitting, Cuff Size: Large)   Pulse 74   Temp 99.1 °F (37.3 °C) (Temporal)   Resp 18   Ht 5' 6\" (1.676 m)   Wt 85.4 kg (188 lb 3.2 oz)   SpO2 97%   BMI 30.38 kg/m²     Physical Exam  HENT:      Head: Normocephalic and atraumatic.      " Mouth/Throat:      Mouth: Mucous membranes are moist.      Pharynx: Oropharynx is clear.   Cardiovascular:      Rate and Rhythm: Normal rate and regular rhythm.   Pulmonary:      Effort: Pulmonary effort is normal. No respiratory distress.      Breath sounds: Normal breath sounds. No wheezing.   Skin:     General: Skin is warm and dry.   Neurological:      Mental Status: She is alert. Mental status is at baseline.   Psychiatric:         Mood and Affect: Mood normal.         Behavior: Behavior normal.               Domenica Matos MD   PGY-2, Family Medicine  [facility]

## 2025-03-26 NOTE — ASSESSMENT & PLAN NOTE
- Last DXA scan 4/2019 resulted with normal findings  - patient has been taking Vitamin D daily but has not been taking Calcium daily     Plan  - Start Calcium Carbonate 600mg BID   - Continue Vitamin D 1,000 IU daily   - Repeat DXA scan ordered   Orders:    DXA bone density spine hip and pelvis; Future    calcium carbonate (OS-KARIN) 600 MG tablet; Take 1 tablet (600 mg total) by mouth 2 (two) times a day with meals

## 2025-03-26 NOTE — ASSESSMENT & PLAN NOTE
"Patient with history of CVA.  11/2013 MRI with multiple infarcts in PCA territory affecting cerebellar and occipital lobes.  5/2018 MRI NueroQuant with tiny foci of recent ischemia in right frontal lobe (1) and left parieto-occipital region (1).  12/11/24 lipid panel within normal limits with LDL of 33. Goal LDL < 70.  2/12/25 24 hour Holter monitor with frequent premature ventricular contractions (5.9%) with no non-sustained ventricular tachycardia, and occasional premature atrial contractions with no supraventricular tachycardia.  2/21/25 Echo with EF 60%.  Patient currently taking atorvastatin 80 mg.  MRI brain completed 3/20/25: \"A few punctate foci of diffusion signal abnormality in the bilateral cerebral hemispheres, which are isointense to hyperintense on the ADC map, likely reflective of subacute infarcts. No significant mass effect or midline shift. Progressive marked chronic microangiopathy\".   3/23/25: Patient instructed by Neurology to go to ED to repeat labs and CTA head and neck.   In ED, repeat CTA head and neck performed. Neurology consulted at that time. Per Neurology note from 3/23: \"Repeat CTA head and neck to ensure no significant findings that would explain these new strokes. Read shows worsening severe stenosis of the proximal left vertebral artery V4 segment which would not explain her stroke pattern\"  Neurology wanted patient to discontinue Plavix and start Eliquis 5mg BID and continue ASA daily  Called Scarlet Berry during office visit to confirm that patient's Plavix was discontinued and that she is currently taking Eliquis 5mg BID. Nurse from facility confirms.      Plan  Continue atorvastatin 80 mg at this time  Continue aspirin 81 mg and Eliquis 5mg BID  Patient following with Neurology, appreciate recommendations  Follow up in 1 month for BP recheck  Orders:    Lipid Panel With Direct LDL; Future    CBC and differential; Future    Comprehensive metabolic panel; Future    " Hemoglobin A1C; Future

## 2025-03-26 NOTE — ASSESSMENT & PLAN NOTE
- BP goal <130/80  - In office today 133/82  - Patient compliant with Lisinopril 10mg daily   - Per facility BP monitored daily however no BP logs brought into office today    Plan  - Continue Lisinopril daily  - Bring BP logs to next visit   - Repeat labs in 6 months   - F/U in one month for BP check   Orders:    CBC and differential; Future

## 2025-03-28 ENCOUNTER — TELEPHONE (OUTPATIENT)
Dept: FAMILY MEDICINE CLINIC | Facility: CLINIC | Age: 75
End: 2025-03-28

## 2025-04-02 DIAGNOSIS — I63.9 CEREBROVASCULAR ACCIDENT (CVA), UNSPECIFIED MECHANISM (HCC): ICD-10-CM

## 2025-04-03 RX ORDER — CLOPIDOGREL BISULFATE 75 MG/1
75 TABLET ORAL DAILY
Qty: 30 TABLET | Refills: 5 | OUTPATIENT
Start: 2025-04-03

## 2025-04-04 ENCOUNTER — TELEPHONE (OUTPATIENT)
Dept: FAMILY MEDICINE CLINIC | Facility: CLINIC | Age: 75
End: 2025-04-04

## 2025-04-09 ENCOUNTER — TELEPHONE (OUTPATIENT)
Dept: FAMILY MEDICINE CLINIC | Facility: CLINIC | Age: 75
End: 2025-04-09

## 2025-04-09 NOTE — TELEPHONE ENCOUNTER
License # requested     [facility]     Form to be reviewed by Dr Matos    Fax back to [phone]

## 2025-04-15 DIAGNOSIS — R93.0 ABNORMAL MRI OF HEAD: ICD-10-CM

## 2025-04-15 RX ORDER — APIXABAN 5 MG/1
5 TABLET, FILM COATED ORAL 2 TIMES DAILY
Qty: 180 TABLET | Refills: 1 | Status: SHIPPED | OUTPATIENT
Start: 2025-04-15

## 2025-04-24 ENCOUNTER — TELEPHONE (OUTPATIENT)
Dept: FAMILY MEDICINE CLINIC | Facility: CLINIC | Age: 75
End: 2025-04-24

## 2025-04-29 ENCOUNTER — OFFICE VISIT (OUTPATIENT)
Dept: NEUROLOGY | Facility: CLINIC | Age: 75
End: 2025-04-29
Payer: MEDICARE

## 2025-04-29 VITALS
BODY MASS INDEX: 30.61 KG/M2 | WEIGHT: 195 LBS | SYSTOLIC BLOOD PRESSURE: 140 MMHG | OXYGEN SATURATION: 95 % | TEMPERATURE: 97.7 F | HEART RATE: 75 BPM | HEIGHT: 67 IN | DIASTOLIC BLOOD PRESSURE: 80 MMHG

## 2025-04-29 DIAGNOSIS — R56.9 SEIZURE (HCC): ICD-10-CM

## 2025-04-29 DIAGNOSIS — I63.9 EMBOLIC STROKE (HCC): Primary | ICD-10-CM

## 2025-04-29 DIAGNOSIS — F01.50 VASCULAR DEMENTIA (HCC): ICD-10-CM

## 2025-04-29 PROCEDURE — 99213 OFFICE O/P EST LOW 20 MIN: CPT | Performed by: PSYCHIATRY & NEUROLOGY

## 2025-04-29 PROCEDURE — G2211 COMPLEX E/M VISIT ADD ON: HCPCS | Performed by: PSYCHIATRY & NEUROLOGY

## 2025-04-29 NOTE — PROGRESS NOTES
Name: Estee Damon      : 1950      MRN: [id number]  Encounter Provider: Yane Ramírez MD  Encounter Date: 2025   Encounter department: [facility]  :  Assessment & Plan  Embolic stroke (HCC)  MRI in 2018 showed 2 tiny foci of  ischemia, one each in the right frontal and left parieto-occipital region. When I first saw patient she was on aspirin, Plavix and atorvastatin. Patient with a loop recorder in place which is not working. She denied any new stroke like sx at the time of our first visit. I ordered an MRI NeuroQuant to evaluate hippocampus as well as for new areas of stroke. This was done in March of this year and it showed a few punctate foci in bilateral hemispheres suggestive of a cardioembolic source. Patient was started on Eliquis and Plavix was discontinued.    Plan:  Will focus on stroke risk prevention  Antiplatelet therapy: continue aspirin 81 mg  Anticoagulation: Eliquis 5 mg bid  Goal LDL < 70    Continue atorvastatin. Can consider decrease down to 40 mg   Monitor blood pressure with goal <130/80  Counseled on lifestyle measures to reduce stroke burden if applicable, such as:  Smoking cessation   Reducing alcohol intake  Encouraging physical activity  Encouraging weight loss  A low-carbohydrate diet reducing the intake of foods rich in carbohydrates, such as bread, pasta, rice, and sugary snacks.Emphasizes foods that are high in protein, healthy fats, and non-starchy vegetables  Regular follow-up with their PCP  If they have any stroke-like symptoms including sudden painless loss of vision or double vision, difficulty speaking or swallowing, vertigo/room spinning that does not quickly resolve, or weakness/numbness affecting 1 side of the face or body he should proceed by ambulance to the nearest emergency room immediately.  Follow-up in 6 months       Orders:    MRI brain NeuroQuant wo contrast; Future    Vascular dementia (HCC)  Patient with  significant white matter disease as well as old strokes on MRI. NeuroQuant analysis with low hippocampal volume.     Will repeat MRI NeuroQuant in 6 months to evaluate progression. Recommend working on modifying stroke risk factors above to prevent additional progression. Follow up in 6 months.   Orders:    MRI brain NeuroQuant wo contrast; Future    Seizure (HCC)  Patient with a history of epilepsy well controlled for 30 years. Iast seizure in 2013 where she had a recurrence and was started on Keppra. Today she denies any new seizures since I last saw her.     Continue Keppra 750 mg bid             History of Present Illness   HPI     Patient is a 74-year-old female with history of hypertension, hyperlipidemia, B12 deficiency, osteoarthritis, premature atrial contractions and seizures who presents for follow up.     Initial visit (2/20/2025):    Vascular dementia  Patient with cognitive impairment maintained on donepezil. There is extensive white matter change on previous MRI in 2018.      Likely Vascular dementia. Will repeat MRI NeuroQuant to evaluate changes in hippocampal volume and new areas of stroke.      History of stroke:  MRI in 2018 showed 2 tiny foci of recent ischemia, one each in the right frontal and left parieto-occipital region. Patient on  aspirin, Plavix and atorvastatin. They deny any new stroke like symptom. She has a loop recorder in place which is not working. She follows with cardiology. Holter monitor recently done. Echocardiogram scheduled for later this week. Recent LDL 33.      MRI brain ordered to evaluate new areas of stroke. Continued on aspirin, plavix and atorvastatin.      Cerebellar ataxia   MRI in 2013 with multiple discontiguous and confluent foci of acute ischemia in the posterior circulation affecting the supra and infratentorial parenchyma.      Seizure  As per chart review, patient with a history of epilepsy which was well controlled for about 30 years. In 2013 she had  "recurrent seizures and she began Keppra at that time. Last seizure around 2013. Patient with history of seizures maintained on Keppra 750 mg twice daily. She denies any interval seizures since she was last seen by neurology.      Patient today is stable. Continue Keppra 750 mg bid.        Essential tremor   Patient with many years of \"no, no\" head tremor.     Workup:  Echo 2/21/2025: normal left atrium EF of 60%    CTH: No acute large vessel description infarct, hemorrhage or mass effect. Tiny subacute infarcts identified on recent MRI are not well seen. Stable chronic ischemic changes     CTA: No significant stenosis of the cervical carotid or vertebral arteries. Worsening severe focal stenosis in the proximal left V4 segment. No other high-grade intracranial stenosis, large vessel occlusion or aneurysm.     MRI BRAIN NEUROQUANT WO CONTRAST (Final) 3/20/2025  5:12 PM    Impression  1.  A few punctate foci of diffusion signal abnormality in the bilateral cerebral hemispheres, which are isointense to hyperintense on the ADC map, likely reflective of subacute infarcts. No significant mass effect or midline shift. Progressive marked  chronic microangiopathy.  2.  NeuroQuant analysis was performed: Low hippocampal volume without ex-vacuo dilatation. Furthermore, the presence of an abnormal hippocampal occupancy score is identified. The constellation of findings is concerning for a mesial temporal lobe focused  neurodegenerative process. Recommend reevaluation with neuroquant imaging in 6 months. Hippocampal and overall volume loss are progressed from 5/10/2018.    Holter monitor (02/12/2025):  1. A 24 hour holter monitor demonstrated normal sinus rhythm with an average rate of 67 BPM; a minimum rate of 46 BPM; and a maximum rate of 118 BPM.  2. There were 5702 (5.9%) ventricular ectopic beats, the majority of which were premature ventricular contractions.  There were 16 ventricular couplets, but no runs of non-sustained " ventricular tachycardia.  3. There were 765 supraventricular ectopic beats, the majority of which were premature atrial contractions.  There were 36 atrial couplets, 2 atrial triplets, but no runs of supraventricular tachycardia.  4. The longest R-R interval was 1.7 seconds.  5. The patient did not return a diary with holter monitor.     MRI BRAIN NEUROQUANT WO AND W CONTRAST (Final) 5/10/2018 12:02 PM     Impression  1.  There may be 2 tiny foci of recent ischemia, one each in the right frontal and left parieto-occipital region.  Diffuse generalized volume loss, advanced chronic large and small vessel ischemia.  2.  NeuroQuant analysis was performed: There does appear to be selective volume loss of the hippocampus and prominence of the temporal horn 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.  This occurs in the face of diffuse chronic  large and small vessel ischemia.       Interval history:    MRI in March with subacute infarcts  Recommended patient discontinue plavix and continue on aspirin and eliquis  No new stroke like sx  Fingers and toes have been bothering her   No new seizures    Review of Systems   Constitutional:  Negative for appetite change, fatigue and fever.   HENT: Negative.  Negative for hearing loss, tinnitus, trouble swallowing and voice change.    Eyes: Negative.  Negative for photophobia, pain and visual disturbance.   Respiratory: Negative.  Negative for shortness of breath.    Cardiovascular: Negative.  Negative for palpitations.   Gastrointestinal: Negative.  Negative for nausea and vomiting.   Endocrine: Negative.  Negative for cold intolerance.   Genitourinary: Negative.  Negative for dysuria, frequency and urgency.   Musculoskeletal:  Negative for back pain, gait problem, myalgias, neck pain and neck stiffness.   Skin: Negative.  Negative for rash.   Allergic/Immunologic: Negative.    Neurological:  Negative for dizziness, tremors, seizures, syncope, facial  "asymmetry, speech difficulty, weakness, light-headedness, numbness and headaches.   Hematological: Negative.  Does not bruise/bleed easily.   Psychiatric/Behavioral: Negative.  Negative for confusion, hallucinations and sleep disturbance.    All other systems reviewed and are negative.   I have personally reviewed the MA's review of systems and made changes as necessary.         Objective   /80 (BP Location: Left arm, Patient Position: Sitting, Cuff Size: Standard)   Pulse 75   Temp 97.7 °F (36.5 °C) (Temporal)   Ht 5' 7\" (1.702 m)   Wt 88.5 kg (195 lb)   SpO2 95%   BMI 30.54 kg/m²     Physical Exam  Neurological Exam          "

## 2025-05-06 DIAGNOSIS — G31.84 MILD COGNITIVE IMPAIRMENT: ICD-10-CM

## 2025-05-06 RX ORDER — DONEPEZIL HYDROCHLORIDE 10 MG/1
10 TABLET, FILM COATED ORAL DAILY
Qty: 90 TABLET | Refills: 1 | Status: SHIPPED | OUTPATIENT
Start: 2025-05-06

## 2025-05-14 DIAGNOSIS — E55.9 VITAMIN D DEFICIENCY: ICD-10-CM

## 2025-05-14 RX ORDER — MULTIVIT-MIN/IRON/FOLIC ACID/K 18-600-40
1000 CAPSULE ORAL DAILY
Qty: 90 TABLET | Refills: 3 | Status: SHIPPED | OUTPATIENT
Start: 2025-05-14

## 2025-05-16 NOTE — PROGRESS NOTES
Name: Estee Damon      : 1950      MRN: [id number]  Encounter Provider: Yane Ramírez MD  Encounter Date: 2025   Encounter department: [facility] [place]  :  Assessment & Plan      {Ambulatory Patient Instructions (Optional):90099}    History of Present Illness {?Quick Links Encounters * My Last Note * Last Note in Specialty * Snapshot * Since Last Visit * History :02507}  HPI   Review of Systems   Constitutional:  Negative for appetite change, fatigue and fever.   HENT: Negative.  Negative for hearing loss, tinnitus, trouble swallowing and voice change.    Eyes: Negative.  Negative for photophobia, pain and visual disturbance.   Respiratory: Negative.  Negative for shortness of breath.    Cardiovascular: Negative.  Negative for palpitations.   Gastrointestinal: Negative.  Negative for nausea and vomiting.   Endocrine: Negative.  Negative for cold intolerance.   Genitourinary: Negative.  Negative for dysuria, frequency and urgency.   Musculoskeletal:  Negative for back pain, gait problem, myalgias, neck pain and neck stiffness.   Skin: Negative.  Negative for rash.   Allergic/Immunologic: Negative.    Neurological:  Negative for dizziness, tremors, seizures, syncope, facial asymmetry, speech difficulty, weakness, light-headedness, numbness and headaches.   Hematological: Negative.  Does not bruise/bleed easily.   Psychiatric/Behavioral: Negative.  Negative for confusion, hallucinations and sleep disturbance.    All other systems reviewed and are negative.   I have personally reviewed the MA's review of systems and made changes as necessary.    {Select to insert medical history sections (Optional):19969}     Objective {?Quick Links Trend Vitals * Enter New Vitals * Results Review * Timeline (Adult) * Labs * Imaging * Cardiology * Procedures * Lung Cancer Screening * Surgical eConsent :06783}  /80 (BP Location: Left arm, Patient Position: Sitting, Cuff Size:  "Standard)   Pulse 75   Temp 97.7 °F (36.5 °C) (Temporal)   Ht 5' 7\" (1.702 m)   Wt 88.5 kg (195 lb)   SpO2 95%   BMI 30.54 kg/m²     Physical Exam  Neurological Exam    {Radiology Results Review (Optional):70738}    {Administrative / Billing Section (Optional):70261}  "

## 2025-05-28 NOTE — TELEPHONE ENCOUNTER
Anesthesia Post-op Note    Patient: Edmundo Garcia  Procedure(s) Performed: COLONOSCOPY  EGD (ESOPHAGOGASTRODUODENOSCOPY)  Anesthesia type: MAC    Vitals Value Taken Time   Temp 36.8 05/28/25 0824   Pulse 61 05/28/25 0824   Resp 20 05/28/25 0824   SpO2 95 05/28/25 0824   /70 05/28/25 0824         Patient Location: bedside  Post-op Vital Signs:stable  Level of Consciousness: participates in exam and awake  Respiratory Status: spontaneous ventilation  Cardiovascular blood pressure returned to baseline  Hydration: unable to assess  Pain Management: adequately controlled  Handoff: Handoff to receiving clinician was performed and questions were answered  Vomiting: none  Nausea: None  Airway Patency:patent  Post-op Assessment: awake, alert, appropriately conversant, or baseline, no complications, patient tolerated procedure well, no evidence of recall, dentition, mouth, tongue, and oropharynx unchanged , moving all extremities and No Corneal Abrasion      There were no known notable events for this encounter.                       Folder (To be completed by) - Dr Kat Wiseman     Name of [facility] Outpatient prescription form    Form to be Faxed to [phone]    Patient was made aware of the 7 business day form policy

## 2025-06-06 NOTE — ASSESSMENT & PLAN NOTE
Patient with a history of epilepsy well controlled for 30 years. Iast seizure in 2013 where she had a recurrence and was started on Keppra. Today she denies any new seizures since I last saw her.     Continue Keppra 750 mg bid

## 2025-06-12 ENCOUNTER — TELEPHONE (OUTPATIENT)
Age: 75
End: 2025-06-12

## 2025-06-12 ENCOUNTER — ESTABLISHED COMPREHENSIVE EXAM (OUTPATIENT)
Dept: URBAN - METROPOLITAN AREA CLINIC 6 | Facility: CLINIC | Age: 75
End: 2025-06-12

## 2025-06-12 DIAGNOSIS — H35.372: ICD-10-CM

## 2025-06-12 DIAGNOSIS — H43.813: ICD-10-CM

## 2025-06-12 DIAGNOSIS — H02.831: ICD-10-CM

## 2025-06-12 DIAGNOSIS — H02.834: ICD-10-CM

## 2025-06-12 DIAGNOSIS — E11.9: ICD-10-CM

## 2025-06-12 DIAGNOSIS — H25.811: ICD-10-CM

## 2025-06-12 PROCEDURE — 92014 COMPRE OPH EXAM EST PT 1/>: CPT

## 2025-06-12 ASSESSMENT — VISUAL ACUITY
OD_CC: 20/200
OS_CC: 20/50-2

## 2025-06-12 ASSESSMENT — TONOMETRY
OS_IOP_MMHG: 10
OD_IOP_MMHG: 12

## 2025-06-12 NOTE — TELEPHONE ENCOUNTER
Patients inocente Cruz called in to cancel appointment on 7/18/2025 with Dr. Ramírez. Nancy states she will be keeping the appointment as scheduled 10/29/2025 at 10:30 am with Dr. Ramírez in [place].

## 2025-06-13 DIAGNOSIS — I63.019 CEREBROVASCULAR ACCIDENT (CVA) DUE TO THROMBOSIS OF VERTEBRAL ARTERY, UNSPECIFIED BLOOD VESSEL LATERALITY (HCC): ICD-10-CM

## 2025-06-13 RX ORDER — ASPIRIN 81 MG/1
81 TABLET, CHEWABLE ORAL DAILY
Qty: 30 TABLET | Refills: 4 | Status: SHIPPED | OUTPATIENT
Start: 2025-06-13

## 2025-06-30 ENCOUNTER — OFFICE VISIT (OUTPATIENT)
Dept: CARDIOLOGY CLINIC | Facility: CLINIC | Age: 75
End: 2025-06-30
Payer: MEDICARE

## 2025-06-30 VITALS
DIASTOLIC BLOOD PRESSURE: 70 MMHG | BODY MASS INDEX: 29.23 KG/M2 | WEIGHT: 186.6 LBS | OXYGEN SATURATION: 99 % | SYSTOLIC BLOOD PRESSURE: 142 MMHG | HEART RATE: 83 BPM

## 2025-06-30 DIAGNOSIS — I10 ESSENTIAL HYPERTENSION: ICD-10-CM

## 2025-06-30 DIAGNOSIS — I49.1 PAC (PREMATURE ATRIAL CONTRACTION): Primary | ICD-10-CM

## 2025-06-30 DIAGNOSIS — E78.2 MIXED HYPERLIPIDEMIA: ICD-10-CM

## 2025-06-30 PROCEDURE — 99214 OFFICE O/P EST MOD 30 MIN: CPT | Performed by: INTERNAL MEDICINE

## 2025-06-30 RX ORDER — METOPROLOL SUCCINATE 50 MG/1
50 TABLET, EXTENDED RELEASE ORAL
Qty: 90 TABLET | Refills: 3 | Status: SHIPPED | OUTPATIENT
Start: 2025-06-30

## 2025-06-30 RX ORDER — METOPROLOL SUCCINATE 50 MG/1
50 TABLET, EXTENDED RELEASE ORAL
Qty: 90 TABLET | Refills: 3 | Status: SHIPPED | OUTPATIENT
Start: 2025-06-30 | End: 2025-06-30 | Stop reason: SDUPTHER

## 2025-06-30 NOTE — PATIENT INSTRUCTIONS
Increase Metoprolol succinate to 50 mg by mouth once daily at bedtime.   Continue other cardiac medications.

## 2025-06-30 NOTE — ASSESSMENT & PLAN NOTE
LDL goal less than 55 given prior history of CVA.  Most recent lipid panel on December 11, 2024 showed at target goal. Continue high intensity atorvastatin.

## 2025-06-30 NOTE — ASSESSMENT & PLAN NOTE
24-hour Holter monitor on February 12, 2025 showed frequent PVC with 5.9% burden, no runs of nonsustained VT, rare PAC with less than 1% burden.  At present, patient is asymptomatic.  Continue oral beta-blocker.

## 2025-06-30 NOTE — ASSESSMENT & PLAN NOTE
BP goal of less than 130/80 given prior history of CVA.  Since blood pressure is elevated, patient was advised to increase metoprolol succinate to 50 mg p.o. once daily which should also help control the frequent PVC noted on recent Holter monitor.

## 2025-06-30 NOTE — PROGRESS NOTES
Cardiology Follow Up    Estee Damon  1950  [id number]  [facility] [place]  [address] [address] [phone]    Assessment & Plan  PAC (premature atrial contraction)  24-hour Holter monitor on February 12, 2025 showed frequent PVC with 5.9% burden, no runs of nonsustained VT, rare PAC with less than 1% burden.  At present, patient is asymptomatic.  Continue oral beta-blocker.  Essential hypertension  BP goal of less than 130/80 given prior history of CVA.  Since blood pressure is elevated, patient was advised to increase metoprolol succinate to 50 mg p.o. once daily which should also help control the frequent PVC noted on recent Holter monitor.  Mixed hyperlipidemia  LDL goal less than 55 given prior history of CVA.  Most recent lipid panel on December 11, 2024 showed at target goal. Continue high intensity atorvastatin.      Interval History:   This is a 74-year-old female with known history of CVA with MRI showed multiple infarcts in the PCA territory affecting both cerebellar and occipital lobes on November 2013, history of seizure disorder, hypertension, hyperlipidemia.  ANSELMO on November 2018 showed no evidence of cardioembolic source. ILR was implanted in January 2019.      Transthoracic echo on February 21, 2025 showed normal biventricular systolic function, LVEF 60%, mild concentric LVH, mild left ventricular diastolic dysfunction.  24-hour Holter monitor on February 12, 2025 showed frequent PVC with 5.9% burden, no runs of nonsustained VT, rare PAC with less than 1% burden.     Since last office visit, patient denies any cardiac complaints. No clinical heart failure. No angina. No palpitations. No reduction in baseline activity tolerance. Patient is tolerating cardiac medications.     Problem List[1]  Past Medical History[2]  Social History     Socioeconomic History   • Marital  status: Single     Spouse name: Not on file   • Number of children: Not on file   • Years of education: Not on file   • Highest education level: Not on file   Occupational History   • Not on file   Tobacco Use   • Smoking status: Never   • Smokeless tobacco: Never   Vaping Use   • Vaping status: Never Used   Substance and Sexual Activity   • Alcohol use: No   • Drug use: No   • Sexual activity: Never   Other Topics Concern   • Not on file   Social History Narrative    Living in assisted living facility      Social Drivers of Health     Financial Resource Strain: Low Risk  (2/25/2025)    Overall Financial Resource Strain (CARDIA)    • Difficulty of Paying Living Expenses: Not hard at all   Food Insecurity: No Food Insecurity (2/25/2025)    Nursing - Inadequate Food Risk Classification    • Worried About Running Out of Food in the Last Year: Never true    • Ran Out of Food in the Last Year: Never true    • Ran Out of Food in the Last Year: Not on file   Transportation Needs: No Transportation Needs (2/25/2025)    PRAPARE - Transportation    • Lack of Transportation (Medical): No    • Lack of Transportation (Non-Medical): No   Physical Activity: Inactive (2/25/2025)    Exercise Vital Sign    • Days of Exercise per Week: 0 days    • Minutes of Exercise per Session: 0 min   Stress: No Stress Concern Present (2/25/2025)    [employer] - Occupational Stress Questionnaire    • Feeling of Stress : Not at all   Social Connections: Unknown (2/25/2025)    Social Connection and Isolation Panel    • Frequency of Communication with Friends and Family: More than three times a week    • Frequency of Social Gatherings with Friends and Family: More than three times a week    • Attends Restoration Services: Never    • Active Member of Clubs or Organizations: No    • Attends Club or Organization Meetings: Never    • Marital Status: Not on file   Intimate Partner Violence: Not At Risk (2/25/2025)     Humiliation, Afraid, Rape, and Kick questionnaire    • Fear of Current or Ex-Partner: No    • Emotionally Abused: No    • Physically Abused: No    • Sexually Abused: No   Housing Stability: Low Risk  (2/25/2025)    Housing Stability Vital Sign    • Unable to Pay for Housing in the Last Year: No    • Number of Times Moved in the Last Year: 1    • Homeless in the Last Year: No      Family History[3]  Past Surgical History[4]  Current Medications[5]  No Known Allergies    Labs:  Lab Results   Component Value Date    K 3.7 03/23/2025     03/23/2025    CO2 28 03/23/2025    CO2 34 (H) 02/04/2022    BUN 12 03/23/2025    CREATININE 0.88 03/23/2025    GLUCOSE 109 02/04/2022    CALCIUM 8.9 03/23/2025     Lab Results   Component Value Date    WBC 6.77 03/23/2025    HGB 14.3 03/23/2025    HCT 44.4 03/23/2025    MCV 94 03/23/2025     03/23/2025     Lab Results   Component Value Date    TRIG 92 12/11/2024    HDL 51 12/11/2024     Imaging: No results found.    Review of Systems:  Review of Systems   Respiratory:  Negative for shortness of breath.    Cardiovascular:  Negative for chest pain, palpitations and leg swelling.   All other systems reviewed and are negative.    Physical Exam:  Vitals and nursing note reviewed.   Constitutional:       General: not in acute distress.     Appearance: well-developed.   HENT:      Head: Normocephalic and atraumatic.   Neck:     Supple, no JVD, no carotid bruit.  Cardiovascular:      Rate and Rhythm: Normal rate. Rhythm regular.     Heart sounds: No murmur. Normal S1S2, No gallops. No rubs.      No pedal edema.   Pulmonary:      Effort: Pulmonary effort is normal. No respiratory distress.      Breath sounds: Normal breath sounds.   Abdominal:      Palpations: Abdomen is soft, no distention.     Tenderness: There is no abdominal tenderness.   Neurological:      Mental Status: awake, alert, oriented x 3.           [1]  Patient Active Problem List  Diagnosis   • FH: colon cancer   •  Seizure (HCC)   • HLD (hyperlipidemia)   • Essential hypertension   • Mild cognitive impairment   • Tremor, essential   • Vitamin D deficiency   • Low serum vitamin B12   • History of stroke   • Family history of ovarian cancer   • Hypokalemia   • Numbness and tingling in left hand   • Acute pain of left shoulder   • Weight loss   • Cataract   • Osteoarthritis   • Fall   • Closed head injury   • Status post placement of implantable loop recorder   • Altered mental status   • Medication management   • PAC (premature atrial contraction)   • Prediabetes   • Cerebellar ataxia (HCC)   [2]  Past Medical History:  Diagnosis Date   • Diverticulosis    • Hypertension    • Morbid obesity (HCC)    • Osteoarthritis 2/7/2022   • Pancreatic cyst    • Seizures (HCC)     last seizure 2013   • Stroke (HCC)    • Type 2 diabetes mellitus without complication, without long-term current use of insulin (HCC) 12/10/2018   • Wears glasses    [3]  Family History  Problem Relation Name Age of Onset   • Ovarian cancer Mother          Left ovary    • Stroke Father     • Colon cancer Father     • Lung cancer Maternal Grandfather     • Prostate cancer Paternal Grandmother     • Lung cancer Brother     • Other Other          MSH6-related Powers syndrome    • Stroke Family     • Ovarian cancer Family     • Ovarian cancer Family     • Breast cancer Family  42   [4]  Past Surgical History:  Procedure Laterality Date   • APPENDECTOMY     • COLONOSCOPY      complete   • EYE SURGERY     • RI COLONOSCOPY FLX DX W/COLLJ SPEC WHEN PFRMD N/A 03/06/2018    Procedure: COLONOSCOPY with polypectomy;  Surgeon: Jaymie Kruger MD;  Location: [facility];  Service: General   [5]    Current Outpatient Medications:   •  apixaban (Eliquis) 5 mg, TAKE 1 TABLET (5 MG TOTAL) BY MOUTH 2 (TWO) TIMES A DAY, Disp: 180 tablet, Rfl: 1  •  aspirin 81 mg chewable tablet, CHEW 1 TABLET BY MOUTH EVERY DAY, Disp: 30 tablet, Rfl: 4  •  atorvastatin (LIPITOR) 80 mg tablet, Take 1  tablet (80 mg total) by mouth daily, Disp: 90 tablet, Rfl: 3  •  Blood Pressure Monitor MISC, by Does not apply route daily, Disp: 1 each, Rfl: 0  •  Blood Pressure Monitoring (BLOOD PRESSURE CUFF) MISC, by Does not apply route daily, Disp: 1 each, Rfl: 0  •  calcium carbonate (OS-KARIN) 600 MG tablet, Take 1 tablet (600 mg total) by mouth 2 (two) times a day with meals, Disp: 180 tablet, Rfl: 1  •  donepezil (ARICEPT) 10 mg tablet, TAKE ONE TABLET BY MOUTH DAILY, Disp: 90 tablet, Rfl: 1  •  levETIRAcetam (KEPPRA) 750 mg tablet, TAKE 1 TABLET (750 MG TOTAL) BY MOUTH IN THE MORNING AND 1 TABLET (750 MG TOTAL) IN THE EVENING., Disp: 60 tablet, Rfl: 10  •  lisinopril (ZESTRIL) 10 mg tablet, Take 1 tablet (10 mg total) by mouth in the morning, Disp: 30 tablet, Rfl: 3  •  metoprolol succinate (TOPROL-XL) 50 mg 24 hr tablet, Take 1 tablet (50 mg total) by mouth daily at bedtime, Disp: 90 tablet, Rfl: 3

## 2025-07-12 DIAGNOSIS — R56.9 SEIZURE (HCC): ICD-10-CM

## 2025-07-14 RX ORDER — LEVETIRACETAM 750 MG/1
750 TABLET ORAL 2 TIMES DAILY
Qty: 60 TABLET | Refills: 0 | Status: SHIPPED | OUTPATIENT
Start: 2025-07-14

## 2025-07-17 ENCOUNTER — OFFICE VISIT (OUTPATIENT)
Dept: FAMILY MEDICINE CLINIC | Facility: CLINIC | Age: 75
End: 2025-07-17

## 2025-07-17 VITALS
DIASTOLIC BLOOD PRESSURE: 74 MMHG | TEMPERATURE: 98.3 F | HEIGHT: 67 IN | HEART RATE: 64 BPM | WEIGHT: 179 LBS | BODY MASS INDEX: 28.09 KG/M2 | SYSTOLIC BLOOD PRESSURE: 127 MMHG | OXYGEN SATURATION: 97 %

## 2025-07-17 DIAGNOSIS — I10 ESSENTIAL HYPERTENSION: Primary | ICD-10-CM

## 2025-07-17 PROCEDURE — G2211 COMPLEX E/M VISIT ADD ON: HCPCS | Performed by: FAMILY MEDICINE

## 2025-07-17 PROCEDURE — 99213 OFFICE O/P EST LOW 20 MIN: CPT | Performed by: FAMILY MEDICINE

## 2025-07-17 NOTE — ASSESSMENT & PLAN NOTE
- BP goal <130/80  - In office today 151/88, recheck 127/74  - Patient compliant with Lisinopril 10mg daily. She recently saw Cardiology on 6/27/25 who started her on metoprolol 50mg daily as well for BP control  -BP log from facility 7/1-7/16 with averages of 128 systolic and 75 diastolic     Plan  - Continue Lisinopril & metoprolol regimen daily  - Bring BP logs to next visit   - Complete previously ordered labs  - F/U in one month for BP check and lab review   - Print out of lab orders handed to family at this time to provide to facility for patient to complete her lab work

## 2025-07-17 NOTE — PROGRESS NOTES
Name: Estee Damon      : 1950      MRN: [id number]  Encounter Provider: Domenica Matos MD  Encounter Date: 2025   Encounter department: [facility] [place]  :  Assessment & Plan  Essential hypertension  - BP goal <130/80  - In office today 151/88, recheck 127/74  - Patient compliant with Lisinopril 10mg daily. She recently saw Cardiology on 25 who started her on metoprolol 50mg daily as well for BP control  -BP log from facility - with averages of 128 systolic and 75 diastolic     Plan  - Continue Lisinopril & metoprolol regimen daily  - Bring BP logs to next visit   - Complete previously ordered labs  - F/U in one month for BP check and lab review   - Print out of lab orders handed to family at this time to provide to facility for patient to complete her lab work                    History of Present Illness   Patient is a 75yo Female presenting for Blood Pressure follow up. Patient currently resides at [facility]. Previously ordered labs not yet completed. She presents with her brother and sister in law with paperwork from care facility. Patient recently saw Cardiology end of 2025 who initiated patient on Metoprolol 50mg daily for tighter BP control as her goal is <130/80. Patient is also due for Pneumonia and Shingles vaccine. Per family, they request to hold off on vaccines at this time. They deny any new concerns for patient at this time.      Review of Systems   Constitutional:  Negative for chills and fever.   HENT:  Negative for ear pain and sore throat.    Eyes:  Negative for pain and visual disturbance.   Respiratory:  Negative for cough and shortness of breath.    Cardiovascular:  Negative for chest pain and palpitations.   Gastrointestinal:  Negative for abdominal pain and vomiting.   Genitourinary:  Negative for dysuria and hematuria.   Musculoskeletal:  Negative for arthralgias and back pain.   Skin:  Negative for color change and  "rash.   Neurological:  Negative for seizures and syncope.   All other systems reviewed and are negative.      Objective   /88 (BP Location: Right arm, Patient Position: Sitting, Cuff Size: Standard)   Pulse 68   Temp 98.3 °F (36.8 °C) (Temporal)   Ht 5' 7\" (1.702 m)   Wt 81.2 kg (179 lb)   SpO2 97%   BMI 28.04 kg/m²      Physical Exam  Constitutional:       General: She is not in acute distress.     Appearance: Normal appearance.   HENT:      Head: Normocephalic and atraumatic.      Right Ear: External ear normal.      Left Ear: External ear normal.      Nose: Nose normal.      Mouth/Throat:      Mouth: Mucous membranes are moist.      Pharynx: Oropharynx is clear.     Eyes:      Conjunctiva/sclera: Conjunctivae normal.       Cardiovascular:      Rate and Rhythm: Normal rate and regular rhythm.   Pulmonary:      Effort: Pulmonary effort is normal. No respiratory distress.      Breath sounds: Normal breath sounds. No wheezing.   Abdominal:      Palpations: Abdomen is soft.      Tenderness: There is no abdominal tenderness.     Skin:     General: Skin is warm and dry.     Neurological:      Mental Status: She is alert. Mental status is at baseline.     Psychiatric:         Mood and Affect: Mood normal.         Behavior: Behavior normal.             Domenica aMtos MD   PGY-3, Family Medicine  [facility]     "

## 2025-07-18 ENCOUNTER — TELEPHONE (OUTPATIENT)
Dept: FAMILY MEDICINE CLINIC | Facility: CLINIC | Age: 75
End: 2025-07-18

## 2025-07-18 NOTE — TELEPHONE ENCOUNTER
Form scanned to chart    Folder (To be completed by) -  Dr. Matos     Name of Form - Physicians Order for [facility]    Form to be scanned to chart

## 2025-07-24 LAB — HBA1C MFR BLD HPLC: 6 %

## 2025-07-25 ENCOUNTER — TELEPHONE (OUTPATIENT)
Dept: FAMILY MEDICINE CLINIC | Facility: CLINIC | Age: 75
End: 2025-07-25

## 2025-07-25 NOTE — TELEPHONE ENCOUNTER
"Patient's labs resulted from outside facility with a critically low glucose of 48. Per review of labs they were collected on 7/24/25 at 11:22AM. Immediately called patient's facility upon receipt of lab panel. Spoke with Nursing Supervisor Eunice Lauro who stated that patient has been asymptomatic and has been eating normally in the dining shepherd yesterday and today. She is unsure if current lab result was fasting. Asked what patient's current diet order is. Ms. Restrepo was unsure but she stated patient eats a varied diet and was seen eating as usual today. Ms. Restrepo emphasized patient was asymptomatic, eating normally today and denied noticing any nausea, vomiting, headache, dizziness, weakness different from baseline in patient. Emphasized ED precautions with nursing supervisor. Ms. Restrepo gave me contact info of staff in charge of diet orders, there was no answer when attempting to contact x2.     Per chart review, there are diet orders updated from 4/2025 that state Diet order is \"Heart Healthy, Diabetic Diet\".  There is also informational orders to \"Decrease carbs and sugar\" as well as to limit Cranberry juice to 8 oz daily. Patient was known to drink Cranberry Juice throughout the day previously thus the limitation.     Wrote clarification of diet order under communications tab. Diet order to be only \"Heart Healthy\" not also Diabetic diet as most recent Hgb A1C is 6.0% indicating \"Pre-Diabetes\" and patient is not on Diabetes medication at this time. Will keep order to decrease carbs and sugar as this is a general recommendation to be mindful of with diet given pre-diabetes status but patient does not need fully restricting diabetic diet. Reached out to clinical staff to fax to facility asap.     Called and updated patient's RADHAA Lizett Cruz who verbalized understanding.       Domenica Matos MD   PGY-3, Family Medicine  [facility]"

## 2025-07-25 NOTE — LETTER
"    To Whom This May Concern:    My name is Dr. Carlo MD I am Estee Damon's current PCP. I am contacting in regards to recent low glucose level of 48 from labs that resulted today. I spoke with Nursing Supervisor Eunice Restrepo who stated patient was asymptomatic and has been eating normally. However per chart review I noticed that there are diet orders updated from 4/2025 that state Diet order is \"Heart Healthy, Diabetic Diet\".  There is also informational orders to \"Decrease carbs and sugar\" as well as to limit Cranberry juice to 8 oz daily. Patient was known to drink Cranberry Juice throughout the day previously thus the limitation.      I would like patient to be only on a  \"Heart Healthy\" diet not also Diabetic diet as most recent Hgb A1C is 6.0% indicating \"Pre-Diabetes\" and patient is not on Diabetes medication at this time. Will keep order to decrease carbs and sugar as this is a general recommendation to be mindful of with diet given pre-diabetes status but patient does not need fully restricting diabetic diet.    Please change patient's diet order to only \"Heart Healthy\" at this time.    Please contact my office if you have any questions or concerns.    Thank you,    Dr. Domenica Matos MD  "

## 2025-07-28 ENCOUNTER — TELEPHONE (OUTPATIENT)
Dept: FAMILY MEDICINE CLINIC | Facility: CLINIC | Age: 75
End: 2025-07-28

## 2025-08-03 ENCOUNTER — HOSPITAL ENCOUNTER (OUTPATIENT)
Dept: MRI IMAGING | Facility: HOSPITAL | Age: 75
Discharge: HOME | End: 2025-08-03
Attending: PSYCHIATRY & NEUROLOGY
Payer: MEDICARE

## 2025-08-03 DIAGNOSIS — I63.9 EMBOLIC STROKE (HCC): ICD-10-CM

## 2025-08-03 DIAGNOSIS — F01.50 VASCULAR DEMENTIA (HCC): ICD-10-CM

## 2025-08-03 PROCEDURE — 70551 MRI BRAIN STEM W/O DYE: CPT

## 2025-08-18 DIAGNOSIS — E55.9 VITAMIN D DEFICIENCY: ICD-10-CM

## 2025-08-18 RX ORDER — PHENOL 1.4 %
600 AEROSOL, SPRAY (ML) MUCOUS MEMBRANE 2 TIMES DAILY WITH MEALS
Qty: 180 TABLET | Refills: 1 | Status: SHIPPED | OUTPATIENT
Start: 2025-08-18

## 2025-08-19 ENCOUNTER — HOSPITAL ENCOUNTER (OUTPATIENT)
Dept: RADIOLOGY | Age: 75
Discharge: HOME | End: 2025-08-19
Payer: MEDICARE

## 2025-08-19 VITALS — HEIGHT: 67 IN | WEIGHT: 195 LBS | BODY MASS INDEX: 30.61 KG/M2

## 2025-08-19 DIAGNOSIS — E55.9 VITAMIN D DEFICIENCY: ICD-10-CM

## 2025-08-19 PROCEDURE — 77080 DXA BONE DENSITY AXIAL: CPT

## 2025-08-21 ENCOUNTER — TELEPHONE (OUTPATIENT)
Dept: FAMILY MEDICINE CLINIC | Facility: CLINIC | Age: 75
End: 2025-08-21

## (undated) DEVICE — FORCEP ELECSURG RADIAL JAW4 2.2 X 240CM  HOT BX